# Patient Record
Sex: MALE | Race: WHITE | NOT HISPANIC OR LATINO | Employment: FULL TIME | URBAN - METROPOLITAN AREA
[De-identification: names, ages, dates, MRNs, and addresses within clinical notes are randomized per-mention and may not be internally consistent; named-entity substitution may affect disease eponyms.]

---

## 2021-03-10 ENCOUNTER — OFFICE VISIT (OUTPATIENT)
Dept: FAMILY MEDICINE | Facility: CLINIC | Age: 61
End: 2021-03-10
Payer: COMMERCIAL

## 2021-03-10 VITALS
OXYGEN SATURATION: 95 % | HEIGHT: 70 IN | WEIGHT: 178.56 LBS | HEART RATE: 74 BPM | SYSTOLIC BLOOD PRESSURE: 164 MMHG | BODY MASS INDEX: 25.56 KG/M2 | TEMPERATURE: 98 F | DIASTOLIC BLOOD PRESSURE: 90 MMHG

## 2021-03-10 DIAGNOSIS — I10 ESSENTIAL HYPERTENSION: ICD-10-CM

## 2021-03-10 DIAGNOSIS — F41.9 ANXIETY: ICD-10-CM

## 2021-03-10 DIAGNOSIS — Z12.5 PROSTATE CANCER SCREENING: ICD-10-CM

## 2021-03-10 DIAGNOSIS — Z00.00 ANNUAL PHYSICAL EXAM: Primary | ICD-10-CM

## 2021-03-10 DIAGNOSIS — R47.9 SPEECH DISTURBANCE, UNSPECIFIED TYPE: ICD-10-CM

## 2021-03-10 PROCEDURE — 3077F SYST BP >= 140 MM HG: CPT | Mod: CPTII,S$GLB,, | Performed by: INTERNAL MEDICINE

## 2021-03-10 PROCEDURE — 1126F AMNT PAIN NOTED NONE PRSNT: CPT | Mod: S$GLB,,, | Performed by: INTERNAL MEDICINE

## 2021-03-10 PROCEDURE — 3077F PR MOST RECENT SYSTOLIC BLOOD PRESSURE >= 140 MM HG: ICD-10-PCS | Mod: CPTII,S$GLB,, | Performed by: INTERNAL MEDICINE

## 2021-03-10 PROCEDURE — 99999 PR PBB SHADOW E&M-NEW PATIENT-LVL III: ICD-10-PCS | Mod: PBBFAC,,, | Performed by: INTERNAL MEDICINE

## 2021-03-10 PROCEDURE — 1126F PR PAIN SEVERITY QUANTIFIED, NO PAIN PRESENT: ICD-10-PCS | Mod: S$GLB,,, | Performed by: INTERNAL MEDICINE

## 2021-03-10 PROCEDURE — 3080F PR MOST RECENT DIASTOLIC BLOOD PRESSURE >= 90 MM HG: ICD-10-PCS | Mod: CPTII,S$GLB,, | Performed by: INTERNAL MEDICINE

## 2021-03-10 PROCEDURE — 3080F DIAST BP >= 90 MM HG: CPT | Mod: CPTII,S$GLB,, | Performed by: INTERNAL MEDICINE

## 2021-03-10 PROCEDURE — 3008F PR BODY MASS INDEX (BMI) DOCUMENTED: ICD-10-PCS | Mod: CPTII,S$GLB,, | Performed by: INTERNAL MEDICINE

## 2021-03-10 PROCEDURE — 3008F BODY MASS INDEX DOCD: CPT | Mod: CPTII,S$GLB,, | Performed by: INTERNAL MEDICINE

## 2021-03-10 PROCEDURE — 99204 OFFICE O/P NEW MOD 45 MIN: CPT | Mod: S$GLB,,, | Performed by: INTERNAL MEDICINE

## 2021-03-10 PROCEDURE — 99204 PR OFFICE/OUTPT VISIT, NEW, LEVL IV, 45-59 MIN: ICD-10-PCS | Mod: S$GLB,,, | Performed by: INTERNAL MEDICINE

## 2021-03-10 PROCEDURE — 99999 PR PBB SHADOW E&M-NEW PATIENT-LVL III: CPT | Mod: PBBFAC,,, | Performed by: INTERNAL MEDICINE

## 2021-03-10 RX ORDER — ESCITALOPRAM OXALATE 5 MG/1
5 TABLET ORAL DAILY
Qty: 30 TABLET | Refills: 11 | Status: SHIPPED | OUTPATIENT
Start: 2021-03-10 | End: 2021-03-24

## 2021-03-10 RX ORDER — LOSARTAN POTASSIUM 100 MG/1
100 TABLET ORAL DAILY
Qty: 90 TABLET | Refills: 3 | Status: SHIPPED | OUTPATIENT
Start: 2021-03-10 | End: 2021-03-24

## 2021-03-10 RX ORDER — LOSARTAN POTASSIUM 100 MG/1
100 TABLET ORAL DAILY
COMMUNITY
Start: 2021-01-31 | End: 2021-03-10 | Stop reason: SDUPTHER

## 2021-03-12 ENCOUNTER — TELEPHONE (OUTPATIENT)
Dept: FAMILY MEDICINE | Facility: CLINIC | Age: 61
End: 2021-03-12

## 2021-03-16 ENCOUNTER — TELEPHONE (OUTPATIENT)
Dept: FAMILY MEDICINE | Facility: CLINIC | Age: 61
End: 2021-03-16

## 2021-03-16 LAB
25(OH)D3 SERPL-MCNC: 15 NG/ML (ref 30–100)
ALBUMIN SERPL-MCNC: 4.5 G/DL (ref 3.6–5.1)
ALBUMIN/GLOB SERPL: 2.4 (CALC) (ref 1–2.5)
ALP SERPL-CCNC: 56 U/L (ref 35–144)
ALT SERPL-CCNC: 15 U/L (ref 9–46)
AST SERPL-CCNC: 14 U/L (ref 10–35)
BASOPHILS # BLD AUTO: 31 CELLS/UL (ref 0–200)
BASOPHILS NFR BLD AUTO: 0.6 %
BILIRUB SERPL-MCNC: 0.9 MG/DL (ref 0.2–1.2)
BUN SERPL-MCNC: 14 MG/DL (ref 7–25)
BUN/CREAT SERPL: ABNORMAL (CALC) (ref 6–22)
CALCIUM SERPL-MCNC: 9.1 MG/DL (ref 8.6–10.3)
CHLORIDE SERPL-SCNC: 105 MMOL/L (ref 98–110)
CHOLEST SERPL-MCNC: 152 MG/DL
CHOLEST/HDLC SERPL: 2.6 (CALC)
CO2 SERPL-SCNC: 27 MMOL/L (ref 20–32)
CREAT SERPL-MCNC: 0.93 MG/DL (ref 0.7–1.25)
EOSINOPHIL # BLD AUTO: 0 CELLS/UL (ref 15–500)
EOSINOPHIL NFR BLD AUTO: 0 %
ERYTHROCYTE [DISTWIDTH] IN BLOOD BY AUTOMATED COUNT: 12.2 % (ref 11–15)
GFRSERPLBLD MDRD-ARVRAT: 87 ML/MIN/1.73M2
GLOBULIN SER CALC-MCNC: 1.9 G/DL (CALC) (ref 1.9–3.7)
GLUCOSE SERPL-MCNC: 115 MG/DL (ref 65–99)
HCT VFR BLD AUTO: 45 % (ref 38.5–50)
HDLC SERPL-MCNC: 58 MG/DL
HGB BLD-MCNC: 15 G/DL (ref 13.2–17.1)
LDLC SERPL CALC-MCNC: 78 MG/DL (CALC)
LYMPHOCYTES # BLD AUTO: 1430 CELLS/UL (ref 850–3900)
LYMPHOCYTES NFR BLD AUTO: 27.5 %
MCH RBC QN AUTO: 30.9 PG (ref 27–33)
MCHC RBC AUTO-ENTMCNC: 33.3 G/DL (ref 32–36)
MCV RBC AUTO: 92.6 FL (ref 80–100)
MONOCYTES # BLD AUTO: 504 CELLS/UL (ref 200–950)
MONOCYTES NFR BLD AUTO: 9.7 %
NEUTROPHILS # BLD AUTO: 3234 CELLS/UL (ref 1500–7800)
NEUTROPHILS NFR BLD AUTO: 62.2 %
NONHDLC SERPL-MCNC: 94 MG/DL (CALC)
PLATELET # BLD AUTO: 185 THOUSAND/UL (ref 140–400)
PMV BLD REES-ECKER: 10.7 FL (ref 7.5–12.5)
POTASSIUM SERPL-SCNC: 3.9 MMOL/L (ref 3.5–5.3)
PROT SERPL-MCNC: 6.4 G/DL (ref 6.1–8.1)
PSA SERPL-MCNC: 0.5 NG/ML
RBC # BLD AUTO: 4.86 MILLION/UL (ref 4.2–5.8)
SODIUM SERPL-SCNC: 140 MMOL/L (ref 135–146)
TRIGL SERPL-MCNC: 76 MG/DL
TSH SERPL-ACNC: 1.9 MIU/L (ref 0.4–4.5)
WBC # BLD AUTO: 5.2 THOUSAND/UL (ref 3.8–10.8)

## 2021-03-24 ENCOUNTER — LAB VISIT (OUTPATIENT)
Dept: LAB | Facility: HOSPITAL | Age: 61
End: 2021-03-24
Attending: INTERNAL MEDICINE
Payer: COMMERCIAL

## 2021-03-24 ENCOUNTER — OFFICE VISIT (OUTPATIENT)
Dept: FAMILY MEDICINE | Facility: CLINIC | Age: 61
End: 2021-03-24
Payer: COMMERCIAL

## 2021-03-24 VITALS
HEART RATE: 59 BPM | HEIGHT: 70 IN | OXYGEN SATURATION: 98 % | DIASTOLIC BLOOD PRESSURE: 82 MMHG | WEIGHT: 176.56 LBS | BODY MASS INDEX: 25.28 KG/M2 | TEMPERATURE: 98 F | SYSTOLIC BLOOD PRESSURE: 140 MMHG

## 2021-03-24 DIAGNOSIS — F41.9 ANXIETY: Primary | ICD-10-CM

## 2021-03-24 DIAGNOSIS — I10 ESSENTIAL HYPERTENSION: ICD-10-CM

## 2021-03-24 DIAGNOSIS — R73.9 HYPERGLYCEMIA: ICD-10-CM

## 2021-03-24 LAB
ESTIMATED AVG GLUCOSE: 105 MG/DL (ref 68–131)
HBA1C MFR BLD: 5.3 % (ref 4–5.6)

## 2021-03-24 PROCEDURE — 3077F PR MOST RECENT SYSTOLIC BLOOD PRESSURE >= 140 MM HG: ICD-10-PCS | Mod: CPTII,S$GLB,, | Performed by: INTERNAL MEDICINE

## 2021-03-24 PROCEDURE — 36415 COLL VENOUS BLD VENIPUNCTURE: CPT | Performed by: INTERNAL MEDICINE

## 2021-03-24 PROCEDURE — 3079F DIAST BP 80-89 MM HG: CPT | Mod: CPTII,S$GLB,, | Performed by: INTERNAL MEDICINE

## 2021-03-24 PROCEDURE — 99999 PR PBB SHADOW E&M-EST. PATIENT-LVL III: CPT | Mod: PBBFAC,,, | Performed by: INTERNAL MEDICINE

## 2021-03-24 PROCEDURE — 99214 OFFICE O/P EST MOD 30 MIN: CPT | Mod: S$GLB,,, | Performed by: INTERNAL MEDICINE

## 2021-03-24 PROCEDURE — 3077F SYST BP >= 140 MM HG: CPT | Mod: CPTII,S$GLB,, | Performed by: INTERNAL MEDICINE

## 2021-03-24 PROCEDURE — 1126F AMNT PAIN NOTED NONE PRSNT: CPT | Mod: S$GLB,,, | Performed by: INTERNAL MEDICINE

## 2021-03-24 PROCEDURE — 99999 PR PBB SHADOW E&M-EST. PATIENT-LVL III: ICD-10-PCS | Mod: PBBFAC,,, | Performed by: INTERNAL MEDICINE

## 2021-03-24 PROCEDURE — 1126F PR PAIN SEVERITY QUANTIFIED, NO PAIN PRESENT: ICD-10-PCS | Mod: S$GLB,,, | Performed by: INTERNAL MEDICINE

## 2021-03-24 PROCEDURE — 83036 HEMOGLOBIN GLYCOSYLATED A1C: CPT | Performed by: INTERNAL MEDICINE

## 2021-03-24 PROCEDURE — 3008F PR BODY MASS INDEX (BMI) DOCUMENTED: ICD-10-PCS | Mod: CPTII,S$GLB,, | Performed by: INTERNAL MEDICINE

## 2021-03-24 PROCEDURE — 3008F BODY MASS INDEX DOCD: CPT | Mod: CPTII,S$GLB,, | Performed by: INTERNAL MEDICINE

## 2021-03-24 PROCEDURE — 99214 PR OFFICE/OUTPT VISIT, EST, LEVL IV, 30-39 MIN: ICD-10-PCS | Mod: S$GLB,,, | Performed by: INTERNAL MEDICINE

## 2021-03-24 PROCEDURE — 3079F PR MOST RECENT DIASTOLIC BLOOD PRESSURE 80-89 MM HG: ICD-10-PCS | Mod: CPTII,S$GLB,, | Performed by: INTERNAL MEDICINE

## 2021-03-24 RX ORDER — VIT C/E/ZN/COPPR/LUTEIN/ZEAXAN 250MG-90MG
1000 CAPSULE ORAL DAILY
Qty: 30 CAPSULE | Refills: 11 | Status: SHIPPED | OUTPATIENT
Start: 2021-03-24 | End: 2022-11-30

## 2021-03-24 RX ORDER — LOSARTAN POTASSIUM 100 MG/1
100 TABLET ORAL DAILY
Qty: 90 TABLET | Refills: 3 | Status: SHIPPED | OUTPATIENT
Start: 2021-03-24 | End: 2022-04-07

## 2021-03-24 RX ORDER — ESCITALOPRAM OXALATE 10 MG/1
10 TABLET ORAL DAILY
Qty: 30 TABLET | Refills: 11 | Status: SHIPPED | OUTPATIENT
Start: 2021-03-24 | End: 2021-05-05

## 2021-03-26 ENCOUNTER — TELEPHONE (OUTPATIENT)
Dept: FAMILY MEDICINE | Facility: CLINIC | Age: 61
End: 2021-03-26

## 2021-04-28 DIAGNOSIS — Z12.11 COLON CANCER SCREENING: ICD-10-CM

## 2021-05-05 ENCOUNTER — OFFICE VISIT (OUTPATIENT)
Dept: FAMILY MEDICINE | Facility: CLINIC | Age: 61
End: 2021-05-05
Payer: COMMERCIAL

## 2021-05-05 VITALS
SYSTOLIC BLOOD PRESSURE: 140 MMHG | OXYGEN SATURATION: 97 % | BODY MASS INDEX: 25.25 KG/M2 | HEIGHT: 70 IN | HEART RATE: 68 BPM | DIASTOLIC BLOOD PRESSURE: 88 MMHG | WEIGHT: 176.38 LBS | TEMPERATURE: 98 F

## 2021-05-05 DIAGNOSIS — I10 ESSENTIAL HYPERTENSION: ICD-10-CM

## 2021-05-05 DIAGNOSIS — F41.9 ANXIETY: ICD-10-CM

## 2021-05-05 DIAGNOSIS — R73.9 HYPERGLYCEMIA: ICD-10-CM

## 2021-05-05 DIAGNOSIS — R47.9 SPEECH DISTURBANCE, UNSPECIFIED TYPE: ICD-10-CM

## 2021-05-05 DIAGNOSIS — R13.12 OROPHARYNGEAL DYSPHAGIA: Primary | ICD-10-CM

## 2021-05-05 PROCEDURE — 3008F BODY MASS INDEX DOCD: CPT | Mod: CPTII,S$GLB,, | Performed by: INTERNAL MEDICINE

## 2021-05-05 PROCEDURE — 99214 OFFICE O/P EST MOD 30 MIN: CPT | Mod: S$GLB,,, | Performed by: INTERNAL MEDICINE

## 2021-05-05 PROCEDURE — 1126F AMNT PAIN NOTED NONE PRSNT: CPT | Mod: S$GLB,,, | Performed by: INTERNAL MEDICINE

## 2021-05-05 PROCEDURE — 99999 PR PBB SHADOW E&M-EST. PATIENT-LVL IV: CPT | Mod: PBBFAC,,, | Performed by: INTERNAL MEDICINE

## 2021-05-05 PROCEDURE — 99999 PR PBB SHADOW E&M-EST. PATIENT-LVL IV: ICD-10-PCS | Mod: PBBFAC,,, | Performed by: INTERNAL MEDICINE

## 2021-05-05 PROCEDURE — 1126F PR PAIN SEVERITY QUANTIFIED, NO PAIN PRESENT: ICD-10-PCS | Mod: S$GLB,,, | Performed by: INTERNAL MEDICINE

## 2021-05-05 PROCEDURE — 99214 PR OFFICE/OUTPT VISIT, EST, LEVL IV, 30-39 MIN: ICD-10-PCS | Mod: S$GLB,,, | Performed by: INTERNAL MEDICINE

## 2021-05-05 PROCEDURE — 3008F PR BODY MASS INDEX (BMI) DOCUMENTED: ICD-10-PCS | Mod: CPTII,S$GLB,, | Performed by: INTERNAL MEDICINE

## 2021-05-05 RX ORDER — ESCITALOPRAM OXALATE 10 MG/1
5 TABLET ORAL DAILY
Qty: 15 TABLET | Refills: 11 | Status: SHIPPED | OUTPATIENT
Start: 2021-05-05 | End: 2021-05-05

## 2021-05-05 RX ORDER — ESCITALOPRAM OXALATE 10 MG/1
5 TABLET ORAL DAILY
Qty: 15 TABLET | Refills: 11 | Status: SHIPPED | OUTPATIENT
Start: 2021-05-05 | End: 2021-06-24

## 2021-05-12 ENCOUNTER — HOSPITAL ENCOUNTER (OUTPATIENT)
Dept: RADIOLOGY | Facility: HOSPITAL | Age: 61
Discharge: HOME | End: 2021-05-12
Attending: INTERNAL MEDICINE
Payer: COMMERCIAL

## 2021-05-12 ENCOUNTER — LAB VISIT (OUTPATIENT)
Dept: LAB | Facility: HOSPITAL | Age: 61
End: 2021-05-12
Attending: INTERNAL MEDICINE
Payer: COMMERCIAL

## 2021-05-12 DIAGNOSIS — Z12.11 COLON CANCER SCREENING: ICD-10-CM

## 2021-05-12 DIAGNOSIS — R13.12 OROPHARYNGEAL DYSPHAGIA: ICD-10-CM

## 2021-05-12 PROCEDURE — A9698 NON-RAD CONTRAST MATERIALNOC: HCPCS | Performed by: INTERNAL MEDICINE

## 2021-05-12 PROCEDURE — 74220 X-RAY XM ESOPHAGUS 1CNTRST: CPT | Mod: TC

## 2021-05-12 PROCEDURE — 82274 ASSAY TEST FOR BLOOD FECAL: CPT | Performed by: INTERNAL MEDICINE

## 2021-05-12 PROCEDURE — 74220 FL ESOPHAGRAM COMPLETE: ICD-10-PCS | Mod: 26,,, | Performed by: RADIOLOGY

## 2021-05-12 PROCEDURE — 74220 X-RAY XM ESOPHAGUS 1CNTRST: CPT | Mod: 26,,, | Performed by: RADIOLOGY

## 2021-05-12 PROCEDURE — 25500020 PHARM REV CODE 255: Performed by: INTERNAL MEDICINE

## 2021-05-12 RX ADMIN — BARIUM SULFATE 355 ML: 0.6 SUSPENSION ORAL at 10:05

## 2021-05-26 LAB — HEMOCCULT STL QL IA: NEGATIVE

## 2021-05-29 ENCOUNTER — TELEPHONE (OUTPATIENT)
Dept: FAMILY MEDICINE | Facility: CLINIC | Age: 61
End: 2021-05-29

## 2021-05-29 DIAGNOSIS — R13.12 OROPHARYNGEAL DYSPHAGIA: Primary | ICD-10-CM

## 2021-06-23 ENCOUNTER — PATIENT OUTREACH (OUTPATIENT)
Dept: ADMINISTRATIVE | Facility: OTHER | Age: 61
End: 2021-06-23

## 2021-06-24 ENCOUNTER — OFFICE VISIT (OUTPATIENT)
Dept: NEUROLOGY | Facility: CLINIC | Age: 61
End: 2021-06-24
Payer: COMMERCIAL

## 2021-06-24 VITALS
DIASTOLIC BLOOD PRESSURE: 92 MMHG | HEART RATE: 67 BPM | HEIGHT: 70 IN | SYSTOLIC BLOOD PRESSURE: 152 MMHG | WEIGHT: 176.38 LBS | BODY MASS INDEX: 25.25 KG/M2

## 2021-06-24 DIAGNOSIS — R47.9 SPEECH DISTURBANCE, UNSPECIFIED TYPE: ICD-10-CM

## 2021-06-24 DIAGNOSIS — R27.8 WORSENED HANDWRITING: Primary | ICD-10-CM

## 2021-06-24 DIAGNOSIS — F41.9 ANXIETY: ICD-10-CM

## 2021-06-24 PROCEDURE — 99999 PR PBB SHADOW E&M-EST. PATIENT-LVL IV: ICD-10-PCS | Mod: PBBFAC,,, | Performed by: PSYCHIATRY & NEUROLOGY

## 2021-06-24 PROCEDURE — 1126F AMNT PAIN NOTED NONE PRSNT: CPT | Mod: S$GLB,,, | Performed by: PSYCHIATRY & NEUROLOGY

## 2021-06-24 PROCEDURE — 3008F BODY MASS INDEX DOCD: CPT | Mod: CPTII,S$GLB,, | Performed by: PSYCHIATRY & NEUROLOGY

## 2021-06-24 PROCEDURE — 99204 OFFICE O/P NEW MOD 45 MIN: CPT | Mod: S$GLB,,, | Performed by: PSYCHIATRY & NEUROLOGY

## 2021-06-24 PROCEDURE — 1126F PR PAIN SEVERITY QUANTIFIED, NO PAIN PRESENT: ICD-10-PCS | Mod: S$GLB,,, | Performed by: PSYCHIATRY & NEUROLOGY

## 2021-06-24 PROCEDURE — 3008F PR BODY MASS INDEX (BMI) DOCUMENTED: ICD-10-PCS | Mod: CPTII,S$GLB,, | Performed by: PSYCHIATRY & NEUROLOGY

## 2021-06-24 PROCEDURE — 99204 PR OFFICE/OUTPT VISIT, NEW, LEVL IV, 45-59 MIN: ICD-10-PCS | Mod: S$GLB,,, | Performed by: PSYCHIATRY & NEUROLOGY

## 2021-06-24 PROCEDURE — 99999 PR PBB SHADOW E&M-EST. PATIENT-LVL IV: CPT | Mod: PBBFAC,,, | Performed by: PSYCHIATRY & NEUROLOGY

## 2021-07-06 ENCOUNTER — TELEPHONE (OUTPATIENT)
Dept: FAMILY MEDICINE | Facility: CLINIC | Age: 61
End: 2021-07-06

## 2021-08-11 ENCOUNTER — OFFICE VISIT (OUTPATIENT)
Dept: PSYCHIATRY | Facility: CLINIC | Age: 61
End: 2021-08-11
Payer: COMMERCIAL

## 2021-08-11 ENCOUNTER — LAB VISIT (OUTPATIENT)
Dept: LAB | Facility: HOSPITAL | Age: 61
End: 2021-08-11
Payer: COMMERCIAL

## 2021-08-11 VITALS
HEART RATE: 79 BPM | SYSTOLIC BLOOD PRESSURE: 163 MMHG | BODY MASS INDEX: 25.18 KG/M2 | WEIGHT: 175.5 LBS | DIASTOLIC BLOOD PRESSURE: 86 MMHG

## 2021-08-11 DIAGNOSIS — G12.29 PSEUDOBULBAR PALSY: ICD-10-CM

## 2021-08-11 DIAGNOSIS — R41.82 ALTERED MENTAL STATUS, UNSPECIFIED ALTERED MENTAL STATUS TYPE: ICD-10-CM

## 2021-08-11 DIAGNOSIS — F41.9 ANXIETY: ICD-10-CM

## 2021-08-11 DIAGNOSIS — G12.29 PSEUDOBULBAR PALSY: Primary | ICD-10-CM

## 2021-08-11 DIAGNOSIS — R42 DIZZINESS AND GIDDINESS: ICD-10-CM

## 2021-08-11 LAB
FOLATE SERPL-MCNC: 7.7 NG/ML (ref 4–24)
T3 SERPL-MCNC: 136 NG/DL (ref 60–180)
T3FREE SERPL-MCNC: 3 PG/ML (ref 2.3–4.2)
T4 FREE SERPL-MCNC: 1.14 NG/DL (ref 0.71–1.51)
T4 SERPL-MCNC: 10.2 UG/DL (ref 4.5–11.5)
TSH SERPL DL<=0.005 MIU/L-ACNC: 0.91 UIU/ML (ref 0.4–4)
VIT B12 SERPL-MCNC: 503 PG/ML (ref 210–950)

## 2021-08-11 PROCEDURE — 99999 PR PBB SHADOW E&M-EST. PATIENT-LVL II: ICD-10-PCS | Mod: PBBFAC,,, | Performed by: STUDENT IN AN ORGANIZED HEALTH CARE EDUCATION/TRAINING PROGRAM

## 2021-08-11 PROCEDURE — 84481 FREE ASSAY (FT-3): CPT | Performed by: STUDENT IN AN ORGANIZED HEALTH CARE EDUCATION/TRAINING PROGRAM

## 2021-08-11 PROCEDURE — 36415 COLL VENOUS BLD VENIPUNCTURE: CPT | Performed by: STUDENT IN AN ORGANIZED HEALTH CARE EDUCATION/TRAINING PROGRAM

## 2021-08-11 PROCEDURE — 84480 ASSAY TRIIODOTHYRONINE (T3): CPT | Performed by: STUDENT IN AN ORGANIZED HEALTH CARE EDUCATION/TRAINING PROGRAM

## 2021-08-11 PROCEDURE — 99999 PR PBB SHADOW E&M-EST. PATIENT-LVL II: CPT | Mod: PBBFAC,,, | Performed by: STUDENT IN AN ORGANIZED HEALTH CARE EDUCATION/TRAINING PROGRAM

## 2021-08-11 PROCEDURE — 84436 ASSAY OF TOTAL THYROXINE: CPT | Performed by: STUDENT IN AN ORGANIZED HEALTH CARE EDUCATION/TRAINING PROGRAM

## 2021-08-11 PROCEDURE — 99204 OFFICE O/P NEW MOD 45 MIN: CPT | Mod: S$GLB,,, | Performed by: STUDENT IN AN ORGANIZED HEALTH CARE EDUCATION/TRAINING PROGRAM

## 2021-08-11 PROCEDURE — 84443 ASSAY THYROID STIM HORMONE: CPT | Performed by: STUDENT IN AN ORGANIZED HEALTH CARE EDUCATION/TRAINING PROGRAM

## 2021-08-11 PROCEDURE — 82607 VITAMIN B-12: CPT | Performed by: STUDENT IN AN ORGANIZED HEALTH CARE EDUCATION/TRAINING PROGRAM

## 2021-08-11 PROCEDURE — 84439 ASSAY OF FREE THYROXINE: CPT | Performed by: STUDENT IN AN ORGANIZED HEALTH CARE EDUCATION/TRAINING PROGRAM

## 2021-08-11 PROCEDURE — 99204 PR OFFICE/OUTPT VISIT, NEW, LEVL IV, 45-59 MIN: ICD-10-PCS | Mod: S$GLB,,, | Performed by: STUDENT IN AN ORGANIZED HEALTH CARE EDUCATION/TRAINING PROGRAM

## 2021-08-11 PROCEDURE — 82746 ASSAY OF FOLIC ACID SERUM: CPT | Performed by: STUDENT IN AN ORGANIZED HEALTH CARE EDUCATION/TRAINING PROGRAM

## 2021-08-16 PROBLEM — G12.29 PSEUDOBULBAR PALSY: Status: ACTIVE | Noted: 2021-08-16

## 2021-08-25 ENCOUNTER — HOSPITAL ENCOUNTER (OUTPATIENT)
Dept: RADIOLOGY | Facility: HOSPITAL | Age: 61
Discharge: HOME | End: 2021-08-25
Attending: STUDENT IN AN ORGANIZED HEALTH CARE EDUCATION/TRAINING PROGRAM
Payer: COMMERCIAL

## 2021-08-25 DIAGNOSIS — R42 DIZZINESS AND GIDDINESS: ICD-10-CM

## 2021-08-25 DIAGNOSIS — R41.82 ALTERED MENTAL STATUS, UNSPECIFIED ALTERED MENTAL STATUS TYPE: ICD-10-CM

## 2021-08-25 PROCEDURE — 70450 CT HEAD WITHOUT CONTRAST: ICD-10-PCS | Mod: 26,,, | Performed by: RADIOLOGY

## 2021-08-25 PROCEDURE — 70450 CT HEAD/BRAIN W/O DYE: CPT | Mod: TC

## 2021-08-25 PROCEDURE — 70450 CT HEAD/BRAIN W/O DYE: CPT | Mod: 26,,, | Performed by: RADIOLOGY

## 2021-09-28 ENCOUNTER — TELEPHONE (OUTPATIENT)
Dept: PSYCHIATRY | Facility: CLINIC | Age: 61
End: 2021-09-28

## 2021-12-08 ENCOUNTER — OFFICE VISIT (OUTPATIENT)
Dept: FAMILY MEDICINE | Facility: CLINIC | Age: 61
End: 2021-12-08
Payer: COMMERCIAL

## 2021-12-08 VITALS
BODY MASS INDEX: 24.4 KG/M2 | DIASTOLIC BLOOD PRESSURE: 84 MMHG | OXYGEN SATURATION: 97 % | HEIGHT: 70 IN | HEART RATE: 63 BPM | WEIGHT: 170.44 LBS | SYSTOLIC BLOOD PRESSURE: 108 MMHG

## 2021-12-08 DIAGNOSIS — R68.89 DIFFICULTY WRITING: ICD-10-CM

## 2021-12-08 DIAGNOSIS — Z78.9 HEALTH MAINTENANCE ALTERATION: ICD-10-CM

## 2021-12-08 DIAGNOSIS — F41.9 ANXIETY: ICD-10-CM

## 2021-12-08 DIAGNOSIS — R47.81 SLURRED SPEECH: ICD-10-CM

## 2021-12-08 DIAGNOSIS — R26.81 GAIT INSTABILITY: ICD-10-CM

## 2021-12-08 DIAGNOSIS — I10 ESSENTIAL HYPERTENSION: Primary | ICD-10-CM

## 2021-12-08 DIAGNOSIS — R13.12 OROPHARYNGEAL DYSPHAGIA: ICD-10-CM

## 2021-12-08 PROCEDURE — 1159F MED LIST DOCD IN RCRD: CPT | Mod: CPTII,S$GLB,, | Performed by: INTERNAL MEDICINE

## 2021-12-08 PROCEDURE — 1160F PR REVIEW ALL MEDS BY PRESCRIBER/CLIN PHARMACIST DOCUMENTED: ICD-10-PCS | Mod: CPTII,S$GLB,, | Performed by: INTERNAL MEDICINE

## 2021-12-08 PROCEDURE — 99999 PR PBB SHADOW E&M-EST. PATIENT-LVL V: ICD-10-PCS | Mod: PBBFAC,,, | Performed by: INTERNAL MEDICINE

## 2021-12-08 PROCEDURE — 3079F PR MOST RECENT DIASTOLIC BLOOD PRESSURE 80-89 MM HG: ICD-10-PCS | Mod: CPTII,S$GLB,, | Performed by: INTERNAL MEDICINE

## 2021-12-08 PROCEDURE — 3008F BODY MASS INDEX DOCD: CPT | Mod: CPTII,S$GLB,, | Performed by: INTERNAL MEDICINE

## 2021-12-08 PROCEDURE — 3074F PR MOST RECENT SYSTOLIC BLOOD PRESSURE < 130 MM HG: ICD-10-PCS | Mod: CPTII,S$GLB,, | Performed by: INTERNAL MEDICINE

## 2021-12-08 PROCEDURE — 99214 PR OFFICE/OUTPT VISIT, EST, LEVL IV, 30-39 MIN: ICD-10-PCS | Mod: S$GLB,,, | Performed by: INTERNAL MEDICINE

## 2021-12-08 PROCEDURE — 99214 OFFICE O/P EST MOD 30 MIN: CPT | Mod: S$GLB,,, | Performed by: INTERNAL MEDICINE

## 2021-12-08 PROCEDURE — 1159F PR MEDICATION LIST DOCUMENTED IN MEDICAL RECORD: ICD-10-PCS | Mod: CPTII,S$GLB,, | Performed by: INTERNAL MEDICINE

## 2021-12-08 PROCEDURE — 3079F DIAST BP 80-89 MM HG: CPT | Mod: CPTII,S$GLB,, | Performed by: INTERNAL MEDICINE

## 2021-12-08 PROCEDURE — 4010F ACE/ARB THERAPY RXD/TAKEN: CPT | Mod: CPTII,S$GLB,, | Performed by: INTERNAL MEDICINE

## 2021-12-08 PROCEDURE — 3074F SYST BP LT 130 MM HG: CPT | Mod: CPTII,S$GLB,, | Performed by: INTERNAL MEDICINE

## 2021-12-08 PROCEDURE — 99999 PR PBB SHADOW E&M-EST. PATIENT-LVL V: CPT | Mod: PBBFAC,,, | Performed by: INTERNAL MEDICINE

## 2021-12-08 PROCEDURE — 3008F PR BODY MASS INDEX (BMI) DOCUMENTED: ICD-10-PCS | Mod: CPTII,S$GLB,, | Performed by: INTERNAL MEDICINE

## 2021-12-08 PROCEDURE — 1160F RVW MEDS BY RX/DR IN RCRD: CPT | Mod: CPTII,S$GLB,, | Performed by: INTERNAL MEDICINE

## 2021-12-08 PROCEDURE — 4010F PR ACE/ARB THEARPY RXD/TAKEN: ICD-10-PCS | Mod: CPTII,S$GLB,, | Performed by: INTERNAL MEDICINE
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
## 2021-12-13 ENCOUNTER — TELEPHONE (OUTPATIENT)
Dept: FAMILY MEDICINE | Facility: CLINIC | Age: 61
End: 2021-12-13
Payer: COMMERCIAL

## 2021-12-13 ENCOUNTER — HOSPITAL ENCOUNTER (OUTPATIENT)
Dept: RADIOLOGY | Facility: HOSPITAL | Age: 61
Discharge: HOME | End: 2021-12-13
Attending: INTERNAL MEDICINE
Payer: COMMERCIAL

## 2021-12-13 DIAGNOSIS — R47.81 SLURRED SPEECH: ICD-10-CM

## 2021-12-13 DIAGNOSIS — R13.12 OROPHARYNGEAL DYSPHAGIA: ICD-10-CM

## 2021-12-13 DIAGNOSIS — R68.89 DIFFICULTY WRITING: ICD-10-CM

## 2021-12-13 DIAGNOSIS — R26.81 GAIT INSTABILITY: ICD-10-CM

## 2021-12-13 PROCEDURE — 70551 MRI BRAIN STEM W/O DYE: CPT | Mod: TC,PO

## 2021-12-20 PROBLEM — Z78.9 IMPAIRED INSTRUMENTAL ACTIVITIES OF DAILY LIVING: Status: ACTIVE | Noted: 2021-12-20

## 2021-12-29 ENCOUNTER — OFFICE VISIT (OUTPATIENT)
Dept: PSYCHIATRY | Facility: CLINIC | Age: 61
End: 2021-12-29
Payer: COMMERCIAL

## 2021-12-29 VITALS
BODY MASS INDEX: 24.66 KG/M2 | WEIGHT: 171.88 LBS | HEART RATE: 63 BPM | DIASTOLIC BLOOD PRESSURE: 85 MMHG | SYSTOLIC BLOOD PRESSURE: 167 MMHG

## 2021-12-29 DIAGNOSIS — Z78.9 IMPAIRED INSTRUMENTAL ACTIVITIES OF DAILY LIVING: ICD-10-CM

## 2021-12-29 DIAGNOSIS — R41.82 ALTERED MENTAL STATUS, UNSPECIFIED ALTERED MENTAL STATUS TYPE: ICD-10-CM

## 2021-12-29 DIAGNOSIS — F41.9 ANXIETY DISORDER, UNSPECIFIED TYPE: Primary | ICD-10-CM

## 2021-12-29 PROCEDURE — 99999 PR PBB SHADOW E&M-EST. PATIENT-LVL II: ICD-10-PCS | Mod: PBBFAC,,, | Performed by: STUDENT IN AN ORGANIZED HEALTH CARE EDUCATION/TRAINING PROGRAM

## 2021-12-29 PROCEDURE — 99213 PR OFFICE/OUTPT VISIT, EST, LEVL III, 20-29 MIN: ICD-10-PCS | Mod: S$GLB,,, | Performed by: STUDENT IN AN ORGANIZED HEALTH CARE EDUCATION/TRAINING PROGRAM

## 2021-12-29 PROCEDURE — 99999 PR PBB SHADOW E&M-EST. PATIENT-LVL II: CPT | Mod: PBBFAC,,, | Performed by: STUDENT IN AN ORGANIZED HEALTH CARE EDUCATION/TRAINING PROGRAM

## 2021-12-29 PROCEDURE — 99213 OFFICE O/P EST LOW 20 MIN: CPT | Mod: S$GLB,,, | Performed by: STUDENT IN AN ORGANIZED HEALTH CARE EDUCATION/TRAINING PROGRAM

## 2021-12-29 RX ORDER — SERTRALINE HYDROCHLORIDE 100 MG/1
100 TABLET, FILM COATED ORAL DAILY
Qty: 30 TABLET | Refills: 2 | Status: SHIPPED | OUTPATIENT
Start: 2021-12-29 | End: 2022-04-06 | Stop reason: SDUPTHER

## 2022-01-05 ENCOUNTER — TELEPHONE (OUTPATIENT)
Dept: FAMILY MEDICINE | Facility: CLINIC | Age: 62
End: 2022-01-05
Payer: COMMERCIAL

## 2022-01-05 DIAGNOSIS — R26.81 UNSTEADY GAIT: ICD-10-CM

## 2022-01-05 DIAGNOSIS — R47.81 SLURRED SPEECH: Primary | ICD-10-CM

## 2022-01-05 DIAGNOSIS — R13.12 OROPHARYNGEAL DYSPHAGIA: ICD-10-CM

## 2022-01-26 ENCOUNTER — CLINICAL SUPPORT (OUTPATIENT)
Dept: SPEECH THERAPY | Facility: HOSPITAL | Age: 62
End: 2022-01-26
Attending: INTERNAL MEDICINE
Payer: COMMERCIAL

## 2022-01-26 ENCOUNTER — HOSPITAL ENCOUNTER (OUTPATIENT)
Dept: RADIOLOGY | Facility: HOSPITAL | Age: 62
Discharge: HOME | End: 2022-01-26
Attending: INTERNAL MEDICINE
Payer: COMMERCIAL

## 2022-01-26 DIAGNOSIS — R47.81 SLURRED SPEECH: ICD-10-CM

## 2022-01-26 DIAGNOSIS — R13.12 OROPHARYNGEAL DYSPHAGIA: ICD-10-CM

## 2022-01-26 PROBLEM — R26.81 UNSTEADY GAIT: Status: ACTIVE | Noted: 2022-01-26

## 2022-01-26 PROCEDURE — A9698 NON-RAD CONTRAST MATERIALNOC: HCPCS | Performed by: INTERNAL MEDICINE

## 2022-01-26 PROCEDURE — 92611 MOTION FLUOROSCOPY/SWALLOW: CPT

## 2022-01-26 PROCEDURE — 74230 X-RAY XM SWLNG FUNCJ C+: CPT | Mod: 26,,, | Performed by: RADIOLOGY

## 2022-01-26 PROCEDURE — 25500020 PHARM REV CODE 255: Performed by: INTERNAL MEDICINE

## 2022-01-26 PROCEDURE — 74230 X-RAY XM SWLNG FUNCJ C+: CPT | Mod: TC

## 2022-01-26 PROCEDURE — 74230 FL MODIFIED BARIUM SWALLOW SPEECH STUDY: ICD-10-PCS | Mod: 26,,, | Performed by: RADIOLOGY

## 2022-01-26 RX ADMIN — BARIUM SULFATE 70 ML: 0.81 POWDER, FOR SUSPENSION ORAL at 10:01
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
## 2022-01-31 PROBLEM — Z78.9 IMPAIRED INSTRUMENTAL ACTIVITIES OF DAILY LIVING: Status: RESOLVED | Noted: 2021-12-20 | Resolved: 2022-01-31

## 2022-02-02 ENCOUNTER — PATIENT MESSAGE (OUTPATIENT)
Dept: FAMILY MEDICINE | Facility: CLINIC | Age: 62
End: 2022-02-02
Payer: COMMERCIAL

## 2022-02-18 ENCOUNTER — PATIENT MESSAGE (OUTPATIENT)
Dept: FAMILY MEDICINE | Facility: CLINIC | Age: 62
End: 2022-02-18
Payer: COMMERCIAL

## 2022-03-22 ENCOUNTER — PATIENT OUTREACH (OUTPATIENT)
Dept: ADMINISTRATIVE | Facility: OTHER | Age: 62
End: 2022-03-22
Payer: COMMERCIAL

## 2022-03-23 NOTE — PROGRESS NOTES
Health Maintenance Due   Topic Date Due    Hepatitis C Screening  Never done    HIV Screening  Never done    TETANUS VACCINE  Never done    Shingles Vaccine (1 of 2) Never done    Influenza Vaccine (1) Never done    COVID-19 Vaccine (3 - Booster for Moderna series) 12/28/2021     Updates were requested from care everywhere.  Chart was reviewed for overdue Proactive [facility] Encounters ([facility]) topics (CRS, Breast Cancer Screening, Eye exam)  Health Maintenance has been updated.  LINKS immunization registry triggered.  Immunizations were reconciled.

## 2022-03-24 ENCOUNTER — OFFICE VISIT (OUTPATIENT)
Dept: NEUROLOGY | Facility: CLINIC | Age: 62
End: 2022-03-24
Payer: COMMERCIAL

## 2022-03-24 VITALS
HEART RATE: 74 BPM | BODY MASS INDEX: 24.48 KG/M2 | HEIGHT: 70 IN | DIASTOLIC BLOOD PRESSURE: 87 MMHG | WEIGHT: 171 LBS | SYSTOLIC BLOOD PRESSURE: 150 MMHG

## 2022-03-24 DIAGNOSIS — R29.898 RIGIDITY: ICD-10-CM

## 2022-03-24 DIAGNOSIS — R26.81 GAIT INSTABILITY: ICD-10-CM

## 2022-03-24 DIAGNOSIS — R47.89 RAPID RATE OF SPEECH: Primary | ICD-10-CM

## 2022-03-24 DIAGNOSIS — R13.12 OROPHARYNGEAL DYSPHAGIA: ICD-10-CM

## 2022-03-24 PROCEDURE — 99999 PR PBB SHADOW E&M-EST. PATIENT-LVL IV: CPT | Mod: PBBFAC,,, | Performed by: PSYCHIATRY & NEUROLOGY

## 2022-03-24 PROCEDURE — 3079F DIAST BP 80-89 MM HG: CPT | Mod: CPTII,S$GLB,, | Performed by: PSYCHIATRY & NEUROLOGY

## 2022-03-24 PROCEDURE — 4010F PR ACE/ARB THEARPY RXD/TAKEN: ICD-10-PCS | Mod: CPTII,S$GLB,, | Performed by: PSYCHIATRY & NEUROLOGY

## 2022-03-24 PROCEDURE — 99214 PR OFFICE/OUTPT VISIT, EST, LEVL IV, 30-39 MIN: ICD-10-PCS | Mod: S$GLB,,, | Performed by: PSYCHIATRY & NEUROLOGY

## 2022-03-24 PROCEDURE — 3077F PR MOST RECENT SYSTOLIC BLOOD PRESSURE >= 140 MM HG: ICD-10-PCS | Mod: CPTII,S$GLB,, | Performed by: PSYCHIATRY & NEUROLOGY

## 2022-03-24 PROCEDURE — 3008F PR BODY MASS INDEX (BMI) DOCUMENTED: ICD-10-PCS | Mod: CPTII,S$GLB,, | Performed by: PSYCHIATRY & NEUROLOGY

## 2022-03-24 PROCEDURE — 1160F RVW MEDS BY RX/DR IN RCRD: CPT | Mod: CPTII,S$GLB,, | Performed by: PSYCHIATRY & NEUROLOGY

## 2022-03-24 PROCEDURE — 3079F PR MOST RECENT DIASTOLIC BLOOD PRESSURE 80-89 MM HG: ICD-10-PCS | Mod: CPTII,S$GLB,, | Performed by: PSYCHIATRY & NEUROLOGY

## 2022-03-24 PROCEDURE — 1159F MED LIST DOCD IN RCRD: CPT | Mod: CPTII,S$GLB,, | Performed by: PSYCHIATRY & NEUROLOGY

## 2022-03-24 PROCEDURE — 3077F SYST BP >= 140 MM HG: CPT | Mod: CPTII,S$GLB,, | Performed by: PSYCHIATRY & NEUROLOGY

## 2022-03-24 PROCEDURE — 1159F PR MEDICATION LIST DOCUMENTED IN MEDICAL RECORD: ICD-10-PCS | Mod: CPTII,S$GLB,, | Performed by: PSYCHIATRY & NEUROLOGY

## 2022-03-24 PROCEDURE — 4010F ACE/ARB THERAPY RXD/TAKEN: CPT | Mod: CPTII,S$GLB,, | Performed by: PSYCHIATRY & NEUROLOGY

## 2022-03-24 PROCEDURE — 3008F BODY MASS INDEX DOCD: CPT | Mod: CPTII,S$GLB,, | Performed by: PSYCHIATRY & NEUROLOGY

## 2022-03-24 PROCEDURE — 1160F PR REVIEW ALL MEDS BY PRESCRIBER/CLIN PHARMACIST DOCUMENTED: ICD-10-PCS | Mod: CPTII,S$GLB,, | Performed by: PSYCHIATRY & NEUROLOGY

## 2022-03-24 PROCEDURE — 99214 OFFICE O/P EST MOD 30 MIN: CPT | Mod: S$GLB,,, | Performed by: PSYCHIATRY & NEUROLOGY

## 2022-03-24 PROCEDURE — 99999 PR PBB SHADOW E&M-EST. PATIENT-LVL IV: ICD-10-PCS | Mod: PBBFAC,,, | Performed by: PSYCHIATRY & NEUROLOGY
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
## 2022-03-25 ENCOUNTER — TELEPHONE (OUTPATIENT)
Dept: NEUROLOGY | Facility: CLINIC | Age: 62
End: 2022-03-25
Payer: COMMERCIAL

## 2022-03-25 NOTE — TELEPHONE ENCOUNTER
----- Message from Amirah Smith PA-C sent at 3/25/2022  2:56 PM CDT -----  I can see him.   ----- Message -----  From: Morelia Price MA  Sent: 3/25/2022   2:48 PM CDT  To: Amirah Smith PA-C      ----- Message -----  From: Polo Slater MA  Sent: 3/25/2022   8:16 AM CDT  To: JADYN Springer Dr has the information In the referral     Thanks  ----- Message -----  From: Morelia Price MA  Sent: 3/24/2022   4:44 PM CDT  To: Polo Slater MA    What is the patient being seen for?  ----- Message -----  From: Polo Slater MA  Sent: 3/24/2022  12:26 PM CDT  To: Luis Adnino , can you schedule patient appointment ? Wednesday afternoon  is the best time for pt because he's off from work.    Thanks in advance

## 2022-03-30 ENCOUNTER — OFFICE VISIT (OUTPATIENT)
Dept: FAMILY MEDICINE | Facility: CLINIC | Age: 62
End: 2022-03-30
Payer: COMMERCIAL

## 2022-03-30 VITALS
SYSTOLIC BLOOD PRESSURE: 146 MMHG | OXYGEN SATURATION: 96 % | DIASTOLIC BLOOD PRESSURE: 82 MMHG | BODY MASS INDEX: 23.83 KG/M2 | HEIGHT: 70 IN | HEART RATE: 71 BPM | WEIGHT: 166.44 LBS

## 2022-03-30 DIAGNOSIS — F41.9 ANXIETY: ICD-10-CM

## 2022-03-30 DIAGNOSIS — Z12.5 PROSTATE CANCER SCREENING: ICD-10-CM

## 2022-03-30 DIAGNOSIS — I10 ESSENTIAL HYPERTENSION: Primary | ICD-10-CM

## 2022-03-30 DIAGNOSIS — R47.9 SPEECH DISTURBANCE, UNSPECIFIED TYPE: ICD-10-CM

## 2022-03-30 DIAGNOSIS — R26.81 GAIT INSTABILITY: ICD-10-CM

## 2022-03-30 PROCEDURE — 3077F PR MOST RECENT SYSTOLIC BLOOD PRESSURE >= 140 MM HG: ICD-10-PCS | Mod: CPTII,S$GLB,, | Performed by: INTERNAL MEDICINE

## 2022-03-30 PROCEDURE — 3077F SYST BP >= 140 MM HG: CPT | Mod: CPTII,S$GLB,, | Performed by: INTERNAL MEDICINE

## 2022-03-30 PROCEDURE — 99999 PR PBB SHADOW E&M-EST. PATIENT-LVL IV: CPT | Mod: PBBFAC,,, | Performed by: INTERNAL MEDICINE

## 2022-03-30 PROCEDURE — 1160F RVW MEDS BY RX/DR IN RCRD: CPT | Mod: CPTII,S$GLB,, | Performed by: INTERNAL MEDICINE

## 2022-03-30 PROCEDURE — 3008F PR BODY MASS INDEX (BMI) DOCUMENTED: ICD-10-PCS | Mod: CPTII,S$GLB,, | Performed by: INTERNAL MEDICINE

## 2022-03-30 PROCEDURE — 3079F DIAST BP 80-89 MM HG: CPT | Mod: CPTII,S$GLB,, | Performed by: INTERNAL MEDICINE

## 2022-03-30 PROCEDURE — 99214 PR OFFICE/OUTPT VISIT, EST, LEVL IV, 30-39 MIN: ICD-10-PCS | Mod: S$GLB,,, | Performed by: INTERNAL MEDICINE

## 2022-03-30 PROCEDURE — 1160F PR REVIEW ALL MEDS BY PRESCRIBER/CLIN PHARMACIST DOCUMENTED: ICD-10-PCS | Mod: CPTII,S$GLB,, | Performed by: INTERNAL MEDICINE

## 2022-03-30 PROCEDURE — 1159F MED LIST DOCD IN RCRD: CPT | Mod: CPTII,S$GLB,, | Performed by: INTERNAL MEDICINE

## 2022-03-30 PROCEDURE — 3079F PR MOST RECENT DIASTOLIC BLOOD PRESSURE 80-89 MM HG: ICD-10-PCS | Mod: CPTII,S$GLB,, | Performed by: INTERNAL MEDICINE

## 2022-03-30 PROCEDURE — 4010F PR ACE/ARB THEARPY RXD/TAKEN: ICD-10-PCS | Mod: CPTII,S$GLB,, | Performed by: INTERNAL MEDICINE

## 2022-03-30 PROCEDURE — 3008F BODY MASS INDEX DOCD: CPT | Mod: CPTII,S$GLB,, | Performed by: INTERNAL MEDICINE

## 2022-03-30 PROCEDURE — 99214 OFFICE O/P EST MOD 30 MIN: CPT | Mod: S$GLB,,, | Performed by: INTERNAL MEDICINE

## 2022-03-30 PROCEDURE — 99999 PR PBB SHADOW E&M-EST. PATIENT-LVL IV: ICD-10-PCS | Mod: PBBFAC,,, | Performed by: INTERNAL MEDICINE

## 2022-03-30 PROCEDURE — 4010F ACE/ARB THERAPY RXD/TAKEN: CPT | Mod: CPTII,S$GLB,, | Performed by: INTERNAL MEDICINE

## 2022-03-30 PROCEDURE — 1159F PR MEDICATION LIST DOCUMENTED IN MEDICAL RECORD: ICD-10-PCS | Mod: CPTII,S$GLB,, | Performed by: INTERNAL MEDICINE

## 2022-03-30 NOTE — PROGRESS NOTES
Subjective:       Patient ID: Claude E. Escude is a 62 y.o. male.    Chief Complaint: Hypertension (3 month )      HPI  Claude E. Escude is a 62 y.o. male with chronic conditions of HTN who presents today for follow-up.    Plan has been presenting changes in handwriting, pressures speech, mild trouble swallowing, has been noticing chuffling and occasionally tripping with his left foot falling a few days ago hurting his elbow and back, now better.  Has been receiving physical therapy reporting improvement in gait.  Upper extremity usually has no significant issues other than his fast writing and change in hand writing. Has needed to as other people at work to right for him. Occupational therapy did not show significant benefits and his writing. Not as concerned with the swallowing.     His saw psychiatrist and started Zoloft but he does not think has helped much.  His to neurologist a few days ago and was referred to specialist in movement Disorder has his clinical picture is unclear.    Speech therapy was never arranged.    Health Maintenance:  Health Maintenance   Topic Date Due    Hepatitis C Screening  Never done    TETANUS VACCINE  Never done    Lipid Panel  03/15/2026       Review of Systems   Constitutional: Negative.    HENT: Negative for trouble swallowing.    Eyes: Negative for visual disturbance.   Respiratory: Negative.    Cardiovascular: Negative.    Gastrointestinal: Negative.    Genitourinary: Negative.    Musculoskeletal: Positive for arthralgias (Elbow pain) and back pain.   Neurological: Negative for headaches.        See above   Psychiatric/Behavioral: Negative for sleep disturbance. The patient is nervous/anxious.       Past Medical History:   Diagnosis Date    HTN (hypertension)        No past surgical history on file.    Family History   Problem Relation Age of Onset    Heart disease Mother     Hypertension Mother     Diabetes Mellitus Mother     Arthritis Mother     Prostate cancer  Father 48    No Known Problems Sister     Bone cancer Brother 43    Heart attack Brother 46        smoked    Heart disease Maternal Grandmother     Heart disease Maternal Grandfather     Heart disease Paternal Grandmother     Heart disease Paternal Grandfather        Social History     Socioeconomic History    Marital status:    Tobacco Use    Smoking status: Never Smoker    Smokeless tobacco: Never Used       Current Outpatient Medications   Medication Sig Dispense Refill    losartan (COZAAR) 100 MG tablet Take 1 tablet (100 mg total) by mouth once daily. 90 tablet 3    sertraline (ZOLOFT) 100 MG tablet Take 1 tablet (100 mg total) by mouth once daily. 30 tablet 2    cholecalciferol, vitamin D3, (VITAMIN D3) 25 mcg (1,000 unit) capsule Take 1 capsule (1,000 Units total) by mouth once daily. (Patient not taking: No sig reported) 30 capsule 11     No current facility-administered medications for this visit.       Review of patient's allergies indicates:  No Known Allergies      Objective:       Last 3 sets of Vitals    Vitals - 1 value per visit 3/24/2022 3/30/2022 3/30/2022   SYSTOLIC 150 - 146   DIASTOLIC 87 - 82   Pulse 74 - 71   Temp - - -   SPO2 - - 96   Weight (lb) 171 - 166.45   Weight (kg) 77.565 - 75.5   Height 70 - 70   BMI (Calculated) 24.5 - 23.9   VISIT REPORT - - -   Pain Score  - 0 -   Physical Exam  Constitutional:       General: He is not in acute distress.     Appearance: Normal appearance.   HENT:      Head: Normocephalic.   Eyes:      General: No scleral icterus.     Extraocular Movements: Extraocular movements intact.      Conjunctiva/sclera: Conjunctivae normal.   Neck:      Vascular: No carotid bruit.      Comments: No goiter.  Cardiovascular:      Rate and Rhythm: Normal rate and regular rhythm.      Pulses: Normal pulses.      Heart sounds: Normal heart sounds.   Pulmonary:      Effort: Pulmonary effort is normal.      Breath sounds: Normal breath sounds.   Abdominal:       General: Bowel sounds are normal. There is no distension.      Palpations: Abdomen is soft.   Musculoskeletal:         General: No swelling. Normal range of motion.   Lymphadenopathy:      Cervical: No cervical adenopathy.   Skin:     General: Skin is warm and dry.   Neurological:      General: No focal deficit present.      Mental Status: He is alert and oriented to person, place, and time.      Motor: No weakness.      Gait: Gait normal.   Psychiatric:         Mood and Affect: Mood normal.         Behavior: Behavior normal.               CBC:  Recent Labs   Lab 03/15/21  0713   WBC 5.2   RBC 4.86   Hemoglobin 15.0   Hematocrit 45.0   Platelets 185   MCV 92.6   MCH 30.9   MCHC 33.3     CMP:  Recent Labs   Lab 03/15/21  0713   Glucose 115 H   Calcium 9.1   Albumin 4.5   Total Protein 6.4   Sodium 140   Potassium 3.9   CO2 27   Chloride 105   BUN 14   Creatinine 0.93   ALT 15   AST 14   Total Bilirubin 0.9     URINALYSIS:       LIPIDS:  Recent Labs   Lab 03/15/21  0713 08/11/21  1428   TSH 1.90 0.912   HDL 58  --    Cholesterol 152  --    Triglycerides 76  --    LDL Cholesterol 78  --    HDL/Cholesterol Ratio 2.6  --    Non HDL Chol. (LDL+VLDL) 94  --      TSH:  Recent Labs   Lab 03/15/21  0713 08/11/21  1428   TSH 1.90 0.912       A1C:  Recent Labs   Lab 03/24/21  1523   Hemoglobin A1C 5.3       Imaging:  Fl Modified Barium Swallow Speech  Narrative: EXAMINATION:  FL MODIFIED BARIUM SWALLOW SPEECH STUDY    CLINICAL HISTORY:  Dysphagia, oropharyngeal phase    TECHNIQUE:  Conjunction with speech pathology, the patient ingested barium of varying consistencies to include thin, pudding, barium coated peaches, and cracker.    Fluoroscopy time: 2.8 minutes.    COMPARISON:  None.    FINDINGS:  Mild weakened pharyngeal constriction across tested consistencies with mild pharyngeal residue.  Two instances of purposeful oropharyngeal regurgitation and re-swallow with barium coated cracker.  No convincing laryngeal penetration  or tracheal aspiration.  Some sticking of the barium tablet within the oral cavity on initial swallow attempt.  The pill was swallowed without difficulty on subsequent swallow attempt.  Impression: As above.    Please see separate speech pathology report further detail.    Electronically signed by: Julien Ackerman  Date:    01/26/2022  Time:    11:40      Assessment:       1. Essential hypertension    2. Speech disturbance, unspecified type    3. Gait instability    4. Anxiety    5. Prostate cancer screening          Chronic conditions status updated as per HPI. Other than changes above, cont current medications and maintain follow up with specialist. Return to clinic in 3 months.  Plan:       Claude was seen today for hypertension.    Diagnoses and all orders for this visit:    Essential hypertension  -     Hypertension Digital Medicine ([facility]) Enrollment Order  -     Hypertension Digital Medicine ([facility]): Assign Onboarding Questionnaires  -     CBC Auto Differential; Future  -     Comprehensive Metabolic Panel; Future  -     Hemoglobin A1C; Future  -     Lipid Panel; Future  -     TSH; Future  -     Urinalysis; Future    Speech disturbance, unspecified type  -     Ambulatory referral/consult to Speech Therapy; Future  - follow neurology evaluation  - swallow evaluation with minor changes and patient reports not really having trouble at this time.    Gait instability   - continue physical therapy and follow neurology evaluation.   - other complains like hand writing appears stable.    Anxiety   - does not feel Zoloft made a difference.  Stress related factors possibly associated to complains.   - follow-up with Psychiatry.    Prostate cancer screening  -     PSA, Screening; Future      Health Maintenance Due   Topic Date Due    Hepatitis C Screening  Never done    HIV Screening  Never done    TETANUS VACCINE  Never done    Shingles Vaccine (1 of 2) Never done    Influenza Vaccine (1) Never done    COVID-19  Vaccine (3 - Booster for Moderna series) 12/28/2021        Evangelina Stone MD  [facility]  Disclaimer:  This note has been generated using voice-recognition software. There may be grammatical or spelling errors that have been missed during proof-reading

## 2022-04-01 NOTE — TELEPHONE ENCOUNTER
Care Due:                  Date            Visit Type   Department     Provider  --------------------------------------------------------------------------------                                EP -                              [facility]  Last Visit: 03-      CARE ([facility])   MEDICINE       Evangelina Stone                               -                              [facility]  Next Visit: 05-      CARE ([facility])   MEDICINE       Evangelina Stone                                                            Last  Test          Frequency    Reason                     Performed    Due Date  --------------------------------------------------------------------------------    CMP.........  12 months..  losartan.................  03-   03-    Powered by [employer]. Reference number: [id number].   4/01/2022 2:39:03 PM CDT

## 2022-04-06 ENCOUNTER — TELEPHONE (OUTPATIENT)
Dept: NEUROLOGY | Facility: CLINIC | Age: 62
End: 2022-04-06
Payer: COMMERCIAL

## 2022-04-06 NOTE — TELEPHONE ENCOUNTER
----- Message from Susy Vincent sent at 4/6/2022  2:29 PM CDT -----  Contact: [phone]  Type:  RX Refill Request    Who Called: pt called  Refill or New Rx:refill  RX Name and Strength:sertraline (ZOLOFT) 100 MG tablet,losartan (COZAAR) 100 MG tablet  How is the patient currently taking it? (ex. 1XDay):  Is this a 30 day or 90 day RX:30 days  Preferred Pharmacy with phone number:[facility]/pharmacy #[id number] - [place], [place] - [address]  Local or Mail Order:local  Ordering Provider:Dr. gandara  Would the patient rather a call back or a response via [facility]? Call back  Best Call Back Number:[phone]  Additional Information:

## 2022-04-06 NOTE — TELEPHONE ENCOUNTER
No new care gaps identified.  Powered by [employer]. Reference number: [id number].   4/06/2022 2:41:32 PM CDT

## 2022-04-06 NOTE — TELEPHONE ENCOUNTER
Refill Routing Note   Medication(s) are not appropriate for processing by [facility] for the following reason(s):      - Required laboratory values are outdated  - Required vitals are abnormal    [facility] action(s):  Defer Medication-related problems identified: Requires labs      Labs scheduled  Medication reconciliation completed: No     Appointments  past 12m or future 3m with PCP    Date Provider   Last Visit   3/30/2022 Evangelina Stone MD   Next Visit   5/25/2022 Evangelina Stone MD   ED visits in past 90 days: 0        Note composed:10:50 PM 04/05/2022

## 2022-04-07 RX ORDER — SERTRALINE HYDROCHLORIDE 100 MG/1
100 TABLET, FILM COATED ORAL DAILY
Qty: 30 TABLET | Refills: 2 | Status: SHIPPED | OUTPATIENT
Start: 2022-04-07 | End: 2022-07-04

## 2022-04-07 RX ORDER — LOSARTAN POTASSIUM 100 MG/1
100 TABLET ORAL DAILY
Qty: 90 TABLET | Refills: 3 | Status: SHIPPED | OUTPATIENT
Start: 2022-04-07 | End: 2023-06-22

## 2022-04-07 RX ORDER — LOSARTAN POTASSIUM 100 MG/1
TABLET ORAL
Qty: 90 TABLET | Refills: 1 | Status: SHIPPED | OUTPATIENT
Start: 2022-04-07 | End: 2022-04-07 | Stop reason: SDUPTHER

## 2022-05-02 ENCOUNTER — PATIENT MESSAGE (OUTPATIENT)
Dept: ADMINISTRATIVE | Facility: HOSPITAL | Age: 62
End: 2022-05-02
Payer: COMMERCIAL
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
## 2022-05-11 ENCOUNTER — OFFICE VISIT (OUTPATIENT)
Dept: NEUROLOGY | Facility: CLINIC | Age: 62
End: 2022-05-11
Payer: COMMERCIAL

## 2022-05-11 VITALS
WEIGHT: 167.69 LBS | SYSTOLIC BLOOD PRESSURE: 141 MMHG | HEART RATE: 80 BPM | DIASTOLIC BLOOD PRESSURE: 83 MMHG | BODY MASS INDEX: 24.06 KG/M2

## 2022-05-11 DIAGNOSIS — R47.89 RAPID RATE OF SPEECH: ICD-10-CM

## 2022-05-11 DIAGNOSIS — R13.12 OROPHARYNGEAL DYSPHAGIA: ICD-10-CM

## 2022-05-11 DIAGNOSIS — R29.898 RIGIDITY: ICD-10-CM

## 2022-05-11 DIAGNOSIS — G20.C PARKINSONISM, UNSPECIFIED PARKINSONISM TYPE: Primary | ICD-10-CM

## 2022-05-11 PROCEDURE — 3079F DIAST BP 80-89 MM HG: CPT | Mod: CPTII,S$GLB,, | Performed by: PHYSICIAN ASSISTANT

## 2022-05-11 PROCEDURE — 1160F RVW MEDS BY RX/DR IN RCRD: CPT | Mod: CPTII,S$GLB,, | Performed by: PHYSICIAN ASSISTANT

## 2022-05-11 PROCEDURE — 1159F PR MEDICATION LIST DOCUMENTED IN MEDICAL RECORD: ICD-10-PCS | Mod: CPTII,S$GLB,, | Performed by: PHYSICIAN ASSISTANT

## 2022-05-11 PROCEDURE — 3008F BODY MASS INDEX DOCD: CPT | Mod: CPTII,S$GLB,, | Performed by: PHYSICIAN ASSISTANT

## 2022-05-11 PROCEDURE — 3008F PR BODY MASS INDEX (BMI) DOCUMENTED: ICD-10-PCS | Mod: CPTII,S$GLB,, | Performed by: PHYSICIAN ASSISTANT

## 2022-05-11 PROCEDURE — 4010F PR ACE/ARB THEARPY RXD/TAKEN: ICD-10-PCS | Mod: CPTII,S$GLB,, | Performed by: PHYSICIAN ASSISTANT

## 2022-05-11 PROCEDURE — 3079F PR MOST RECENT DIASTOLIC BLOOD PRESSURE 80-89 MM HG: ICD-10-PCS | Mod: CPTII,S$GLB,, | Performed by: PHYSICIAN ASSISTANT

## 2022-05-11 PROCEDURE — 4010F ACE/ARB THERAPY RXD/TAKEN: CPT | Mod: CPTII,S$GLB,, | Performed by: PHYSICIAN ASSISTANT

## 2022-05-11 PROCEDURE — 99215 OFFICE O/P EST HI 40 MIN: CPT | Mod: S$GLB,,, | Performed by: PHYSICIAN ASSISTANT

## 2022-05-11 PROCEDURE — 99999 PR PBB SHADOW E&M-EST. PATIENT-LVL III: ICD-10-PCS | Mod: PBBFAC,,, | Performed by: PHYSICIAN ASSISTANT

## 2022-05-11 PROCEDURE — 99215 PR OFFICE/OUTPT VISIT, EST, LEVL V, 40-54 MIN: ICD-10-PCS | Mod: S$GLB,,, | Performed by: PHYSICIAN ASSISTANT

## 2022-05-11 PROCEDURE — 1159F MED LIST DOCD IN RCRD: CPT | Mod: CPTII,S$GLB,, | Performed by: PHYSICIAN ASSISTANT

## 2022-05-11 PROCEDURE — 1160F PR REVIEW ALL MEDS BY PRESCRIBER/CLIN PHARMACIST DOCUMENTED: ICD-10-PCS | Mod: CPTII,S$GLB,, | Performed by: PHYSICIAN ASSISTANT

## 2022-05-11 PROCEDURE — 99999 PR PBB SHADOW E&M-EST. PATIENT-LVL III: CPT | Mod: PBBFAC,,, | Performed by: PHYSICIAN ASSISTANT

## 2022-05-11 PROCEDURE — 3077F SYST BP >= 140 MM HG: CPT | Mod: CPTII,S$GLB,, | Performed by: PHYSICIAN ASSISTANT

## 2022-05-11 PROCEDURE — 3077F PR MOST RECENT SYSTOLIC BLOOD PRESSURE >= 140 MM HG: ICD-10-PCS | Mod: CPTII,S$GLB,, | Performed by: PHYSICIAN ASSISTANT

## 2022-05-11 RX ORDER — CARBIDOPA AND LEVODOPA 25; 100 MG/1; MG/1
1 TABLET ORAL 3 TIMES DAILY
Qty: 90 TABLET | Refills: 11 | Status: SHIPPED | OUTPATIENT
Start: 2022-05-11 | End: 2023-04-05 | Stop reason: SDUPTHER

## 2022-05-11 NOTE — PATIENT INSTRUCTIONS
Carbidopa/levodopa 25/100 titration     Week 1:   Take 1/2 tab in AM and 1/2 tab at noon and 1/2 tab in PM     Week 2+: Take 1 tab in AM and 1 tab at noon and 1 tab in PM     If adequately improved, can stop and maintain at any level of the titration. Call me with an update anytime.

## 2022-07-04 RX ORDER — SERTRALINE HYDROCHLORIDE 100 MG/1
100 TABLET, FILM COATED ORAL DAILY
Qty: 90 TABLET | Refills: 2 | Status: SHIPPED | OUTPATIENT
Start: 2022-07-04 | End: 2022-11-30

## 2022-07-04 NOTE — TELEPHONE ENCOUNTER
Care Due:                  Date            Visit Type   Department     Provider  --------------------------------------------------------------------------------                                EP -                              [facility]  Last Visit: 03-      CARE ([facility])   MEDICINE       Evangelina Stone                               -                              [facility]  Next Visit: 07-      CARE ([facility])   MEDICINE       Evangelina Stone                                                            Last  Test          Frequency    Reason                     Performed    Due Date  --------------------------------------------------------------------------------    CMP.........  12 months..  losartan.................  03-   03-    [facility] Embedded Care Gaps. Reference number: [id number]. 7/04/2022   12:08:51 AM CDT

## 2022-07-04 NOTE — TELEPHONE ENCOUNTER
Refill Decision Note   Claude Escude  is requesting a refill authorization.  Brief Assessment and Rationale for Refill:  Approve    -Medication-Related Problems Identified: Requires labs  Medication Therapy Plan:  Labs (CMP)    Medication Reconciliation Completed: No   Comments:     Provider Staff:     Action is required for this patient.   Please see care gap opportunities below in Care Due Message.     Thanks!  [facility]     Appointments      Date Provider   Last Visit   3/30/2022 Evangelina Stone MD   Next Visit   7/20/2022 Evangelina Stone MD     Note composed:4:48 PM 07/04/2022           Note composed:4:48 PM 07/04/2022

## 2022-07-06 ENCOUNTER — PATIENT OUTREACH (OUTPATIENT)
Dept: ADMINISTRATIVE | Facility: HOSPITAL | Age: 62
End: 2022-07-06
Payer: COMMERCIAL

## 2022-07-06 ENCOUNTER — PATIENT MESSAGE (OUTPATIENT)
Dept: ADMINISTRATIVE | Facility: HOSPITAL | Age: 62
End: 2022-07-06
Payer: COMMERCIAL

## 2022-07-06 NOTE — LETTER
July 13, 2022    Claude E. Escude  [address] [place] [address]             [facility]  [address]  [place] [address]  Phone: [phone]    Dear Claude [facility] is committed to your overall health.  To help you get the most out of each of your visits, we will review your information to make sure you are up to date on all of your recommended tests and/or procedures.       Evangelina Stone MD  has found that your chart shows you may be due for :         Health Maintenance Due   Topic    Hepatitis C Screening     HIV Screening     TETANUS VACCINE     Shingles Vaccine (1 of 2)    COVID-19 Vaccine (3 - Booster for Moderna series)    Colorectal Cancer Screening          If you have had any of the above done at another facility, please bring the records or information with you so that your record at [facility] will be complete.  If you would like to schedule any of these test, please call [phone] or send a message via [email or web address].        If you are currently taking medication, please bring it with you to your appointment for review.           Thank you for letting us care for you,        Evangelina Stone MD and your [facility] Team

## 2022-07-06 NOTE — PROGRESS NOTES
Care Everywhere updates requested and reviewed.  Immunizations reconciled. Media reports reviewed.  Duplicate HM overrides and  orders removed.  Overdue HM topic chart audit and/or requested.  Overdue lab testing linked to upcoming lab appointments if applies.        Health Maintenance Due   Topic Date Due    Hepatitis C Screening  Never done    HIV Screening  Never done    TETANUS VACCINE  Never done    Shingles Vaccine (1 of 2) Never done    COVID-19 Vaccine (3 - Booster for Moderna series) 2021    Colorectal Cancer Screening  2022

## 2022-07-13 ENCOUNTER — PATIENT OUTREACH (OUTPATIENT)
Dept: ADMINISTRATIVE | Facility: HOSPITAL | Age: 62
End: 2022-07-13
Payer: COMMERCIAL

## 2022-07-27 DIAGNOSIS — Z12.11 COLON CANCER SCREENING: ICD-10-CM

## 2022-08-01 ENCOUNTER — PATIENT MESSAGE (OUTPATIENT)
Dept: ADMINISTRATIVE | Facility: HOSPITAL | Age: 62
End: 2022-08-01
Payer: COMMERCIAL
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
## 2022-08-10 ENCOUNTER — OFFICE VISIT (OUTPATIENT)
Dept: NEUROLOGY | Facility: CLINIC | Age: 62
End: 2022-08-10
Payer: COMMERCIAL

## 2022-08-10 VITALS
SYSTOLIC BLOOD PRESSURE: 130 MMHG | DIASTOLIC BLOOD PRESSURE: 80 MMHG | HEART RATE: 76 BPM | WEIGHT: 169.44 LBS | HEIGHT: 70 IN | BODY MASS INDEX: 24.26 KG/M2

## 2022-08-10 DIAGNOSIS — G20.C PARKINSONISM, UNSPECIFIED PARKINSONISM TYPE: Primary | ICD-10-CM

## 2022-08-10 DIAGNOSIS — R26.81 GAIT INSTABILITY: ICD-10-CM

## 2022-08-10 DIAGNOSIS — R47.89 RAPID RATE OF SPEECH: ICD-10-CM

## 2022-08-10 DIAGNOSIS — Z71.89 COUNSELING REGARDING GOALS OF CARE: ICD-10-CM

## 2022-08-10 PROCEDURE — 4010F PR ACE/ARB THEARPY RXD/TAKEN: ICD-10-PCS | Mod: CPTII,S$GLB,, | Performed by: PHYSICIAN ASSISTANT

## 2022-08-10 PROCEDURE — 99214 PR OFFICE/OUTPT VISIT, EST, LEVL IV, 30-39 MIN: ICD-10-PCS | Mod: S$GLB,,, | Performed by: PHYSICIAN ASSISTANT

## 2022-08-10 PROCEDURE — 3079F PR MOST RECENT DIASTOLIC BLOOD PRESSURE 80-89 MM HG: ICD-10-PCS | Mod: CPTII,S$GLB,, | Performed by: PHYSICIAN ASSISTANT

## 2022-08-10 PROCEDURE — 3075F PR MOST RECENT SYSTOLIC BLOOD PRESS GE 130-139MM HG: ICD-10-PCS | Mod: CPTII,S$GLB,, | Performed by: PHYSICIAN ASSISTANT

## 2022-08-10 PROCEDURE — 3075F SYST BP GE 130 - 139MM HG: CPT | Mod: CPTII,S$GLB,, | Performed by: PHYSICIAN ASSISTANT

## 2022-08-10 PROCEDURE — 1160F PR REVIEW ALL MEDS BY PRESCRIBER/CLIN PHARMACIST DOCUMENTED: ICD-10-PCS | Mod: CPTII,S$GLB,, | Performed by: PHYSICIAN ASSISTANT

## 2022-08-10 PROCEDURE — 3008F BODY MASS INDEX DOCD: CPT | Mod: CPTII,S$GLB,, | Performed by: PHYSICIAN ASSISTANT

## 2022-08-10 PROCEDURE — 4010F ACE/ARB THERAPY RXD/TAKEN: CPT | Mod: CPTII,S$GLB,, | Performed by: PHYSICIAN ASSISTANT

## 2022-08-10 PROCEDURE — 1159F MED LIST DOCD IN RCRD: CPT | Mod: CPTII,S$GLB,, | Performed by: PHYSICIAN ASSISTANT

## 2022-08-10 PROCEDURE — 1159F PR MEDICATION LIST DOCUMENTED IN MEDICAL RECORD: ICD-10-PCS | Mod: CPTII,S$GLB,, | Performed by: PHYSICIAN ASSISTANT

## 2022-08-10 PROCEDURE — 3079F DIAST BP 80-89 MM HG: CPT | Mod: CPTII,S$GLB,, | Performed by: PHYSICIAN ASSISTANT

## 2022-08-10 PROCEDURE — 99214 OFFICE O/P EST MOD 30 MIN: CPT | Mod: S$GLB,,, | Performed by: PHYSICIAN ASSISTANT

## 2022-08-10 PROCEDURE — 99999 PR PBB SHADOW E&M-EST. PATIENT-LVL IV: CPT | Mod: PBBFAC,,, | Performed by: PHYSICIAN ASSISTANT

## 2022-08-10 PROCEDURE — 1160F RVW MEDS BY RX/DR IN RCRD: CPT | Mod: CPTII,S$GLB,, | Performed by: PHYSICIAN ASSISTANT

## 2022-08-10 PROCEDURE — 3008F PR BODY MASS INDEX (BMI) DOCUMENTED: ICD-10-PCS | Mod: CPTII,S$GLB,, | Performed by: PHYSICIAN ASSISTANT

## 2022-08-10 PROCEDURE — 99999 PR PBB SHADOW E&M-EST. PATIENT-LVL IV: ICD-10-PCS | Mod: PBBFAC,,, | Performed by: PHYSICIAN ASSISTANT

## 2022-08-10 RX ORDER — NAPROXEN SODIUM 220 MG/1
81 TABLET, FILM COATED ORAL DAILY
COMMUNITY
Start: 2022-08-03 | End: 2022-11-30

## 2022-08-10 NOTE — PATIENT INSTRUCTIONS
For one week, take 1/2 tablet of zoloft daily THEN take 1/2 tablet every other day for a week and then stop.   After you have been off of this medication for a week, we will consider the addition of rasagiline (azilect) to help boost your carbidopa/levodopa.

## 2022-08-17 ENCOUNTER — OFFICE VISIT (OUTPATIENT)
Dept: FAMILY MEDICINE | Facility: CLINIC | Age: 62
End: 2022-08-17
Payer: COMMERCIAL

## 2022-08-17 VITALS
OXYGEN SATURATION: 98 % | WEIGHT: 170.19 LBS | BODY MASS INDEX: 24.37 KG/M2 | SYSTOLIC BLOOD PRESSURE: 132 MMHG | HEART RATE: 71 BPM | HEIGHT: 70 IN | DIASTOLIC BLOOD PRESSURE: 84 MMHG

## 2022-08-17 DIAGNOSIS — R29.6 FREQUENT FALLS: ICD-10-CM

## 2022-08-17 DIAGNOSIS — Z12.11 COLON CANCER SCREENING: ICD-10-CM

## 2022-08-17 DIAGNOSIS — Z12.5 PROSTATE CANCER SCREENING: ICD-10-CM

## 2022-08-17 DIAGNOSIS — I10 ESSENTIAL HYPERTENSION: Primary | ICD-10-CM

## 2022-08-17 DIAGNOSIS — G20.A1 PARKINSON'S DISEASE: ICD-10-CM

## 2022-08-17 DIAGNOSIS — Z87.828 HISTORY OF MENISCAL TEAR: ICD-10-CM

## 2022-08-17 DIAGNOSIS — Z00.00 ANNUAL PHYSICAL EXAM: ICD-10-CM

## 2022-08-17 PROCEDURE — 3008F BODY MASS INDEX DOCD: CPT | Mod: CPTII,S$GLB,, | Performed by: INTERNAL MEDICINE

## 2022-08-17 PROCEDURE — 1160F PR REVIEW ALL MEDS BY PRESCRIBER/CLIN PHARMACIST DOCUMENTED: ICD-10-PCS | Mod: CPTII,S$GLB,, | Performed by: INTERNAL MEDICINE

## 2022-08-17 PROCEDURE — 1160F RVW MEDS BY RX/DR IN RCRD: CPT | Mod: CPTII,S$GLB,, | Performed by: INTERNAL MEDICINE

## 2022-08-17 PROCEDURE — 3075F SYST BP GE 130 - 139MM HG: CPT | Mod: CPTII,S$GLB,, | Performed by: INTERNAL MEDICINE

## 2022-08-17 PROCEDURE — 1159F PR MEDICATION LIST DOCUMENTED IN MEDICAL RECORD: ICD-10-PCS | Mod: CPTII,S$GLB,, | Performed by: INTERNAL MEDICINE

## 2022-08-17 PROCEDURE — 90471 IMMUNIZATION ADMIN: CPT | Mod: S$GLB,,, | Performed by: INTERNAL MEDICINE

## 2022-08-17 PROCEDURE — 90715 TDAP VACCINE 7 YRS/> IM: CPT | Mod: S$GLB,,, | Performed by: INTERNAL MEDICINE

## 2022-08-17 PROCEDURE — 99999 PR PBB SHADOW E&M-EST. PATIENT-LVL III: CPT | Mod: PBBFAC,,, | Performed by: INTERNAL MEDICINE

## 2022-08-17 PROCEDURE — 3079F PR MOST RECENT DIASTOLIC BLOOD PRESSURE 80-89 MM HG: ICD-10-PCS | Mod: CPTII,S$GLB,, | Performed by: INTERNAL MEDICINE

## 2022-08-17 PROCEDURE — 99214 OFFICE O/P EST MOD 30 MIN: CPT | Mod: 25,S$GLB,, | Performed by: INTERNAL MEDICINE

## 2022-08-17 PROCEDURE — 99999 PR PBB SHADOW E&M-EST. PATIENT-LVL III: ICD-10-PCS | Mod: PBBFAC,,, | Performed by: INTERNAL MEDICINE

## 2022-08-17 PROCEDURE — 4010F ACE/ARB THERAPY RXD/TAKEN: CPT | Mod: CPTII,S$GLB,, | Performed by: INTERNAL MEDICINE

## 2022-08-17 PROCEDURE — 4010F PR ACE/ARB THEARPY RXD/TAKEN: ICD-10-PCS | Mod: CPTII,S$GLB,, | Performed by: INTERNAL MEDICINE

## 2022-08-17 PROCEDURE — 90471 TDAP VACCINE GREATER THAN OR EQUAL TO 7YO IM: ICD-10-PCS | Mod: S$GLB,,, | Performed by: INTERNAL MEDICINE

## 2022-08-17 PROCEDURE — 3008F PR BODY MASS INDEX (BMI) DOCUMENTED: ICD-10-PCS | Mod: CPTII,S$GLB,, | Performed by: INTERNAL MEDICINE

## 2022-08-17 PROCEDURE — 90715 TDAP VACCINE GREATER THAN OR EQUAL TO 7YO IM: ICD-10-PCS | Mod: S$GLB,,, | Performed by: INTERNAL MEDICINE

## 2022-08-17 PROCEDURE — 3079F DIAST BP 80-89 MM HG: CPT | Mod: CPTII,S$GLB,, | Performed by: INTERNAL MEDICINE

## 2022-08-17 PROCEDURE — 1159F MED LIST DOCD IN RCRD: CPT | Mod: CPTII,S$GLB,, | Performed by: INTERNAL MEDICINE

## 2022-08-17 PROCEDURE — 99214 PR OFFICE/OUTPT VISIT, EST, LEVL IV, 30-39 MIN: ICD-10-PCS | Mod: 25,S$GLB,, | Performed by: INTERNAL MEDICINE

## 2022-08-17 PROCEDURE — 3075F PR MOST RECENT SYSTOLIC BLOOD PRESS GE 130-139MM HG: ICD-10-PCS | Mod: CPTII,S$GLB,, | Performed by: INTERNAL MEDICINE

## 2022-08-17 NOTE — PROGRESS NOTES
Subjective:       Patient ID: Claude E. Escude is a 62 y.o. male.    Chief Complaint: Hypertension (6 week)      HPI  Claude E. Escude is a 62 y.o. male with chronic conditions of hypertension and recently diagnosed with Parkinson's disease who presents today for follow-up.    He was evaluated by neurologist apartment specializing movement disorder and diagnosed with Parkinson's disease.  He was started on Sinemet and started when in Zoloft to later optimize his treatment.  Tolerates treatment but so far has not seen much change.  Denies dizziness or GI symptoms.  Continues to show full, pressures speech but no trouble swallowing. Speech therapy has been ordered and will have a follow-up with Neurology.      He has falling to a 3 times but not related to weakness.  He was playing with his grand kid when landed on his back and hit his shoulder with no head or need injury.    He had left knee arthroscopic surgery and has some pain but has been more active and he is supposed to playing with his grandkids, running bicycle, plain golf.  Pain is better than before the surgery.    Blood pressure has been stable.    Health Maintenance:  Health Maintenance   Topic Date Due    Hepatitis C Screening  Never done    Lipid Panel  03/15/2026    TETANUS VACCINE  08/17/2032       Review of Systems   Constitutional: Negative.  Negative for fever and unexpected weight change.   HENT: Negative.    Eyes: Negative for visual disturbance.   Respiratory: Negative.    Cardiovascular: Negative.    Gastrointestinal: Negative.    Genitourinary: Negative for difficulty urinating.   Musculoskeletal: Positive for arthralgias (Knee and shoulder pain.).   Integumentary:  Negative.   Neurological: Positive for tremors and speech difficulty. Negative for dizziness, weakness and memory loss.   Psychiatric/Behavioral: Negative.       Past Medical History:   Diagnosis Date    HTN (hypertension)        No past surgical history on file.    Family  History   Problem Relation Age of Onset    Heart disease Mother     Hypertension Mother     Diabetes Mellitus Mother     Arthritis Mother     Prostate cancer Father 48    No Known Problems Sister     Bone cancer Brother 43    Heart attack Brother 46        smoked    Heart disease Maternal Grandmother     Heart disease Maternal Grandfather     Heart disease Paternal Grandmother     Heart disease Paternal Grandfather        Social History     Socioeconomic History    Marital status:    Tobacco Use    Smoking status: Never Smoker    Smokeless tobacco: Never Used       Current Outpatient Medications   Medication Sig Dispense Refill    carbidopa-levodopa  mg (SINEMET)  mg per tablet Take 1 tablet by mouth 3 (three) times daily. 90 tablet 11    losartan (COZAAR) 100 MG tablet Take 1 tablet (100 mg total) by mouth once daily. 90 tablet 3    sertraline (ZOLOFT) 100 MG tablet Take 1 tablet (100 mg total) by mouth once daily. 90 tablet 2    aspirin 81 MG Chew Take 81 mg by mouth once daily.      cholecalciferol, vitamin D3, (VITAMIN D3) 25 mcg (1,000 unit) capsule Take 1 capsule (1,000 Units total) by mouth once daily. (Patient not taking: Reported on 8/17/2022) 30 capsule 11     No current facility-administered medications for this visit.       Review of patient's allergies indicates:  No Known Allergies      Objective:       Last 3 sets of Vitals    Vitals - 1 value per visit 8/10/2022 8/17/2022 8/17/2022   SYSTOLIC 130 - 132   DIASTOLIC 80 - 84   Pulse 76 - 71   Temp - - -   SPO2 - - 98   Weight (lb) 169.42 - 170.2   Weight (kg) 76.85 - 77.2   Height 70 - 70   BMI (Calculated) 24.3 - 24.4   VISIT REPORT - - -   Pain Score  - 0 -   Physical Exam  Constitutional:       General: He is not in acute distress.     Appearance: Normal appearance.   HENT:      Head: Normocephalic.   Eyes:      General: No scleral icterus.     Extraocular Movements: Extraocular movements intact.       Conjunctiva/sclera: Conjunctivae normal.   Neck:      Vascular: No carotid bruit.      Comments: No goiter.  Cardiovascular:      Rate and Rhythm: Normal rate and regular rhythm.      Pulses: Normal pulses.      Heart sounds: Normal heart sounds.   Pulmonary:      Effort: Pulmonary effort is normal.      Breath sounds: Normal breath sounds.   Abdominal:      General: Bowel sounds are normal. There is no distension.      Palpations: Abdomen is soft.   Musculoskeletal:         General: Swelling (Mild swelling of the left knee with clean surgical site.  No heat or redness) present. Normal range of motion.   Lymphadenopathy:      Cervical: No cervical adenopathy.   Skin:     General: Skin is warm and dry.   Neurological:      General: No focal deficit present.      Mental Status: He is alert and oriented to person, place, and time.      Motor: No weakness.      Gait: Gait normal.      Comments: No rigidity or of use tremor.  Speech is fast.   Psychiatric:         Mood and Affect: Mood normal.         Behavior: Behavior normal.           CBC:  Recent Labs   Lab 03/15/21  0713   WBC 5.2   RBC 4.86   Hemoglobin 15.0   Hematocrit 45.0   Platelets 185   MCV 92.6   MCH 30.9   MCHC 33.3     CMP:  Recent Labs   Lab 03/15/21  0713   Glucose 115 H   Calcium 9.1   Albumin 4.5   Total Protein 6.4   Sodium 140   Potassium 3.9   CO2 27   Chloride 105   BUN 14   Creatinine 0.93   ALT 15   AST 14   Total Bilirubin 0.9     URINALYSIS:       LIPIDS:  Recent Labs   Lab 03/15/21  0713 08/11/21  1428   TSH 1.90 0.912   HDL 58  --    Cholesterol 152  --    Triglycerides 76  --    LDL Cholesterol 78  --    HDL/Cholesterol Ratio 2.6  --    Non HDL Chol. (LDL+VLDL) 94  --      TSH:  Recent Labs   Lab 03/15/21  0713 08/11/21  1428   TSH 1.90 0.912       A1C:  Recent Labs   Lab 03/24/21  1523   Hemoglobin A1C 5.3       Imaging:  Fl Modified Barium Swallow Speech  Narrative: EXAMINATION:  FL MODIFIED BARIUM SWALLOW SPEECH STUDY    CLINICAL  HISTORY:  Dysphagia, oropharyngeal phase    TECHNIQUE:  Conjunction with speech pathology, the patient ingested barium of varying consistencies to include thin, pudding, barium coated peaches, and cracker.    Fluoroscopy time: 2.8 minutes.    COMPARISON:  None.    FINDINGS:  Mild weakened pharyngeal constriction across tested consistencies with mild pharyngeal residue.  Two instances of purposeful oropharyngeal regurgitation and re-swallow with barium coated cracker.  No convincing laryngeal penetration or tracheal aspiration.  Some sticking of the barium tablet within the oral cavity on initial swallow attempt.  The pill was swallowed without difficulty on subsequent swallow attempt.  Impression: As above.    Please see separate speech pathology report further detail.    Electronically signed by: Julien Ackerman  Date:    01/26/2022  Time:    11:40      Assessment:       1. Essential hypertension    2. Parkinson's disease    3. Frequent falls    4. History of meniscal tear    5. Annual physical exam    6. Prostate cancer screening    7. Colon cancer screening            Plan:       Claude was seen today for hypertension.    Diagnoses and all orders for this visit:    Essential hypertension  -     Lipid Panel  -     Comprehensive Metabolic Panel    Parkinson's disease   - tolerating medications.  Being weaned.    Frequent falls   - has follow-up with Neurology    History of meniscal tear   - no clear injury.  Follow with Ortho.    Annual physical exam  - stable vital signs, healthy weight stable exam.  -     (In Office Administered) Tdap Vaccine  -     Hemoglobin A1C; Future  -     TSH; Future  -     Lipid Panel; Future  -     Comprehensive Metabolic Panel; Future  -     CBC Auto Differential; Future    Colon cancer screening  -     Fecal Immunochemical Test (iFOBT); Future      Health Maintenance Due   Topic Date Due    Hepatitis C Screening  Never done    Shingles Vaccine (1 of 2) Never done    COVID-19  Vaccine (3 - Booster for Moderna series) 12/28/2021    Colorectal Cancer Screening  05/25/2022        Return to clinic in 6 months.    Evangelina Stone MD  [facility]  Disclaimer:  This note has been generated using voice-recognition software. There may be grammatical or spelling errors that have been missed during proof-reading

## 2022-08-22 ENCOUNTER — LAB VISIT (OUTPATIENT)
Dept: LAB | Facility: HOSPITAL | Age: 62
End: 2022-08-22
Attending: INTERNAL MEDICINE
Payer: COMMERCIAL

## 2022-08-22 DIAGNOSIS — Z12.11 COLON CANCER SCREENING: ICD-10-CM

## 2022-08-22 PROCEDURE — 82274 ASSAY TEST FOR BLOOD FECAL: CPT | Performed by: INTERNAL MEDICINE

## 2022-08-25 LAB
ALBUMIN SERPL-MCNC: 4.5 G/DL (ref 3.6–5.1)
ALBUMIN/GLOB SERPL: 2.4 (CALC) (ref 1–2.5)
ALP SERPL-CCNC: 57 U/L (ref 35–144)
ALT SERPL-CCNC: 13 U/L (ref 9–46)
AST SERPL-CCNC: 13 U/L (ref 10–35)
BASOPHILS # BLD AUTO: 31 CELLS/UL (ref 0–200)
BASOPHILS NFR BLD AUTO: 0.7 %
BILIRUB SERPL-MCNC: 0.8 MG/DL (ref 0.2–1.2)
BUN SERPL-MCNC: 16 MG/DL (ref 7–25)
BUN/CREAT SERPL: ABNORMAL (CALC) (ref 6–22)
CALCIUM SERPL-MCNC: 9.3 MG/DL (ref 8.6–10.3)
CHLORIDE SERPL-SCNC: 107 MMOL/L (ref 98–110)
CHOLEST SERPL-MCNC: 159 MG/DL
CHOLEST/HDLC SERPL: 3.1 (CALC)
CO2 SERPL-SCNC: 29 MMOL/L (ref 20–32)
CREAT SERPL-MCNC: 0.85 MG/DL (ref 0.7–1.35)
EGFR: 97 ML/MIN/1.73M2
EOSINOPHIL # BLD AUTO: 0 CELLS/UL (ref 15–500)
EOSINOPHIL NFR BLD AUTO: 0 %
ERYTHROCYTE [DISTWIDTH] IN BLOOD BY AUTOMATED COUNT: 12.3 % (ref 11–15)
GLOBULIN SER CALC-MCNC: 1.9 G/DL (CALC) (ref 1.9–3.7)
GLUCOSE SERPL-MCNC: 104 MG/DL (ref 65–99)
HBA1C MFR BLD: 5.4 % OF TOTAL HGB
HCT VFR BLD AUTO: 42.3 % (ref 38.5–50)
HDLC SERPL-MCNC: 51 MG/DL
HGB BLD-MCNC: 14.1 G/DL (ref 13.2–17.1)
LDLC SERPL CALC-MCNC: 92 MG/DL (CALC)
LYMPHOCYTES # BLD AUTO: 1109 CELLS/UL (ref 850–3900)
LYMPHOCYTES NFR BLD AUTO: 25.2 %
MCH RBC QN AUTO: 31.2 PG (ref 27–33)
MCHC RBC AUTO-ENTMCNC: 33.3 G/DL (ref 32–36)
MCV RBC AUTO: 93.6 FL (ref 80–100)
MONOCYTES # BLD AUTO: 321 CELLS/UL (ref 200–950)
MONOCYTES NFR BLD AUTO: 7.3 %
NEUTROPHILS # BLD AUTO: 2939 CELLS/UL (ref 1500–7800)
NEUTROPHILS NFR BLD AUTO: 66.8 %
NONHDLC SERPL-MCNC: 108 MG/DL (CALC)
PLATELET # BLD AUTO: 193 THOUSAND/UL (ref 140–400)
PMV BLD REES-ECKER: 10.2 FL (ref 7.5–12.5)
POTASSIUM SERPL-SCNC: 4.1 MMOL/L (ref 3.5–5.3)
PROT SERPL-MCNC: 6.4 G/DL (ref 6.1–8.1)
RBC # BLD AUTO: 4.52 MILLION/UL (ref 4.2–5.8)
SODIUM SERPL-SCNC: 143 MMOL/L (ref 135–146)
TRIGL SERPL-MCNC: 69 MG/DL
TSH SERPL-ACNC: 2.4 MIU/L (ref 0.4–4.5)
WBC # BLD AUTO: 4.4 THOUSAND/UL (ref 3.8–10.8)

## 2022-08-26 ENCOUNTER — PATIENT MESSAGE (OUTPATIENT)
Dept: FAMILY MEDICINE | Facility: CLINIC | Age: 62
End: 2022-08-26
Payer: COMMERCIAL

## 2022-08-26 DIAGNOSIS — R89.9 ABNORMAL LABORATORY TEST: Primary | ICD-10-CM

## 2022-08-30 LAB — HEMOCCULT STL QL IA: NEGATIVE

## 2022-09-26 ENCOUNTER — TELEPHONE (OUTPATIENT)
Dept: OTOLARYNGOLOGY | Facility: CLINIC | Age: 62
End: 2022-09-26
Payer: COMMERCIAL

## 2022-10-07 ENCOUNTER — OFFICE VISIT (OUTPATIENT)
Dept: OTOLARYNGOLOGY | Facility: CLINIC | Age: 62
End: 2022-10-07
Payer: COMMERCIAL

## 2022-10-07 VITALS
DIASTOLIC BLOOD PRESSURE: 91 MMHG | HEIGHT: 70 IN | HEART RATE: 64 BPM | WEIGHT: 174.63 LBS | SYSTOLIC BLOOD PRESSURE: 169 MMHG | BODY MASS INDEX: 25 KG/M2

## 2022-10-07 DIAGNOSIS — J06.9 UPPER RESPIRATORY TRACT INFECTION, UNSPECIFIED TYPE: ICD-10-CM

## 2022-10-07 DIAGNOSIS — R49.0 DYSPHONIA: Primary | ICD-10-CM

## 2022-10-07 DIAGNOSIS — G12.29 PSEUDOBULBAR PALSY: ICD-10-CM

## 2022-10-07 DIAGNOSIS — G20.A1 PARKINSON'S DISEASE: ICD-10-CM

## 2022-10-07 PROCEDURE — 3080F DIAST BP >= 90 MM HG: CPT | Mod: CPTII,S$GLB,, | Performed by: OTOLARYNGOLOGY

## 2022-10-07 PROCEDURE — 99999 PR PBB SHADOW E&M-EST. PATIENT-LVL III: ICD-10-PCS | Mod: PBBFAC,,, | Performed by: OTOLARYNGOLOGY

## 2022-10-07 PROCEDURE — 3077F SYST BP >= 140 MM HG: CPT | Mod: CPTII,S$GLB,, | Performed by: OTOLARYNGOLOGY

## 2022-10-07 PROCEDURE — 1159F PR MEDICATION LIST DOCUMENTED IN MEDICAL RECORD: ICD-10-PCS | Mod: CPTII,S$GLB,, | Performed by: OTOLARYNGOLOGY

## 2022-10-07 PROCEDURE — 4010F PR ACE/ARB THEARPY RXD/TAKEN: ICD-10-PCS | Mod: CPTII,S$GLB,, | Performed by: OTOLARYNGOLOGY

## 2022-10-07 PROCEDURE — 3008F BODY MASS INDEX DOCD: CPT | Mod: CPTII,S$GLB,, | Performed by: OTOLARYNGOLOGY

## 2022-10-07 PROCEDURE — 31575 DIAGNOSTIC LARYNGOSCOPY: CPT | Mod: S$GLB,,, | Performed by: OTOLARYNGOLOGY

## 2022-10-07 PROCEDURE — 99203 PR OFFICE/OUTPT VISIT, NEW, LEVL III, 30-44 MIN: ICD-10-PCS | Mod: 25,S$GLB,, | Performed by: OTOLARYNGOLOGY

## 2022-10-07 PROCEDURE — 1160F PR REVIEW ALL MEDS BY PRESCRIBER/CLIN PHARMACIST DOCUMENTED: ICD-10-PCS | Mod: CPTII,S$GLB,, | Performed by: OTOLARYNGOLOGY

## 2022-10-07 PROCEDURE — 99999 PR PBB SHADOW E&M-EST. PATIENT-LVL III: CPT | Mod: PBBFAC,,, | Performed by: OTOLARYNGOLOGY

## 2022-10-07 PROCEDURE — 3044F HG A1C LEVEL LT 7.0%: CPT | Mod: CPTII,S$GLB,, | Performed by: OTOLARYNGOLOGY

## 2022-10-07 PROCEDURE — 31575 PR LARYNGOSCOPY, FLEXIBLE; DIAGNOSTIC: ICD-10-PCS | Mod: S$GLB,,, | Performed by: OTOLARYNGOLOGY

## 2022-10-07 PROCEDURE — 1160F RVW MEDS BY RX/DR IN RCRD: CPT | Mod: CPTII,S$GLB,, | Performed by: OTOLARYNGOLOGY

## 2022-10-07 PROCEDURE — 99203 OFFICE O/P NEW LOW 30 MIN: CPT | Mod: 25,S$GLB,, | Performed by: OTOLARYNGOLOGY

## 2022-10-07 PROCEDURE — 3077F PR MOST RECENT SYSTOLIC BLOOD PRESSURE >= 140 MM HG: ICD-10-PCS | Mod: CPTII,S$GLB,, | Performed by: OTOLARYNGOLOGY

## 2022-10-07 PROCEDURE — 1159F MED LIST DOCD IN RCRD: CPT | Mod: CPTII,S$GLB,, | Performed by: OTOLARYNGOLOGY

## 2022-10-07 PROCEDURE — 3008F PR BODY MASS INDEX (BMI) DOCUMENTED: ICD-10-PCS | Mod: CPTII,S$GLB,, | Performed by: OTOLARYNGOLOGY

## 2022-10-07 PROCEDURE — 3044F PR MOST RECENT HEMOGLOBIN A1C LEVEL <7.0%: ICD-10-PCS | Mod: CPTII,S$GLB,, | Performed by: OTOLARYNGOLOGY

## 2022-10-07 PROCEDURE — 4010F ACE/ARB THERAPY RXD/TAKEN: CPT | Mod: CPTII,S$GLB,, | Performed by: OTOLARYNGOLOGY

## 2022-10-07 PROCEDURE — 3080F PR MOST RECENT DIASTOLIC BLOOD PRESSURE >= 90 MM HG: ICD-10-PCS | Mod: CPTII,S$GLB,, | Performed by: OTOLARYNGOLOGY

## 2022-10-07 RX ORDER — FLUTICASONE PROPIONATE 50 MCG
2 SPRAY, SUSPENSION (ML) NASAL DAILY
Qty: 9.9 ML | Refills: 11 | Status: SHIPPED | OUTPATIENT
Start: 2022-10-07 | End: 2022-11-30

## 2022-10-07 NOTE — PROGRESS NOTES
Chief Complaint   Patient presents with    Other     Would like voice checked to be able to speak more loudly and experiencing hoarseness x 3 days   .     HPI:Claude E. Escude is a 62 y.o. male who has been referred by his speech therapist for evaluation and clearance for LSVT loud therapy for Parkinson's related dsyphonia.  He notes that he has started general ST. Has had a little hoarseness over the last 3 days. He has had some nasal congestion and sore throat as well. 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  He states that he has noticed that he is having more difficulty with vocal projection. He has also noticed that he is speaking at a more rapid rate. There are not pitch breaks or cracks. There is not vocal fatigue. He admits to dysphagia intermittently. He denies odynophagia, throat pain, and otalgia.  He denies history of heartburn or reflux.         Past Medical History:   Diagnosis Date    HTN (hypertension)      Social History     Socioeconomic History    Marital status:    Tobacco Use    Smoking status: Never    Smokeless tobacco: Never     No past surgical history on file.  Family History   Problem Relation Age of Onset    Heart disease Mother     Hypertension Mother     Diabetes Mellitus Mother     Arthritis Mother     Prostate cancer Father 48    No Known Problems Sister     Bone cancer Brother 43    Heart attack Brother 46        smoked    Heart disease Maternal Grandmother     Heart disease Maternal Grandfather     Heart disease Paternal Grandmother     Heart disease Paternal Grandfather            Review of Systems  General: negative for chills, fever or weight loss  Psychological: negative for mood changes or depression  Ophthalmic: negative for blurry vision, photophobia or eye pain  ENT: see HPI  Respiratory: no cough, shortness of breath, or wheezing  Cardiovascular: no chest pain or dyspnea on exertion  Gastrointestinal: no abdominal pain, change in bowel habits,  or black/ bloody stools  Musculoskeletal: negative for gait disturbance or muscular weakness  Neurological: no syncope or seizures; no ataxia  Dermatological: negative for puritis,  rash and jaundice  Hematologic/lymphatic: no easy bruising, no new lumps or bumps      Physical Exam:    Vitals:    10/07/22 1305   BP: (!) 169/91   Pulse: 64       Constitutional: Well appearing / communicating without difficutly.  NAD.  Eyes: EOM I Bilaterally  Head/Face: Normocephalic.  Negative paranasal sinus pressure/tenderness.  Salivary glands WNL.  House Brackmann I Bilaterally.    Right Ear: Auricle normal appearance. External Auditory Canal within normal limits no lesions or masses,TM w/o masses/lesions/perforations. TM mobility noted.   Left Ear: Auricle normal appearance. External Auditory Canal within normal limits no lesions or masses,TM w/o masses/lesions/perforations. TM mobility noted.  Nose: No gross nasal septal deviation. Inferior Turbinates 3+ bilaterally. No septal perforation. No masses/lesions. External nasal skin appears normal without masses/lesions.  Oral Cavity: Gingiva/lips within normal limits.  Dentition/gingiva healthy appearing. Mucus membranes moist. Floor of mouth soft, no masses palpated. Oral Tongue mobile. Hard Palate appears normal.    Oropharynx: Base of tongue appears normal. No masses/lesions noted. Tonsillar fossa/pharyngeal wall without lesions. Posterior oropharynx WNL.  Soft palate without masses. Midline uvula.   Neck/Lymphatic: No LAD I-VI bilaterally.  No thyromegaly.  No masses noted on exam.          See separate procedure note for FFL.    Diagnostic studies reviewed:       Assessment:    ICD-10-CM ICD-9-CM    1. Dysphonia  R49.0 784.42 Laryngoscopy      2. Parkinson's disease  G20 332.0       3. Pseudobulbar palsy  G12.29 335.23       4. Upper respiratory tract infection, unspecified type  J06.9 465.9         The primary encounter diagnosis was Dysphonia. Diagnoses of Parkinson's  disease, Pseudobulbar palsy, and Upper respiratory tract infection, unspecified type were also pertinent to this visit.      Plan:  Orders Placed This Encounter   Procedures    Laryngoscopy       Normal FFL exam today. Reassurance provided that patient may start LSVT Loud program with SLP.   Recommend saline rinse BID; Flonase 2 sprays per nostril daily  May use coricidin HBP prn congestion    Courtney Gordon MD

## 2022-10-07 NOTE — PROCEDURES
Laryngoscopy    Date/Time: 10/7/2022 1:00 PM  Performed by: Courtney Gordon MD  Authorized by: Courtney Gordon MD     Consent Done?:  Yes (Verbal)  Anesthesia:     Local anesthetic:  Lidocaine 2% and Souleymane-Synephrine 1/2%  Laryngoscopy:     Areas examined:  Nasal cavities, nasopharynx, oropharynx, hypopharynx, larynx and vocal cords  Nose External:      No external nasal deformity  Nose Intranasal:      Mucosa no polyps     Mucosa ulcers not present     No mucosa lesions present     No septum gross deformity     Turbinates not enlarged  Nasopharynx:      No mucosa lesions     Adenoids not present     Posterior choanae patent     Eustachian tube patent  Larynx/hypopharynx:      No epiglottis lesions     No epiglottis edema     No AE folds lesions     No vocal cord polyps     Equal and normal bilateral     No hypopharynx lesions     No piriform sinus pooling     No piriform sinus lesions     No post cricoid edema     No post cricoid erythema

## 2022-10-31 DIAGNOSIS — G20.C PARKINSONISM, UNSPECIFIED PARKINSONISM TYPE: Primary | ICD-10-CM

## 2022-11-23 PROBLEM — R26.89 BALANCE PROBLEM: Status: ACTIVE | Noted: 2022-11-23

## 2022-11-23 PROBLEM — Z74.09 IMPAIRED MOBILITY AND ADLS: Status: ACTIVE | Noted: 2022-11-23

## 2022-11-23 PROBLEM — Z78.9 IMPAIRED MOBILITY AND ADLS: Status: ACTIVE | Noted: 2022-11-23

## 2022-11-30 ENCOUNTER — OFFICE VISIT (OUTPATIENT)
Dept: NEUROLOGY | Facility: CLINIC | Age: 62
End: 2022-11-30
Payer: COMMERCIAL

## 2022-11-30 VITALS
BODY MASS INDEX: 25.05 KG/M2 | WEIGHT: 175 LBS | HEART RATE: 81 BPM | DIASTOLIC BLOOD PRESSURE: 87 MMHG | HEIGHT: 70 IN | SYSTOLIC BLOOD PRESSURE: 134 MMHG

## 2022-11-30 DIAGNOSIS — Z71.89 COUNSELING REGARDING GOALS OF CARE: ICD-10-CM

## 2022-11-30 DIAGNOSIS — G20.C PARKINSONISM, UNSPECIFIED PARKINSONISM TYPE: Primary | ICD-10-CM

## 2022-11-30 DIAGNOSIS — R47.89 RAPID RATE OF SPEECH: ICD-10-CM

## 2022-11-30 DIAGNOSIS — R26.81 GAIT INSTABILITY: ICD-10-CM

## 2022-11-30 PROCEDURE — 4010F PR ACE/ARB THEARPY RXD/TAKEN: ICD-10-PCS | Mod: CPTII,S$GLB,, | Performed by: PHYSICIAN ASSISTANT

## 2022-11-30 PROCEDURE — 1159F PR MEDICATION LIST DOCUMENTED IN MEDICAL RECORD: ICD-10-PCS | Mod: CPTII,S$GLB,, | Performed by: PHYSICIAN ASSISTANT

## 2022-11-30 PROCEDURE — 3008F BODY MASS INDEX DOCD: CPT | Mod: CPTII,S$GLB,, | Performed by: PHYSICIAN ASSISTANT

## 2022-11-30 PROCEDURE — 99999 PR PBB SHADOW E&M-EST. PATIENT-LVL III: CPT | Mod: PBBFAC,,, | Performed by: PHYSICIAN ASSISTANT

## 2022-11-30 PROCEDURE — 3044F HG A1C LEVEL LT 7.0%: CPT | Mod: CPTII,S$GLB,, | Performed by: PHYSICIAN ASSISTANT

## 2022-11-30 PROCEDURE — 3075F PR MOST RECENT SYSTOLIC BLOOD PRESS GE 130-139MM HG: ICD-10-PCS | Mod: CPTII,S$GLB,, | Performed by: PHYSICIAN ASSISTANT

## 2022-11-30 PROCEDURE — 3008F PR BODY MASS INDEX (BMI) DOCUMENTED: ICD-10-PCS | Mod: CPTII,S$GLB,, | Performed by: PHYSICIAN ASSISTANT

## 2022-11-30 PROCEDURE — 3044F PR MOST RECENT HEMOGLOBIN A1C LEVEL <7.0%: ICD-10-PCS | Mod: CPTII,S$GLB,, | Performed by: PHYSICIAN ASSISTANT

## 2022-11-30 PROCEDURE — 1160F RVW MEDS BY RX/DR IN RCRD: CPT | Mod: CPTII,S$GLB,, | Performed by: PHYSICIAN ASSISTANT

## 2022-11-30 PROCEDURE — 3079F DIAST BP 80-89 MM HG: CPT | Mod: CPTII,S$GLB,, | Performed by: PHYSICIAN ASSISTANT

## 2022-11-30 PROCEDURE — 3079F PR MOST RECENT DIASTOLIC BLOOD PRESSURE 80-89 MM HG: ICD-10-PCS | Mod: CPTII,S$GLB,, | Performed by: PHYSICIAN ASSISTANT

## 2022-11-30 PROCEDURE — 3075F SYST BP GE 130 - 139MM HG: CPT | Mod: CPTII,S$GLB,, | Performed by: PHYSICIAN ASSISTANT

## 2022-11-30 PROCEDURE — 99999 PR PBB SHADOW E&M-EST. PATIENT-LVL III: ICD-10-PCS | Mod: PBBFAC,,, | Performed by: PHYSICIAN ASSISTANT

## 2022-11-30 PROCEDURE — 99214 OFFICE O/P EST MOD 30 MIN: CPT | Mod: S$GLB,,, | Performed by: PHYSICIAN ASSISTANT

## 2022-11-30 PROCEDURE — 1159F MED LIST DOCD IN RCRD: CPT | Mod: CPTII,S$GLB,, | Performed by: PHYSICIAN ASSISTANT

## 2022-11-30 PROCEDURE — 99214 PR OFFICE/OUTPT VISIT, EST, LEVL IV, 30-39 MIN: ICD-10-PCS | Mod: S$GLB,,, | Performed by: PHYSICIAN ASSISTANT

## 2022-11-30 PROCEDURE — 1160F PR REVIEW ALL MEDS BY PRESCRIBER/CLIN PHARMACIST DOCUMENTED: ICD-10-PCS | Mod: CPTII,S$GLB,, | Performed by: PHYSICIAN ASSISTANT

## 2022-11-30 PROCEDURE — 4010F ACE/ARB THERAPY RXD/TAKEN: CPT | Mod: CPTII,S$GLB,, | Performed by: PHYSICIAN ASSISTANT

## 2022-11-30 RX ORDER — RASAGILINE 1 MG/1
1 TABLET ORAL DAILY
Qty: 30 TABLET | Refills: 11 | Status: SHIPPED | OUTPATIENT
Start: 2022-11-30 | End: 2023-03-08
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
## 2023-01-05 ENCOUNTER — TELEPHONE (OUTPATIENT)
Dept: HEMATOLOGY/ONCOLOGY | Facility: CLINIC | Age: 63
End: 2023-01-05

## 2023-01-05 NOTE — TELEPHONE ENCOUNTER
----- Message from Verenice Eason sent at 1/5/2023  2:34 PM CST -----  Regarding: reschedule appt  Contact: Pt  Pt Would like to reschedule appt    Please call    Phone [phone]

## 2023-01-31 NOTE — PROGRESS NOTES
PATIENT: Claude E. Escude  MRN: [id number]  DATE: 1/31/2023    Diagnosis:   1. Eosinopenia    2. Parkinson's disease    3. Advance care planning      Chief Complaint: eosinopenia    Oncologic History:      Oncologic History     Oncologic Treatment     Pathology       Subjective:    History of Present Illness: Mr. Holloway is a 62 y.o. male who presents for evaluation and management of decreased eosinophil count. He is referred by Dr. Stone.    - labs in 2021 and 2022 reveal an eosinophil count of 0%.  - he has the following comorbid conditions: Parkinson's disease.  - today, he is doing well. He denies shortness of breath, chest pain, nausea, vomiting, diarrhea, constipation. He denies fevers, frequent infections.        Past medical, surgical, family, and social histories have been reviewed and updated below.    Past Medical History:   Past Medical History:   Diagnosis Date    HTN (hypertension)        Past Surgical History: No past surgical history on file.    Family History:   Family History   Problem Relation Age of Onset    Heart disease Mother     Hypertension Mother     Diabetes Mellitus Mother     Arthritis Mother     Prostate cancer Father 48    No Known Problems Sister     Bone cancer Brother 43    Heart attack Brother 46        smoked    Heart disease Maternal Grandmother     Heart disease Maternal Grandfather     Heart disease Paternal Grandmother     Heart disease Paternal Grandfather        Social History:  reports that he has never smoked. He has never used smokeless tobacco.    Allergies:  Review of patient's allergies indicates:  No Known Allergies    Medications:  Current Outpatient Medications   Medication Sig Dispense Refill    carbidopa-levodopa  mg (SINEMET)  mg per tablet Take 1 tablet by mouth 3 (three) times daily. 90 tablet 11    losartan (COZAAR) 100 MG tablet Take 1 tablet (100 mg total) by mouth once daily. 90 tablet 3    rasagiline (AZILECT) 1 mg Tab Take 1 tablet (1 mg total) by  "mouth once daily. 30 tablet 11     No current facility-administered medications for this visit.       Review of Systems   Constitutional:  Negative for fatigue.   HENT:  Negative for sore throat.    Eyes:  Negative for visual disturbance.   Respiratory:  Negative for shortness of breath.    Cardiovascular:  Negative for chest pain.   Gastrointestinal:  Negative for abdominal pain.   Genitourinary:  Negative for dysuria.   Musculoskeletal:  Negative for back pain.   Skin:  Negative for rash.   Neurological:  Negative for headaches.   Hematological:  Negative for adenopathy.   Psychiatric/Behavioral:  The patient is not nervous/anxious.      ECOG Performance Status:   ECOG SCORE    0 - Fully active-able to carry on all pre-disease performance without restriction         Objective:      Vitals:   Vitals:    02/01/23 1017   BP: (!) 156/82   BP Location: Right arm   Patient Position: Sitting   BP Method: Large (Automatic)   Pulse: 85   Resp: 18   SpO2: 98%   Weight: 81 kg (178 lb 9.2 oz)   Height: 5' 10" (1.778 m)     BMI: Body mass index is 25.62 kg/m².    Physical Exam  Vitals and nursing note reviewed.   Constitutional:       Appearance: He is well-developed.   HENT:      Head: Normocephalic and atraumatic.   Eyes:      Pupils: Pupils are equal, round, and reactive to light.   Cardiovascular:      Rate and Rhythm: Normal rate and regular rhythm.   Pulmonary:      Effort: Pulmonary effort is normal.      Breath sounds: Normal breath sounds.   Abdominal:      General: Bowel sounds are normal.      Palpations: Abdomen is soft.   Musculoskeletal:         General: Normal range of motion.      Cervical back: Normal range of motion and neck supple.   Skin:     General: Skin is warm and dry.   Neurological:      Mental Status: He is alert and oriented to person, place, and time.   Psychiatric:         Behavior: Behavior normal.         Thought Content: Thought content normal.         Judgment: Judgment normal. "       Laboratory Data:  Labs have been reviewed.    Lab Results   Component Value Date    WBC 4.4 08/24/2022    HGB 14.1 08/24/2022    HCT 42.3 08/24/2022    MCV 93.6 08/24/2022     08/24/2022       Imaging:      Assessment:       1. Eosinopenia    2. Parkinson's disease    3. Advance care planning           Plan:     Decreased eosinophils  - I have reviewed his chart  - labs in 2021 and 2022 reveal an eosinophil count of 0%.  - clinically, he does not appear to have consequences of a decreased eosinophil count.  - he takes carbidopa-levodopa, which can cause a leukopenia, but I haven't read about it specifically causing eosinopenia  - I will perform an abbreviated evaluation.  - I will contact him with results of testing. If needed, I will schedule a follow-up appointment.    2. Parkinsonism  - on carbidopa-levodopa, rasagiline  -     3. Advance Care Planning     Date: 02/01/2023    Power of   I initiated the process of advance care planning today and explained the importance of this process to the patient.  I introduced the concept of advance directives to the patient, as well. Then the patient received detailed information about the importance of designating a Health Care Power of  (HCPOA). He was also instructed to communicate with this person about their wishes for future healthcare, should he become sick and lose decision-making capacity. The patient has not previously appointed a HCPOA. After our discussion, the patient has decided to complete a HCPOA and has appointed his  wife Pippa Holloway ([phone]) . I spent a total time of 16 minutes discussing this issue with the patient.          - I will contact him with results of testing. If needed, I will schedule a follow-up appointment.      Hudson Pool M.D.  Hematology/Oncology  [facility] - [address], [address]  [place] [address]  Phone: [phone]  Fax: [phone]

## 2023-02-01 ENCOUNTER — LAB VISIT (OUTPATIENT)
Dept: LAB | Facility: HOSPITAL | Age: 63
End: 2023-02-01
Attending: INTERNAL MEDICINE
Payer: COMMERCIAL

## 2023-02-01 ENCOUNTER — PATIENT MESSAGE (OUTPATIENT)
Dept: NEUROLOGY | Facility: CLINIC | Age: 63
End: 2023-02-01
Payer: MEDICARE

## 2023-02-01 ENCOUNTER — OFFICE VISIT (OUTPATIENT)
Dept: HEMATOLOGY/ONCOLOGY | Facility: CLINIC | Age: 63
End: 2023-02-01
Payer: COMMERCIAL

## 2023-02-01 VITALS
DIASTOLIC BLOOD PRESSURE: 82 MMHG | BODY MASS INDEX: 25.56 KG/M2 | WEIGHT: 178.56 LBS | SYSTOLIC BLOOD PRESSURE: 156 MMHG | OXYGEN SATURATION: 98 % | HEIGHT: 70 IN | HEART RATE: 85 BPM | RESPIRATION RATE: 18 BRPM

## 2023-02-01 DIAGNOSIS — D70.9 EOSINOPENIA: ICD-10-CM

## 2023-02-01 DIAGNOSIS — R89.9 ABNORMAL LABORATORY TEST: ICD-10-CM

## 2023-02-01 DIAGNOSIS — D70.9 EOSINOPENIA: Primary | ICD-10-CM

## 2023-02-01 DIAGNOSIS — G20.A1 PARKINSON'S DISEASE: ICD-10-CM

## 2023-02-01 DIAGNOSIS — Z71.89 ADVANCE CARE PLANNING: ICD-10-CM

## 2023-02-01 LAB
BASOPHILS # BLD AUTO: 0.02 K/UL (ref 0–0.2)
BASOPHILS NFR BLD: 0.4 % (ref 0–1.9)
CRP SERPL-MCNC: 0.7 MG/L (ref 0–8.2)
DIFFERENTIAL METHOD: ABNORMAL
EOSINOPHIL # BLD AUTO: 0 K/UL (ref 0–0.5)
EOSINOPHIL NFR BLD: 0 % (ref 0–8)
ERYTHROCYTE [DISTWIDTH] IN BLOOD BY AUTOMATED COUNT: 11.8 % (ref 11.5–14.5)
ERYTHROCYTE [SEDIMENTATION RATE] IN BLOOD BY PHOTOMETRIC METHOD: 3 MM/HR (ref 0–23)
HCT VFR BLD AUTO: 43.3 % (ref 40–54)
HGB BLD-MCNC: 14.7 G/DL (ref 14–18)
IMM GRANULOCYTES # BLD AUTO: 0.02 K/UL (ref 0–0.04)
IMM GRANULOCYTES NFR BLD AUTO: 0.4 % (ref 0–0.5)
LYMPHOCYTES # BLD AUTO: 1.1 K/UL (ref 1–4.8)
LYMPHOCYTES NFR BLD: 21.9 % (ref 18–48)
MCH RBC QN AUTO: 31.3 PG (ref 27–31)
MCHC RBC AUTO-ENTMCNC: 33.9 G/DL (ref 32–36)
MCV RBC AUTO: 92 FL (ref 82–98)
MONOCYTES # BLD AUTO: 0.4 K/UL (ref 0.3–1)
MONOCYTES NFR BLD: 8.5 % (ref 4–15)
NEUTROPHILS # BLD AUTO: 3.3 K/UL (ref 1.8–7.7)
NEUTROPHILS NFR BLD: 68.8 % (ref 38–73)
NRBC BLD-RTO: 0 /100 WBC
PATH REV BLD -IMP: NORMAL
PATH REV BLD -IMP: NORMAL
PLATELET # BLD AUTO: 178 K/UL (ref 150–450)
PMV BLD AUTO: 10.3 FL (ref 9.2–12.9)
RBC # BLD AUTO: 4.7 M/UL (ref 4.6–6.2)
WBC # BLD AUTO: 4.84 K/UL (ref 3.9–12.7)

## 2023-02-01 PROCEDURE — 99497 PR ADVNCD CARE PLAN 30 MIN: ICD-10-PCS | Mod: S$GLB,,, | Performed by: INTERNAL MEDICINE

## 2023-02-01 PROCEDURE — 36415 COLL VENOUS BLD VENIPUNCTURE: CPT | Performed by: INTERNAL MEDICINE

## 2023-02-01 PROCEDURE — 99204 OFFICE O/P NEW MOD 45 MIN: CPT | Mod: S$GLB,,, | Performed by: INTERNAL MEDICINE

## 2023-02-01 PROCEDURE — 85060 BLOOD SMEAR INTERPRETATION: CPT | Mod: ,,, | Performed by: PATHOLOGY

## 2023-02-01 PROCEDURE — 3077F SYST BP >= 140 MM HG: CPT | Mod: CPTII,S$GLB,, | Performed by: INTERNAL MEDICINE

## 2023-02-01 PROCEDURE — 1159F MED LIST DOCD IN RCRD: CPT | Mod: CPTII,S$GLB,, | Performed by: INTERNAL MEDICINE

## 2023-02-01 PROCEDURE — 1160F RVW MEDS BY RX/DR IN RCRD: CPT | Mod: CPTII,S$GLB,, | Performed by: INTERNAL MEDICINE

## 2023-02-01 PROCEDURE — 99999 PR PBB SHADOW E&M-EST. PATIENT-LVL IV: CPT | Mod: PBBFAC,,, | Performed by: INTERNAL MEDICINE

## 2023-02-01 PROCEDURE — 85060 PATHOLOGIST REVIEW: ICD-10-PCS | Mod: ,,, | Performed by: PATHOLOGY

## 2023-02-01 PROCEDURE — 85652 RBC SED RATE AUTOMATED: CPT | Performed by: INTERNAL MEDICINE

## 2023-02-01 PROCEDURE — 1160F PR REVIEW ALL MEDS BY PRESCRIBER/CLIN PHARMACIST DOCUMENTED: ICD-10-PCS | Mod: CPTII,S$GLB,, | Performed by: INTERNAL MEDICINE

## 2023-02-01 PROCEDURE — 1159F PR MEDICATION LIST DOCUMENTED IN MEDICAL RECORD: ICD-10-PCS | Mod: CPTII,S$GLB,, | Performed by: INTERNAL MEDICINE

## 2023-02-01 PROCEDURE — 99999 PR PBB SHADOW E&M-EST. PATIENT-LVL IV: ICD-10-PCS | Mod: PBBFAC,,, | Performed by: INTERNAL MEDICINE

## 2023-02-01 PROCEDURE — 86038 ANTINUCLEAR ANTIBODIES: CPT | Performed by: INTERNAL MEDICINE

## 2023-02-01 PROCEDURE — 3077F PR MOST RECENT SYSTOLIC BLOOD PRESSURE >= 140 MM HG: ICD-10-PCS | Mod: CPTII,S$GLB,, | Performed by: INTERNAL MEDICINE

## 2023-02-01 PROCEDURE — 99204 PR OFFICE/OUTPT VISIT, NEW, LEVL IV, 45-59 MIN: ICD-10-PCS | Mod: S$GLB,,, | Performed by: INTERNAL MEDICINE

## 2023-02-01 PROCEDURE — 3079F PR MOST RECENT DIASTOLIC BLOOD PRESSURE 80-89 MM HG: ICD-10-PCS | Mod: CPTII,S$GLB,, | Performed by: INTERNAL MEDICINE

## 2023-02-01 PROCEDURE — 99497 ADVNCD CARE PLAN 30 MIN: CPT | Mod: S$GLB,,, | Performed by: INTERNAL MEDICINE

## 2023-02-01 PROCEDURE — 86140 C-REACTIVE PROTEIN: CPT | Performed by: INTERNAL MEDICINE

## 2023-02-01 PROCEDURE — 3008F PR BODY MASS INDEX (BMI) DOCUMENTED: ICD-10-PCS | Mod: CPTII,S$GLB,, | Performed by: INTERNAL MEDICINE

## 2023-02-01 PROCEDURE — 3008F BODY MASS INDEX DOCD: CPT | Mod: CPTII,S$GLB,, | Performed by: INTERNAL MEDICINE

## 2023-02-01 PROCEDURE — 85025 COMPLETE CBC W/AUTO DIFF WBC: CPT | Performed by: INTERNAL MEDICINE

## 2023-02-01 PROCEDURE — 3079F DIAST BP 80-89 MM HG: CPT | Mod: CPTII,S$GLB,, | Performed by: INTERNAL MEDICINE

## 2023-02-02 LAB — ANA SER QL IF: NORMAL

## 2023-02-03 ENCOUNTER — TELEPHONE (OUTPATIENT)
Dept: HEMATOLOGY/ONCOLOGY | Facility: CLINIC | Age: 63
End: 2023-02-03
Payer: MEDICARE

## 2023-02-03 NOTE — TELEPHONE ENCOUNTER
I called and reviewed his labs. No further workup at this time.    Hudson Pool M.D.  Hematology/Oncology  [facility] - [address], [address]  [place] [address]  Phone: [phone]  Fax: [phone]

## 2023-02-08 ENCOUNTER — TELEPHONE (OUTPATIENT)
Dept: NEUROLOGY | Facility: CLINIC | Age: 63
End: 2023-02-08
Payer: MEDICARE

## 2023-02-15 ENCOUNTER — OFFICE VISIT (OUTPATIENT)
Dept: FAMILY MEDICINE | Facility: CLINIC | Age: 63
End: 2023-02-15
Payer: COMMERCIAL

## 2023-02-15 VITALS
WEIGHT: 178.81 LBS | SYSTOLIC BLOOD PRESSURE: 138 MMHG | TEMPERATURE: 98 F | OXYGEN SATURATION: 95 % | BODY MASS INDEX: 25.6 KG/M2 | DIASTOLIC BLOOD PRESSURE: 88 MMHG | HEART RATE: 88 BPM | HEIGHT: 70 IN

## 2023-02-15 DIAGNOSIS — R73.9 HYPERGLYCEMIA: ICD-10-CM

## 2023-02-15 DIAGNOSIS — I10 ESSENTIAL HYPERTENSION: Primary | ICD-10-CM

## 2023-02-15 DIAGNOSIS — G20.A1 PARKINSON'S DISEASE: ICD-10-CM

## 2023-02-15 PROCEDURE — 1159F PR MEDICATION LIST DOCUMENTED IN MEDICAL RECORD: ICD-10-PCS | Mod: CPTII,S$GLB,, | Performed by: INTERNAL MEDICINE

## 2023-02-15 PROCEDURE — 1100F PTFALLS ASSESS-DOCD GE2>/YR: CPT | Mod: CPTII,S$GLB,, | Performed by: INTERNAL MEDICINE

## 2023-02-15 PROCEDURE — 1157F PR ADVANCE CARE PLAN OR EQUIV PRESENT IN MEDICAL RECORD: ICD-10-PCS | Mod: CPTII,S$GLB,, | Performed by: INTERNAL MEDICINE

## 2023-02-15 PROCEDURE — 3079F PR MOST RECENT DIASTOLIC BLOOD PRESSURE 80-89 MM HG: ICD-10-PCS | Mod: CPTII,S$GLB,, | Performed by: INTERNAL MEDICINE

## 2023-02-15 PROCEDURE — 1157F ADVNC CARE PLAN IN RCRD: CPT | Mod: CPTII,S$GLB,, | Performed by: INTERNAL MEDICINE

## 2023-02-15 PROCEDURE — 99214 PR OFFICE/OUTPT VISIT, EST, LEVL IV, 30-39 MIN: ICD-10-PCS | Mod: S$GLB,,, | Performed by: INTERNAL MEDICINE

## 2023-02-15 PROCEDURE — 3079F DIAST BP 80-89 MM HG: CPT | Mod: CPTII,S$GLB,, | Performed by: INTERNAL MEDICINE

## 2023-02-15 PROCEDURE — 99214 OFFICE O/P EST MOD 30 MIN: CPT | Mod: S$GLB,,, | Performed by: INTERNAL MEDICINE

## 2023-02-15 PROCEDURE — 99999 PR PBB SHADOW E&M-EST. PATIENT-LVL III: CPT | Mod: PBBFAC,,, | Performed by: INTERNAL MEDICINE

## 2023-02-15 PROCEDURE — 3075F SYST BP GE 130 - 139MM HG: CPT | Mod: CPTII,S$GLB,, | Performed by: INTERNAL MEDICINE

## 2023-02-15 PROCEDURE — 1100F PR PT FALLS ASSESS DOC 2+ FALLS/FALL W/INJURY/YR: ICD-10-PCS | Mod: CPTII,S$GLB,, | Performed by: INTERNAL MEDICINE

## 2023-02-15 PROCEDURE — 1160F RVW MEDS BY RX/DR IN RCRD: CPT | Mod: CPTII,S$GLB,, | Performed by: INTERNAL MEDICINE

## 2023-02-15 PROCEDURE — 3288F PR FALLS RISK ASSESSMENT DOCUMENTED: ICD-10-PCS | Mod: CPTII,S$GLB,, | Performed by: INTERNAL MEDICINE

## 2023-02-15 PROCEDURE — 1159F MED LIST DOCD IN RCRD: CPT | Mod: CPTII,S$GLB,, | Performed by: INTERNAL MEDICINE

## 2023-02-15 PROCEDURE — 3008F PR BODY MASS INDEX (BMI) DOCUMENTED: ICD-10-PCS | Mod: CPTII,S$GLB,, | Performed by: INTERNAL MEDICINE

## 2023-02-15 PROCEDURE — 3288F FALL RISK ASSESSMENT DOCD: CPT | Mod: CPTII,S$GLB,, | Performed by: INTERNAL MEDICINE

## 2023-02-15 PROCEDURE — 99999 PR PBB SHADOW E&M-EST. PATIENT-LVL III: ICD-10-PCS | Mod: PBBFAC,,, | Performed by: INTERNAL MEDICINE

## 2023-02-15 PROCEDURE — 3008F BODY MASS INDEX DOCD: CPT | Mod: CPTII,S$GLB,, | Performed by: INTERNAL MEDICINE

## 2023-02-15 PROCEDURE — 1160F PR REVIEW ALL MEDS BY PRESCRIBER/CLIN PHARMACIST DOCUMENTED: ICD-10-PCS | Mod: CPTII,S$GLB,, | Performed by: INTERNAL MEDICINE

## 2023-02-15 PROCEDURE — 3075F PR MOST RECENT SYSTOLIC BLOOD PRESS GE 130-139MM HG: ICD-10-PCS | Mod: CPTII,S$GLB,, | Performed by: INTERNAL MEDICINE

## 2023-02-15 NOTE — PROGRESS NOTES
Subjective:       Patient ID: Claude E. Escude is a 63 y.o. male.    Chief Complaint: Follow-up (6 month)      HPI  Claude E. Escude is a 63 y.o. male with chronic conditions of hypertension and recently diagnosed with Parkinson's disease who presents today for routine health maintenance.    Reports treated with Sinemet and with Rasagilin which he is tolerating but does not feel too much difference.  Receive physical and speech therapy.  His speech is still pressured but volume is good.  Has occasional trouble swallowing, mostly French fries and some meats.  Cost and clears his throat.  Advised to drink fluids when he swallows but not a habit.  Has not been falling as he used to.    Blood pressure has been stable.  Reports compliant with treatment.  No chest pains, palpitations, or lightheadedness    Will be retiring in 3 days and looking forward to change.    Has no toxic habits.  Declines vaccination.    Health Maintenance:  Health Maintenance   Topic Date Due    Hepatitis C Screening  Never done    Lipid Panel  08/24/2027    TETANUS VACCINE  08/17/2032       Review of Systems   Constitutional: Negative.  Negative for fever and unexpected weight change.   HENT:  Positive for trouble swallowing.    Respiratory: Negative.     Cardiovascular: Negative.    Gastrointestinal: Negative.    Genitourinary:  Negative for difficulty urinating.   Musculoskeletal: Negative.    Integumentary:  Positive for rash.   Neurological:  Positive for tremors and speech difficulty.   Psychiatric/Behavioral: Negative.      Past Medical History:   Diagnosis Date    HTN (hypertension)        History reviewed. No pertinent surgical history.    Family History   Problem Relation Age of Onset    Heart disease Mother     Hypertension Mother     Diabetes Mellitus Mother     Arthritis Mother     Prostate cancer Father 48    No Known Problems Sister     Bone cancer Brother 43    Heart attack Brother 46        smoked    Heart disease Maternal  Grandmother     Heart disease Maternal Grandfather     Heart disease Paternal Grandmother     Heart disease Paternal Grandfather        Social History     Socioeconomic History    Marital status:    Tobacco Use    Smoking status: Never    Smokeless tobacco: Never       Current Outpatient Medications   Medication Sig Dispense Refill    carbidopa-levodopa  mg (SINEMET)  mg per tablet Take 1 tablet by mouth 3 (three) times daily. 90 tablet 11    losartan (COZAAR) 100 MG tablet Take 1 tablet (100 mg total) by mouth once daily. 90 tablet 3    rasagiline (AZILECT) 1 mg Tab Take 1 tablet (1 mg total) by mouth once daily. 30 tablet 11     No current facility-administered medications for this visit.       Review of patient's allergies indicates:  No Known Allergies      Objective:       Last 3 sets of Vitals    Vitals - 1 value per visit 2/1/2023 2/15/2023 2/15/2023   SYSTOLIC 156 - 138   DIASTOLIC 82 - 88   Pulse 85 - 88   Temp - - 97.9   Resp 18 - -   SPO2 98 - 95   Weight (lb) 178.57 - 178.79   Weight (kg) 81 - 81.1   Height 70 - 70   BMI (Calculated) 25.6 - 25.7   VISIT REPORT - - -   Pain Score  - 0 -   Physical Exam  Constitutional:       General: He is not in acute distress.  HENT:      Head: Normocephalic.      Right Ear: Tympanic membrane, ear canal and external ear normal.      Left Ear: Tympanic membrane, ear canal and external ear normal.      Nose: Nose normal.      Mouth/Throat:      Mouth: Mucous membranes are moist.   Eyes:      General: No scleral icterus.     Extraocular Movements: Extraocular movements intact.      Conjunctiva/sclera: Conjunctivae normal.   Neck:      Vascular: No carotid bruit.      Comments: No goiter.  Cardiovascular:      Rate and Rhythm: Normal rate and regular rhythm.      Pulses: Normal pulses.      Heart sounds: Normal heart sounds.   Pulmonary:      Effort: Pulmonary effort is normal.      Breath sounds: Normal breath sounds.   Abdominal:      General: Bowel  sounds are normal. There is no distension.      Palpations: Abdomen is soft. There is no mass.      Tenderness: There is no abdominal tenderness.   Musculoskeletal:         General: No swelling.   Lymphadenopathy:      Cervical: No cervical adenopathy.   Skin:     General: Skin is warm and dry.   Neurological:      General: No focal deficit present.      Mental Status: He is alert and oriented to person, place, and time.      Comments: No tremor noted or rigidity.  Speech frequently fast.   Psychiatric:         Mood and Affect: Mood normal.         Behavior: Behavior normal.         CBC:  Recent Labs   Lab 03/15/21  0713 08/24/22  0834 02/01/23  1110   WBC 5.2 4.4 4.84   RBC 4.86 4.52 4.70   Hemoglobin 15.0 14.1 14.7   Hematocrit 45.0 42.3 43.3   Platelets 185 193 178   MCV 92.6 93.6 92   MCH 30.9 31.2 31.3 H   MCHC 33.3 33.3 33.9     CMP:  Recent Labs   Lab 03/15/21  0713 08/24/22  0834   Glucose 115 H 104 H   Calcium 9.1 9.3   Albumin 4.5 4.5   Total Protein 6.4 6.4   Sodium 140 143   Potassium 3.9 4.1   CO2 27 29   Chloride 105 107   BUN 14 16   Creatinine 0.93 0.85   ALT 15 13   AST 14 13   Total Bilirubin 0.9 0.8     URINALYSIS:       LIPIDS:  Recent Labs   Lab 03/15/21  0713 08/11/21  1428 08/24/22  0834   TSH 1.90 0.912 2.40   HDL 58  --  51   Cholesterol 152  --  159   Triglycerides 76  --  69   LDL Cholesterol 78  --  92   HDL/Cholesterol Ratio 2.6  --  3.1   Non HDL Chol. (LDL+VLDL) 94  --  108     TSH:  Recent Labs   Lab 03/15/21  0713 08/11/21  1428 08/24/22  0834   TSH 1.90 0.912 2.40       A1C:  Recent Labs   Lab 03/24/21  1523 08/24/22  0834   Hemoglobin A1C 5.3 5.4       Imaging:  Fl Modified Barium Swallow Speech  Narrative: EXAMINATION:  FL MODIFIED BARIUM SWALLOW SPEECH STUDY    CLINICAL HISTORY:  Dysphagia, oropharyngeal phase    TECHNIQUE:  Conjunction with speech pathology, the patient ingested barium of varying consistencies to include thin, pudding, barium coated peaches, and  cracker.    Fluoroscopy time: 2.8 minutes.    COMPARISON:  None.    FINDINGS:  Mild weakened pharyngeal constriction across tested consistencies with mild pharyngeal residue.  Two instances of purposeful oropharyngeal regurgitation and re-swallow with barium coated cracker.  No convincing laryngeal penetration or tracheal aspiration.  Some sticking of the barium tablet within the oral cavity on initial swallow attempt.  The pill was swallowed without difficulty on subsequent swallow attempt.  Impression: As above.    Please see separate speech pathology report further detail.    Electronically signed by: Julien Ackerman  Date:    01/26/2022  Time:    11:40      Assessment:       1. Essential hypertension    2. Parkinson's disease    3. Hyperglycemia              Plan:       1. Essential hypertension  -     Comprehensive Metabolic Panel; Future; Expected date: 02/15/2023  -     Hemoglobin A1C; Future; Expected date: 02/15/2023  -     Lipid Panel; Future; Expected date: 02/15/2023  -     TSH; Future; Expected date: 02/15/2023  - controlled.  Monitor diastolic blood pressure as his mildly elevated.  Healthy diet recommended.    2. Parkinson's disease   - Follow-up with Neurology.    3. Hyperglycemia  -     Hemoglobin A1C; Future; Expected date: 02/15/2023  - A1c has been normal with mild elevation of the fasting glucose.       Health Maintenance Due   Topic Date Due    Hepatitis C Screening  Never done            Return to clinic in 6 months.    Evangelina Stone MD  [facility]  Disclaimer:  This note has been generated using voice-recognition software. There may be grammatical or spelling errors that have been missed during proof-reading

## 2023-02-17 ENCOUNTER — LAB VISIT (OUTPATIENT)
Dept: LAB | Facility: HOSPITAL | Age: 63
End: 2023-02-17
Attending: INTERNAL MEDICINE
Payer: COMMERCIAL

## 2023-02-17 DIAGNOSIS — Z12.5 PROSTATE CANCER SCREENING: ICD-10-CM

## 2023-02-17 DIAGNOSIS — I10 ESSENTIAL HYPERTENSION: ICD-10-CM

## 2023-02-17 LAB
ALBUMIN SERPL BCP-MCNC: 4.2 G/DL (ref 3.5–5.2)
ALP SERPL-CCNC: 56 U/L (ref 55–135)
ALT SERPL W/O P-5'-P-CCNC: <5 U/L (ref 10–44)
ANION GAP SERPL CALC-SCNC: 8 MMOL/L (ref 8–16)
AST SERPL-CCNC: 15 U/L (ref 10–40)
BASOPHILS # BLD AUTO: 0.02 K/UL (ref 0–0.2)
BASOPHILS NFR BLD: 0.4 % (ref 0–1.9)
BILIRUB SERPL-MCNC: 1 MG/DL (ref 0.1–1)
BUN SERPL-MCNC: 11 MG/DL (ref 8–23)
CALCIUM SERPL-MCNC: 9.3 MG/DL (ref 8.7–10.5)
CHLORIDE SERPL-SCNC: 106 MMOL/L (ref 95–110)
CHOLEST SERPL-MCNC: 150 MG/DL (ref 120–199)
CHOLEST/HDLC SERPL: 3.1 {RATIO} (ref 2–5)
CO2 SERPL-SCNC: 26 MMOL/L (ref 23–29)
COMPLEXED PSA SERPL-MCNC: 0.86 NG/ML (ref 0–4)
CREAT SERPL-MCNC: 1 MG/DL (ref 0.5–1.4)
DIFFERENTIAL METHOD: ABNORMAL
EOSINOPHIL # BLD AUTO: 0 K/UL (ref 0–0.5)
EOSINOPHIL NFR BLD: 0 % (ref 0–8)
ERYTHROCYTE [DISTWIDTH] IN BLOOD BY AUTOMATED COUNT: 11.9 % (ref 11.5–14.5)
EST. GFR  (NO RACE VARIABLE): >60 ML/MIN/1.73 M^2
ESTIMATED AVG GLUCOSE: 108 MG/DL (ref 68–131)
GLUCOSE SERPL-MCNC: 110 MG/DL (ref 70–110)
HBA1C MFR BLD: 5.4 % (ref 4–5.6)
HCT VFR BLD AUTO: 44 % (ref 40–54)
HDLC SERPL-MCNC: 49 MG/DL (ref 40–75)
HDLC SERPL: 32.7 % (ref 20–50)
HGB BLD-MCNC: 15 G/DL (ref 14–18)
IMM GRANULOCYTES # BLD AUTO: 0.02 K/UL (ref 0–0.04)
IMM GRANULOCYTES NFR BLD AUTO: 0.4 % (ref 0–0.5)
LDLC SERPL CALC-MCNC: 87.2 MG/DL (ref 63–159)
LYMPHOCYTES # BLD AUTO: 1.1 K/UL (ref 1–4.8)
LYMPHOCYTES NFR BLD: 22.7 % (ref 18–48)
MCH RBC QN AUTO: 31.3 PG (ref 27–31)
MCHC RBC AUTO-ENTMCNC: 34.1 G/DL (ref 32–36)
MCV RBC AUTO: 92 FL (ref 82–98)
MONOCYTES # BLD AUTO: 0.4 K/UL (ref 0.3–1)
MONOCYTES NFR BLD: 8.3 % (ref 4–15)
NEUTROPHILS # BLD AUTO: 3.3 K/UL (ref 1.8–7.7)
NEUTROPHILS NFR BLD: 68.2 % (ref 38–73)
NONHDLC SERPL-MCNC: 101 MG/DL
NRBC BLD-RTO: 0 /100 WBC
PLATELET # BLD AUTO: 189 K/UL (ref 150–450)
PMV BLD AUTO: 10.5 FL (ref 9.2–12.9)
POTASSIUM SERPL-SCNC: 4.2 MMOL/L (ref 3.5–5.1)
PROT SERPL-MCNC: 6.7 G/DL (ref 6–8.4)
RBC # BLD AUTO: 4.79 M/UL (ref 4.6–6.2)
SODIUM SERPL-SCNC: 140 MMOL/L (ref 136–145)
TRIGL SERPL-MCNC: 69 MG/DL (ref 30–150)
TSH SERPL DL<=0.005 MIU/L-ACNC: 1.22 UIU/ML (ref 0.4–4)
WBC # BLD AUTO: 4.84 K/UL (ref 3.9–12.7)

## 2023-02-17 PROCEDURE — 80053 COMPREHEN METABOLIC PANEL: CPT | Performed by: INTERNAL MEDICINE

## 2023-02-17 PROCEDURE — 84153 ASSAY OF PSA TOTAL: CPT | Performed by: INTERNAL MEDICINE

## 2023-02-17 PROCEDURE — 84443 ASSAY THYROID STIM HORMONE: CPT | Performed by: INTERNAL MEDICINE

## 2023-02-17 PROCEDURE — 85025 COMPLETE CBC W/AUTO DIFF WBC: CPT | Performed by: INTERNAL MEDICINE

## 2023-02-17 PROCEDURE — 36415 COLL VENOUS BLD VENIPUNCTURE: CPT | Performed by: INTERNAL MEDICINE

## 2023-02-17 PROCEDURE — 80061 LIPID PANEL: CPT | Performed by: INTERNAL MEDICINE

## 2023-02-17 PROCEDURE — 83036 HEMOGLOBIN GLYCOSYLATED A1C: CPT | Performed by: INTERNAL MEDICINE

## 2023-02-19 ENCOUNTER — PATIENT MESSAGE (OUTPATIENT)
Dept: FAMILY MEDICINE | Facility: CLINIC | Age: 63
End: 2023-02-19
Payer: MEDICARE

## 2023-03-08 ENCOUNTER — PATIENT MESSAGE (OUTPATIENT)
Dept: NEUROLOGY | Facility: CLINIC | Age: 63
End: 2023-03-08
Payer: MEDICARE

## 2023-03-08 DIAGNOSIS — G20.C PARKINSONISM, UNSPECIFIED PARKINSONISM TYPE: Primary | ICD-10-CM

## 2023-03-08 RX ORDER — SELEGILINE HYDROCHLORIDE 5 MG/1
5 TABLET ORAL 2 TIMES DAILY WITH MEALS
Qty: 60 TABLET | Refills: 11 | Status: SHIPPED | OUTPATIENT
Start: 2023-03-08 | End: 2024-01-16 | Stop reason: SDUPTHER

## 2023-04-05 ENCOUNTER — OFFICE VISIT (OUTPATIENT)
Dept: NEUROLOGY | Facility: CLINIC | Age: 63
End: 2023-04-05
Payer: MEDICARE

## 2023-04-05 VITALS
DIASTOLIC BLOOD PRESSURE: 85 MMHG | HEIGHT: 70 IN | BODY MASS INDEX: 25.28 KG/M2 | HEART RATE: 77 BPM | WEIGHT: 176.56 LBS | SYSTOLIC BLOOD PRESSURE: 148 MMHG

## 2023-04-05 DIAGNOSIS — R26.81 GAIT INSTABILITY: ICD-10-CM

## 2023-04-05 DIAGNOSIS — R26.81 UNSTEADY GAIT: ICD-10-CM

## 2023-04-05 DIAGNOSIS — R47.89 RAPID RATE OF SPEECH: ICD-10-CM

## 2023-04-05 DIAGNOSIS — G20.C PARKINSONISM, UNSPECIFIED PARKINSONISM TYPE: Primary | ICD-10-CM

## 2023-04-05 DIAGNOSIS — Z71.89 COUNSELING REGARDING GOALS OF CARE: ICD-10-CM

## 2023-04-05 PROBLEM — G12.29 PSEUDOBULBAR PALSY: Status: RESOLVED | Noted: 2021-08-16 | Resolved: 2023-04-05

## 2023-04-05 PROCEDURE — 99214 PR OFFICE/OUTPT VISIT, EST, LEVL IV, 30-39 MIN: ICD-10-PCS | Mod: S$PBB,,, | Performed by: PSYCHIATRY & NEUROLOGY

## 2023-04-05 PROCEDURE — 99499 UNLISTED E&M SERVICE: CPT | Mod: ,,, | Performed by: PSYCHIATRY & NEUROLOGY

## 2023-04-05 PROCEDURE — 99214 OFFICE O/P EST MOD 30 MIN: CPT | Mod: S$PBB,,, | Performed by: PSYCHIATRY & NEUROLOGY

## 2023-04-05 PROCEDURE — 99999 PR PBB SHADOW E&M-EST. PATIENT-LVL III: ICD-10-PCS | Mod: PBBFAC,,, | Performed by: PSYCHIATRY & NEUROLOGY

## 2023-04-05 PROCEDURE — 99999 PR PBB SHADOW E&M-EST. PATIENT-LVL III: CPT | Mod: PBBFAC,,, | Performed by: PSYCHIATRY & NEUROLOGY

## 2023-04-05 PROCEDURE — 99213 OFFICE O/P EST LOW 20 MIN: CPT | Mod: PBBFAC | Performed by: PSYCHIATRY & NEUROLOGY

## 2023-04-05 PROCEDURE — 99499 NO LOS: ICD-10-PCS | Mod: ,,, | Performed by: PSYCHIATRY & NEUROLOGY

## 2023-04-05 RX ORDER — CARBIDOPA AND LEVODOPA 25; 100 MG/1; MG/1
1.5 TABLET ORAL 3 TIMES DAILY
Qty: 405 TABLET | Refills: 3 | Status: SHIPPED | OUTPATIENT
Start: 2023-04-05 | End: 2023-05-15
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
## 2023-05-13 DIAGNOSIS — G20.C PARKINSONISM, UNSPECIFIED PARKINSONISM TYPE: ICD-10-CM

## 2023-05-15 RX ORDER — CARBIDOPA AND LEVODOPA 25; 100 MG/1; MG/1
TABLET ORAL
Qty: 270 TABLET | Refills: 3 | Status: SHIPPED | OUTPATIENT
Start: 2023-05-15 | End: 2024-01-16 | Stop reason: SDUPTHER

## 2023-06-21 NOTE — TELEPHONE ENCOUNTER
No care due was identified.  [facility] Embedded Care Due Messages. Reference number: [id number].   6/21/2023 12:11:56 AM CDT

## 2023-06-21 NOTE — TELEPHONE ENCOUNTER
Refill Routing Note   Medication(s) are not appropriate for processing by [facility] for the following reason(s):      Required vitals abnormal    [facility] action(s):  Defer None identified            Appointments  past 12m or future 3m with PCP    Date Provider   Last Visit   2/15/2023 Evangelina Stone MD   Next Visit   8/15/2023 Evangelina Stone MD   ED visits in past 90 days: 0        Note composed:3:46 AM 06/21/2023

## 2023-06-22 RX ORDER — LOSARTAN POTASSIUM 100 MG/1
TABLET ORAL
Qty: 90 TABLET | Refills: 3 | Status: SHIPPED | OUTPATIENT
Start: 2023-06-22 | End: 2024-03-19

## 2023-08-01 ENCOUNTER — PATIENT MESSAGE (OUTPATIENT)
Dept: ADMINISTRATIVE | Facility: HOSPITAL | Age: 63
End: 2023-08-01
Payer: MEDICARE

## 2023-08-15 ENCOUNTER — OFFICE VISIT (OUTPATIENT)
Dept: FAMILY MEDICINE | Facility: CLINIC | Age: 63
End: 2023-08-15
Payer: MEDICARE

## 2023-08-15 VITALS
WEIGHT: 173.75 LBS | DIASTOLIC BLOOD PRESSURE: 86 MMHG | SYSTOLIC BLOOD PRESSURE: 124 MMHG | HEIGHT: 70 IN | OXYGEN SATURATION: 98 % | HEART RATE: 96 BPM | BODY MASS INDEX: 24.87 KG/M2

## 2023-08-15 DIAGNOSIS — N52.9 ERECTILE DYSFUNCTION, UNSPECIFIED ERECTILE DYSFUNCTION TYPE: ICD-10-CM

## 2023-08-15 DIAGNOSIS — Z12.11 COLON CANCER SCREENING: ICD-10-CM

## 2023-08-15 DIAGNOSIS — G47.00 INSOMNIA, UNSPECIFIED TYPE: ICD-10-CM

## 2023-08-15 DIAGNOSIS — G20.A1 PARKINSON'S DISEASE: ICD-10-CM

## 2023-08-15 DIAGNOSIS — L98.9 SKIN LESION: ICD-10-CM

## 2023-08-15 DIAGNOSIS — I10 ESSENTIAL HYPERTENSION: Primary | ICD-10-CM

## 2023-08-15 PROCEDURE — 99214 PR OFFICE/OUTPT VISIT, EST, LEVL IV, 30-39 MIN: ICD-10-PCS | Mod: S$PBB,,, | Performed by: INTERNAL MEDICINE

## 2023-08-15 PROCEDURE — 99999 PR PBB SHADOW E&M-EST. PATIENT-LVL V: ICD-10-PCS | Mod: PBBFAC,,, | Performed by: INTERNAL MEDICINE

## 2023-08-15 PROCEDURE — 99215 OFFICE O/P EST HI 40 MIN: CPT | Mod: PBBFAC,PO | Performed by: INTERNAL MEDICINE

## 2023-08-15 PROCEDURE — 99999 PR PBB SHADOW E&M-EST. PATIENT-LVL V: CPT | Mod: PBBFAC,,, | Performed by: INTERNAL MEDICINE

## 2023-08-15 PROCEDURE — 99214 OFFICE O/P EST MOD 30 MIN: CPT | Mod: S$PBB,,, | Performed by: INTERNAL MEDICINE

## 2023-08-15 RX ORDER — SILDENAFIL 25 MG/1
25 TABLET, FILM COATED ORAL DAILY PRN
Qty: 10 TABLET | Refills: 1 | Status: SHIPPED | OUTPATIENT
Start: 2023-08-15 | End: 2023-09-11

## 2023-08-15 NOTE — PROGRESS NOTES
Subjective:       Patient ID: Claude E. Escude is a 63 y.o. male.    Chief Complaint: Hypertension      HPI  Claude E. Escude is a 63 y.o. male with  hypertension and recently diagnosed with Parkinson's disease who presents today for Hypertension    Recently saw Neurology and Sinemet was increased and continues on Selegiline.  Does not feel much change.  Tremors are not as prominent but tends to trip, loose balance, trouble writing with his dominant hand, and pressured speech.  Was trying to hold off therapy but now feels may be needed.    Feels frustrated because he is not able to do things he used to enjoy like playing golf or pickle ball.  Had a fall were mowing the lawn, and needs to be careful with turns when walking or standing.  Has been learning to write with his left hand.  No trouble swallowing.    His has left shoulder pain when he sleeps with his arm extended.  The pain is mild and does not take any medications.  When able to move it feels better.  Has trouble sleeping, sometimes his mind is racing.  Has not tried any medications.    Also expresses concerns with intimacy due to ED and control of his movements.  Tried as he Viagras 100mg from his friend, which worked for him without side effects.    Has a skin lesion behind his ear that has been growing and would like to see a dermatologist.    Blood pressure has been stable and compliant with medications.      Health Maintenance:  Health Maintenance   Topic Date Due    Hepatitis C Screening  Never done    Colorectal Cancer Screening  08/30/2023    Shingles Vaccine (1 of 2) 02/15/2024 (Originally 2/4/2008)    Lipid Panel  02/17/2028    TETANUS VACCINE  08/17/2032       Review of Systems   Constitutional: Negative.  Negative for fever and unexpected weight change.   HENT: Negative.     Respiratory: Negative.     Cardiovascular: Negative.    Gastrointestinal: Negative.    Genitourinary:  Positive for erectile dysfunction. Negative for difficulty urinating.    Musculoskeletal:  Positive for arthralgias (shoulder).   Integumentary:  Positive for mole/lesion (behind left ear). Negative.   Neurological:  Positive for tremors, speech difficulty, coordination difficulties and coordination difficulties.   Psychiatric/Behavioral:  Positive for sleep disturbance.       Past Medical History:   Diagnosis Date    HTN (hypertension)        No past surgical history on file.    Family History   Problem Relation Age of Onset    Heart disease Mother     Hypertension Mother     Diabetes Mellitus Mother     Arthritis Mother     Prostate cancer Father 48    No Known Problems Sister     Bone cancer Brother 43    Heart attack Brother 46        smoked    Heart disease Maternal Grandmother     Heart disease Maternal Grandfather     Heart disease Paternal Grandmother     Heart disease Paternal Grandfather        Social History     Socioeconomic History    Marital status:    Tobacco Use    Smoking status: Never    Smokeless tobacco: Never       Current Outpatient Medications   Medication Sig Dispense Refill    carbidopa-levodopa  mg (SINEMET)  mg per tablet TAKE 1 TABLET BY MOUTH THREE TIMES A  tablet 3    losartan (COZAAR) 100 MG tablet TAKE 1 TABLET BY MOUTH EVERY DAY 90 tablet 3    selegiline HCl (ELDEPRYL) 5 mg tablet Take 1 tablet (5 mg total) by mouth 2 (two) times daily with meals. 60 tablet 11     No current facility-administered medications for this visit.       Review of patient's allergies indicates:  No Known Allergies      Objective:       Last 3 sets of Vitals    Vitals - 1 value per visit 4/5/2023 8/15/2023 8/15/2023   SYSTOLIC 148 - 124   DIASTOLIC 85 - 86   Pulse 77 - 96   Temp - - -   Resp - - -   SPO2 - - 98   Weight (lb) 176.59 - 173.72   Weight (kg) 80.1 - 78.8   Height 70 - 70   BMI (Calculated) 25.3 - 24.9   VISIT REPORT [id number]   [id number]; 17NONCRENCREPNotFromCR   [id number]; 17NONCRENCREPNotFromCR   [id number];   Pain  Score  - 0 -   Physical Exam  Constitutional:       General: He is not in acute distress.  HENT:      Head: Normocephalic.      Right Ear: Tympanic membrane, ear canal and external ear normal.      Left Ear: Tympanic membrane, ear canal and external ear normal.      Nose: Nose normal.      Mouth/Throat:      Mouth: Mucous membranes are moist.   Eyes:      General: No scleral icterus.     Extraocular Movements: Extraocular movements intact.      Conjunctiva/sclera: Conjunctivae normal.   Neck:      Vascular: No carotid bruit.      Comments: No goiter.  Cardiovascular:      Rate and Rhythm: Normal rate and regular rhythm.      Pulses: Normal pulses.      Heart sounds: Normal heart sounds.   Pulmonary:      Effort: Pulmonary effort is normal.      Breath sounds: Normal breath sounds.   Abdominal:      General: Bowel sounds are normal. There is no distension.      Palpations: Abdomen is soft. There is no mass.      Tenderness: There is no abdominal tenderness.   Musculoskeletal:         General: No swelling.   Lymphadenopathy:      Cervical: No cervical adenopathy.   Skin:     General: Skin is warm and dry.      Comments: 3 mm round and smooth raced lesion behind left ear.  No erythema unclear vascular a color not even.   Neurological:      General: No focal deficit present.      Mental Status: He is alert and oriented to person, place, and time.   Psychiatric:         Mood and Affect: Mood normal.         Behavior: Behavior normal.           CBC:  Recent Labs   Lab 08/24/22  0834 02/01/23  1110 02/17/23  0825   WBC 4.4 4.84 4.84   RBC 4.52 4.70 4.79   Hemoglobin 14.1 14.7 15.0   Hematocrit 42.3 43.3 44.0   Platelets 193 178 189   MCV 93.6 92 92   MCH 31.2 31.3 H 31.3 H   MCHC 33.3 33.9 34.1     CMP:  Recent Labs   Lab 03/15/21  0713 08/24/22  0834 02/17/23  0826   Glucose 115 H 104 H 110   Calcium 9.1 9.3 9.3   Albumin 4.5 4.5 4.2   Total Protein 6.4 6.4 6.7   Sodium 140 143 140   Potassium 3.9 4.1 4.2   CO2 27 29 26    Chloride 105 107 106   BUN 14 16 11   Creatinine 0.93 0.85 1.0   Alkaline Phosphatase  --   --  56   ALT 15 13 <5 L   AST 14 13 15   Total Bilirubin 0.9 0.8 1.0     URINALYSIS:       LIPIDS:  Recent Labs   Lab 03/15/21  0713 08/11/21  1428 08/24/22  0834 02/17/23  0825 02/17/23  0826   TSH 1.90 0.912 2.40 1.215  --    HDL 58  --  51  --  49   Cholesterol 152  --  159  --  150   Triglycerides 76  --  69  --  69   LDL Cholesterol 78  --  92  --  87.2   HDL/Cholesterol Ratio 2.6  --  3.1  --  32.7   Non-HDL Cholesterol  --   --   --   --  101   Non HDL Chol. (LDL+VLDL) 94  --  108  --   --    Total Cholesterol/HDL Ratio  --   --   --   --  3.1     TSH:  Recent Labs   Lab 08/11/21  1428 08/24/22  0834 02/17/23  0825   TSH 0.912 2.40 1.215       A1C:  Recent Labs   Lab 03/24/21  1523 08/24/22  0834 02/17/23  0825   Hemoglobin A1C 5.3 5.4 5.4       Imaging:  Fl Modified Barium Swallow Speech  Narrative: EXAMINATION:  FL MODIFIED BARIUM SWALLOW SPEECH STUDY    CLINICAL HISTORY:  Dysphagia, oropharyngeal phase    TECHNIQUE:  Conjunction with speech pathology, the patient ingested barium of varying consistencies to include thin, pudding, barium coated peaches, and cracker.    Fluoroscopy time: 2.8 minutes.    COMPARISON:  None.    FINDINGS:  Mild weakened pharyngeal constriction across tested consistencies with mild pharyngeal residue.  Two instances of purposeful oropharyngeal regurgitation and re-swallow with barium coated cracker.  No convincing laryngeal penetration or tracheal aspiration.  Some sticking of the barium tablet within the oral cavity on initial swallow attempt.  The pill was swallowed without difficulty on subsequent swallow attempt.  Impression: As above.    Please see separate speech pathology report further detail.    Electronically signed by: Julien Ackerman  Date:    01/26/2022  Time:    11:40      Assessment:       1. Essential hypertension    2. Parkinson's disease    3. Insomnia, unspecified type     4. Erectile dysfunction, unspecified erectile dysfunction type    5. Skin lesion    6. Colon cancer screening            Plan:       1. Essential hypertension   - Controlled, same treatment.      2. Parkinson's disease  -     Ambulatory referral/consult to Physical/Occupational Therapy; Future; Expected date: 08/22/2023  -     Ambulatory referral/consult to Speech Therapy; Future; Expected date: 08/15/2023  - follow with Neurology.    3. Insomnia, unspecified type   - Try melatonin 5-10 mg nightly as needed.  Could try Benadryl 25 mg as needed for sleep.    4. Erectile dysfunction, unspecified erectile dysfunction type  -     Ambulatory referral/consult to Urology; Future; Expected date: 08/15/2023  -     TESTOSTERONE; Future; Expected date: 08/15/2023  -     sildenafiL (VIAGRA) 25 MG tablet; Take 1 tablet (25 mg total) by mouth daily as needed for Erectile Dysfunction.  Dispense: 10 tablet; Refill: 1    5. Skin lesion  -     Ambulatory referral/consult to Dermatology; Future; Expected date: 08/15/2023    6. Colon cancer screening   - cannot enter colonoscopy order until August 30 a but planning to schedule it.       Consider Tylenol or Aleve for if shoulder pain.  Stretching exercises included in summary.  Health Maintenance Due   Topic Date Due    Hepatitis C Screening  Never done    Colorectal Cancer Screening  08/30/2023            Return to clinic in 6 months or earlier if needed.    Evangelina Stone MD  [facility]  Disclaimer:  This note has been generated using voice-recognition software. There may be grammatical or spelling errors that have been missed during proof-reading

## 2023-08-16 ENCOUNTER — LAB VISIT (OUTPATIENT)
Dept: LAB | Facility: HOSPITAL | Age: 63
End: 2023-08-16
Attending: INTERNAL MEDICINE
Payer: MEDICARE

## 2023-08-16 ENCOUNTER — PATIENT OUTREACH (OUTPATIENT)
Dept: ADMINISTRATIVE | Facility: HOSPITAL | Age: 63
End: 2023-08-16
Payer: MEDICARE

## 2023-08-16 DIAGNOSIS — N52.9 ERECTILE DYSFUNCTION, UNSPECIFIED ERECTILE DYSFUNCTION TYPE: ICD-10-CM

## 2023-08-16 LAB — TESTOST SERPL-MCNC: 556 NG/DL (ref 304–1227)

## 2023-08-16 PROCEDURE — 36415 COLL VENOUS BLD VENIPUNCTURE: CPT | Performed by: INTERNAL MEDICINE

## 2023-08-16 PROCEDURE — 84403 ASSAY OF TOTAL TESTOSTERONE: CPT | Performed by: INTERNAL MEDICINE

## 2023-08-20 ENCOUNTER — PATIENT MESSAGE (OUTPATIENT)
Dept: FAMILY MEDICINE | Facility: CLINIC | Age: 63
End: 2023-08-20
Payer: MEDICARE

## 2023-08-23 PROBLEM — Z74.09 IMPAIRED MOBILITY AND ADLS: Status: RESOLVED | Noted: 2022-11-23 | Resolved: 2023-08-23

## 2023-08-23 PROBLEM — Z78.9 IMPAIRED MOBILITY AND ADLS: Status: RESOLVED | Noted: 2022-11-23 | Resolved: 2023-08-23

## 2023-08-23 PROBLEM — R29.3 POSTURAL INSTABILITY: Status: ACTIVE | Noted: 2023-08-23

## 2023-08-23 PROBLEM — R26.89 BALANCE PROBLEM: Status: RESOLVED | Noted: 2022-11-23 | Resolved: 2023-08-23

## 2023-08-29 ENCOUNTER — TELEPHONE (OUTPATIENT)
Dept: FAMILY MEDICINE | Facility: CLINIC | Age: 63
End: 2023-08-29
Payer: MEDICARE

## 2023-08-29 DIAGNOSIS — G20.A1 PARKINSON'S DISEASE: Primary | ICD-10-CM

## 2023-08-29 DIAGNOSIS — G20.C PARKINSONISM, UNSPECIFIED PARKINSONISM TYPE: Primary | ICD-10-CM

## 2023-08-29 NOTE — TELEPHONE ENCOUNTER
----- Message from TYRELL Rodriguez sent at 8/29/2023  1:55 PM CDT -----  Regarding: Patient Order  Hi. Dr. Stone!    I am an OT at the [facility]. Mr. Claude came in for a PT eval last week. He has been to our clinic several times. After discussion, he now feels ready to attempt [facility]. He no longer has a copay, and he is retired. Could you possibly place an OT order for [facility] BIG so that the PT and I could co-treat when doing the program? We are the only 2 certified here and its helpful when we split so we can tackle different things. Let me know if you have any questions!     Thanks so much,     Yovana OT

## 2023-08-31 PROBLEM — R47.1 DYSARTHRIA: Status: ACTIVE | Noted: 2023-08-31

## 2023-09-11 ENCOUNTER — OFFICE VISIT (OUTPATIENT)
Dept: UROLOGY | Facility: CLINIC | Age: 63
End: 2023-09-11
Payer: MEDICARE

## 2023-09-11 VITALS
HEIGHT: 70 IN | WEIGHT: 174.25 LBS | SYSTOLIC BLOOD PRESSURE: 160 MMHG | BODY MASS INDEX: 24.95 KG/M2 | HEART RATE: 72 BPM | DIASTOLIC BLOOD PRESSURE: 88 MMHG

## 2023-09-11 DIAGNOSIS — N52.9 ERECTILE DYSFUNCTION, UNSPECIFIED ERECTILE DYSFUNCTION TYPE: ICD-10-CM

## 2023-09-11 PROCEDURE — 99999 PR PBB SHADOW E&M-EST. PATIENT-LVL III: ICD-10-PCS | Mod: PBBFAC,,, | Performed by: UROLOGY

## 2023-09-11 PROCEDURE — 99204 OFFICE O/P NEW MOD 45 MIN: CPT | Mod: S$PBB,,, | Performed by: UROLOGY

## 2023-09-11 PROCEDURE — 99204 PR OFFICE/OUTPT VISIT, NEW, LEVL IV, 45-59 MIN: ICD-10-PCS | Mod: S$PBB,,, | Performed by: UROLOGY

## 2023-09-11 PROCEDURE — 99999 PR PBB SHADOW E&M-EST. PATIENT-LVL III: CPT | Mod: PBBFAC,,, | Performed by: UROLOGY

## 2023-09-11 PROCEDURE — 99213 OFFICE O/P EST LOW 20 MIN: CPT | Mod: PBBFAC,PO | Performed by: UROLOGY

## 2023-09-11 RX ORDER — SILDENAFIL 100 MG/1
100 TABLET, FILM COATED ORAL DAILY PRN
Qty: 30 TABLET | Refills: 11 | Status: SHIPPED | OUTPATIENT
Start: 2023-09-11 | End: 2024-09-10

## 2023-09-11 NOTE — PROGRESS NOTES
[facility] - Urology   Clinic Note    SUBJECTIVE:     Chief Complaint   Patient presents with    Erectile Dysfunction       Referral from: Evangelina Stone MD.    History of Present Illness:  Claude E. Escude is a 63 y.o. male who presents to clinic for erectile dysfunction.    Patient here with complaints of erectile dysfunction.  He endorses difficulty getting and maintaining erections.  He is not able to reliably achieve a strong enough erection for intercourse. Has tried 50mg of viagra before with good response.    LIVAN: 16/25    Previous ED treatment: yes, viagra.    Patient endorses no additional complaints at this time.    Past Medical History:   Diagnosis Date    HTN (hypertension)        History reviewed. No pertinent surgical history.    Family History   Problem Relation Age of Onset    Heart disease Mother     Hypertension Mother     Diabetes Mellitus Mother     Arthritis Mother     Prostate cancer Father 48    No Known Problems Sister     Bone cancer Brother 43    Heart attack Brother 46        smoked    Heart disease Maternal Grandmother     Heart disease Maternal Grandfather     Heart disease Paternal Grandmother     Heart disease Paternal Grandfather        Social History     Tobacco Use    Smoking status: Never    Smokeless tobacco: Never       Current Outpatient Medications on File Prior to Visit   Medication Sig Dispense Refill    carbidopa-levodopa  mg (SINEMET)  mg per tablet TAKE 1 TABLET BY MOUTH THREE TIMES A  tablet 3    losartan (COZAAR) 100 MG tablet TAKE 1 TABLET BY MOUTH EVERY DAY 90 tablet 3    selegiline HCl (ELDEPRYL) 5 mg tablet Take 1 tablet (5 mg total) by mouth 2 (two) times daily with meals. 60 tablet 11    [DISCONTINUED] sildenafiL (VIAGRA) 25 MG tablet Take 1 tablet (25 mg total) by mouth daily as needed for Erectile Dysfunction. (Patient not taking: Reported on 9/11/2023) 10 tablet 1     No current facility-administered medications on file prior to visit.  "      Review of patient's allergies indicates:  No Known Allergies    Review of Systems:  A review of 10+ systems was conducted with pertinent positive and negative findings documented in HPI with all other systems reviewed and negative.    OBJECTIVE:     Estimated body mass index is 25.01 kg/m² as calculated from the following:    Height as of this encounter: 5' 10" (1.778 m).    Weight as of this encounter: 79.1 kg (174 lb 4.4 oz).    Vital Signs (Most Recent)  Vitals:    09/11/23 0927   BP: (!) 160/88   Pulse: 72       Physical Exam:  GENERAL: patient sitting comfortably  HEENT: normocephalic  NECK: supple, no JVD  PULM: normal chest rise, no increased WOB  HEART: non-diaphoretic  ABDO: soft, nondistended, nontender  BACK: no CVA tenderness bilaterally  SKIN: warm, dry, well perfused  EXT: no bruising or edema  NEURO: grossly normal with no focal deficits  PSYCH: appropriate mood and affect    Genitourinary Exam:  deferred    Lab Results   Component Value Date    BUN 11 02/17/2023    CREATININE 1.0 02/17/2023    WBC 4.84 02/17/2023    HGB 15.0 02/17/2023    HCT 44.0 02/17/2023     02/17/2023    AST 15 02/17/2023    ALT <5 (L) 02/17/2023    ALKPHOS 56 02/17/2023    ALBUMIN 4.2 02/17/2023    HGBA1C 5.4 02/17/2023        Imaging:   I have personally reviewed all relevant imaging studies.    No results found for this or any previous visit (from the past 2160 hour(s)).  No results found for this or any previous visit (from the past 2160 hour(s)).  Fl Modified Barium Swallow Speech  Narrative: EXAMINATION:  FL MODIFIED BARIUM SWALLOW SPEECH STUDY    CLINICAL HISTORY:  Dysphagia, oropharyngeal phase    TECHNIQUE:  Conjunction with speech pathology, the patient ingested barium of varying consistencies to include thin, pudding, barium coated peaches, and cracker.    Fluoroscopy time: 2.8 minutes.    COMPARISON:  None.    FINDINGS:  Mild weakened pharyngeal constriction across tested consistencies with mild " pharyngeal residue.  Two instances of purposeful oropharyngeal regurgitation and re-swallow with barium coated cracker.  No convincing laryngeal penetration or tracheal aspiration.  Some sticking of the barium tablet within the oral cavity on initial swallow attempt.  The pill was swallowed without difficulty on subsequent swallow attempt.  Impression: As above.    Please see separate speech pathology report further detail.    Electronically signed by: Julien Ackerman  Date:    01/26/2022  Time:    11:40       PSA:  Lab Results   Component Value Date    PSA 0.86 02/17/2023    PSADIAG 0.5 03/15/2021       Testosterone:  Lab Results   Component Value Date    TOTALTESTOST 556 08/16/2023        ASSESSMENT     1. Erectile dysfunction, unspecified erectile dysfunction type        PLAN:     Erectile Dysfunction    - We discussed the etiology and management of ED, including organic and psychogenic causes. First line therapy involves treatment with PDE-5 inhibitors.    - We discussed the risks and benefits of treatment with PDE5i's. Treatment with MONIE was also briefly discussed.     - Next line therapies would include intraurethral suppository (125-1000 ug), then intracavernosal injections (1 ml trimix papaverine 30 mg, phentolamine 1 mg, prostaglandin E1 10 ug). Ultimately, a penile prosthesis would be the final option.     - We will initiate therapy with a PDE5i with Viagra 100mg PRN.    Connor Cohen MD  Urology  [facility]    Disclaimer: This note has been generated using voice-recognition software. There may be typographical errors that have been missed during proof-reading.

## 2023-09-18 PROBLEM — R29.3 POSTURAL INSTABILITY: Status: RESOLVED | Noted: 2023-08-23 | Resolved: 2023-09-18

## 2023-09-18 PROBLEM — R47.1 DYSARTHRIA: Status: RESOLVED | Noted: 2023-08-31 | Resolved: 2023-09-18

## 2023-09-29 PROBLEM — R47.1 DYSARTHRIA: Status: ACTIVE | Noted: 2023-09-29

## 2023-10-17 PROBLEM — Z74.09 IMPAIRED MOBILITY AND ACTIVITIES OF DAILY LIVING: Status: ACTIVE | Noted: 2023-10-17

## 2023-10-17 PROBLEM — Z78.9 IMPAIRED MOBILITY AND ACTIVITIES OF DAILY LIVING: Status: ACTIVE | Noted: 2023-10-17

## 2023-10-27 ENCOUNTER — TELEPHONE (OUTPATIENT)
Dept: NEUROLOGY | Facility: CLINIC | Age: 63
End: 2023-10-27
Payer: MEDICARE

## 2023-10-27 NOTE — TELEPHONE ENCOUNTER
----- Message from Ness William sent at 10/27/2023  3:06 PM CDT -----  Type:  Sooner Appointment Request    Caller is requesting a sooner appointment.  Caller declined first available appointment listed below.  Caller will not accept being placed on the waitlist and is requesting a message be sent to doctor.  Name of Caller:pt  When is the first available appointment?n/a  Symptoms:Follow up visit for Parkinson's  Would the patient rather a call back or a response via [facility]? call  Best Call Back Number:[phone]  Additional Information:

## 2023-11-14 ENCOUNTER — PATIENT MESSAGE (OUTPATIENT)
Dept: NEUROLOGY | Facility: CLINIC | Age: 63
End: 2023-11-14
Payer: MEDICARE

## 2024-01-16 ENCOUNTER — OFFICE VISIT (OUTPATIENT)
Dept: NEUROLOGY | Facility: CLINIC | Age: 64
End: 2024-01-16
Payer: MEDICARE

## 2024-01-16 ENCOUNTER — TELEPHONE (OUTPATIENT)
Dept: NEUROLOGY | Facility: CLINIC | Age: 64
End: 2024-01-16

## 2024-01-16 ENCOUNTER — PATIENT MESSAGE (OUTPATIENT)
Dept: NEUROLOGY | Facility: CLINIC | Age: 64
End: 2024-01-16

## 2024-01-16 DIAGNOSIS — G20.C PARKINSONISM, UNSPECIFIED PARKINSONISM TYPE: Primary | ICD-10-CM

## 2024-01-16 DIAGNOSIS — R47.89 RAPID RATE OF SPEECH: ICD-10-CM

## 2024-01-16 DIAGNOSIS — R26.81 GAIT INSTABILITY: ICD-10-CM

## 2024-01-16 DIAGNOSIS — R26.81 UNSTEADY GAIT: ICD-10-CM

## 2024-01-16 DIAGNOSIS — Z71.89 COUNSELING REGARDING GOALS OF CARE: ICD-10-CM

## 2024-01-16 PROCEDURE — 99214 OFFICE O/P EST MOD 30 MIN: CPT | Mod: 95,,, | Performed by: PHYSICIAN ASSISTANT

## 2024-01-16 RX ORDER — SELEGILINE HYDROCHLORIDE 5 MG/1
5 TABLET ORAL 2 TIMES DAILY WITH MEALS
Qty: 180 TABLET | Refills: 3 | Status: SHIPPED | OUTPATIENT
Start: 2024-01-16 | End: 2025-01-15

## 2024-01-16 RX ORDER — CARBIDOPA AND LEVODOPA 25; 100 MG/1; MG/1
2 TABLET ORAL 3 TIMES DAILY
Qty: 540 TABLET | Refills: 3 | Status: SHIPPED | OUTPATIENT
Start: 2024-01-16

## 2024-01-16 NOTE — Clinical Note
Hi! This patient was denied disability through [employer]. Has questions about the denial, how to appeal etc. Could you contact him? Thanks!

## 2024-01-16 NOTE — PROGRESS NOTES
Neurology Telemedicine Note     Name: Claude E. Escude  MRN: [id number]   CSN: [id number]      Date: 01/16/2024    The patient location is: Home  The chief complaint leading to consultation is: PD   Visit type: Virtual visit with synchronous audio and video    TOTAL TIME SPENT: 32 min     Each patient to whom I provide medical services by telemedicine is:  (1) informed of the relationship between the physician and patient and the respective role of any other health care provider with respect to management of the patient; and (2) notified that they may decline to receive medical services by telemedicine and may withdraw from such care at any time.         Interval History:  - cd/ld 1.5 tabs TID, selegiline 5 mg BID   - not much improvement   - went to speech therapy, still speaking too fast   - went to PT -- only finds it minimally helpful   - falling once a week whenever he turns or backs up   - R foot FOG   - finished BIG therapy in Nov   - still doing these exercises at home   - micrographia is still an issue   - crying more when watching TV -- thinks that this has increased in the last year  - not necessarily bothersome to him  - less patient than previously   - golf game is still suffering    - buttoning buttons is an issue, putting shoes on   - once every blue moon gets vertigo with nausea  - tends to be positional  - denied disability       From April 2023  - one fall, fell in the grass turning   - added selegiline last visit   - not much difference   - cd/ld 1 tab TID   - speech is the same, this is the most bothersome thing to him, balance is second most bothersome    - retired 3 weeks ago, planning on doing more exercise   - not doing exercises from therapy  - mood is good, playing golf weekly  - some trouble eating french fries   - no constipation   - some ED, in the last year, has not discussed with his PCP but not a big deal to him   - some sticking of his R foot     Nonmotor/Premotor ROS: as per HPI, and all  other systems are negative    Past Medical History: The patient  has a past medical history of HTN (hypertension).    Social History: The patient  reports that he has never smoked. He has never used smokeless tobacco.    Family History: Their family history includes Arthritis in his mother; Bone cancer (age of onset: 43) in his brother; Diabetes Mellitus in his mother; Heart attack (age of onset: 46) in his brother; Heart disease in his maternal grandfather, maternal grandmother, mother, paternal grandfather, and paternal grandmother; Hypertension in his mother; No Known Problems in his sister; Prostate cancer (age of onset: 48) in his father.    Allergies: Patient has no known allergies.     Meds:   Current Outpatient Medications on File Prior to Visit   Medication Sig Dispense Refill    carbidopa-levodopa  mg (SINEMET)  mg per tablet TAKE 1 TABLET BY MOUTH THREE TIMES A  tablet 3    losartan (COZAAR) 100 MG tablet TAKE 1 TABLET BY MOUTH EVERY DAY 90 tablet 3    selegiline HCl (ELDEPRYL) 5 mg tablet Take 1 tablet (5 mg total) by mouth 2 (two) times daily with meals. 60 tablet 11    sildenafiL (VIAGRA) 100 MG tablet Take 1 tablet (100 mg total) by mouth daily as needed for Erectile Dysfunction. 30 tablet 11     No current facility-administered medications on file prior to visit.       Exam:  Vital Signs deferred with home visit    Constitutional  Well-developed, well-nourished, appears stated age   Eyes  No scleral icterus   ENT  Moist oral mucosa   Cardiovascular  No lower extremity edema    Respiratory  No labored breathing    Skin  No rashes   Hematologic  No bruising   Psychiatric  Normal mood and affect   Other  GI/ deferred    Neurological     * Mental status  Alert and oriented to person, place, time, and situation; no dysarthria; no aphasia; normal recent and remote memory; follows commands   * Cranial nerves     - CN II  Pupils midposition and symmetric   - CN III, IV, VI  Extraocular  movements full, no nystagmus visualized   - CN V  Unable to test   - CN VII  Face strong and symmetric bilaterally    - CN VIII  Hearing grossly intact with conversation    - CN IX, X  Palate raises midline and symmetric    - CN XI  Strong shoulder shrug B    - CN XII  Tongue appears midline    * Motor  Normal bulk by appearance, no drift    * Sensory  Not tested objectively, no changes described by the patient   * Deep tendon reflexes  Not tested   * Coord/Movemt/Gait No hypophonic speech.  No facial masking.  No B bradykinesia.  No tremor with rest, posture, kinesis, or intention.   No chorea, athetosis, dystonia, myoclonus, or tics.   No motor impersistence.  Gait is deferred for safety.       Medical Record Review:  - Lab Results:  No visits with results within 3 Month(s) from this visit.   Latest known visit with results is:   Lab Visit on 08/16/2023   Component Date Value Ref Range Status    Testosterone, Total 08/16/2023 556  304 - 1227 ng/dL Final         Assessment/Plan:  ASSESSMENT/PLAN:  1. Parkinson's   - likely typical PD, untreated   - micrographia, hypophonia, L > R cogwheel rigidity and L bradykinesia  - resting tremor of L leg only on distraction   - some early dysphagia and falls, may be bradykinetic swallowing mechanism and untreated PD leading to gait instability   - no atypical features on exam, brain MRI unremarkable, no midbrain atrophy, EOMs intact  - continue selegiline 5 mg BID, push cd/ld to 2 tabs TID   - discussed PT/BIG program        2. Festination of speech  - did not find that it improved with speech therapy   - adding back in speech therapy         Denied for disability through . This patient's condition is debilitating, affecting his ability to perform his job. Will have Rosana Jade reach out to patient for assistance.     F/u with me in 3 months, [facility] in 6 months       Orders Placed This Encounter    Ambulatory referral/consult to Speech Therapy    carbidopa-levodopa  mg  (SINEMET)  mg per tablet    selegiline HCl (ELDEPRYL) 5 mg tablet           Collaborating Physician, Dr. Trujillo, was available during today's encounter. Any change to plan along with cosign to appear in the EMR.       I spent 32 minutes with the patient, reviewing past encounters, labs and imaging.          Amirah Smith PA-C   [facility]  Department of Neurology  Movement Disorders

## 2024-01-16 NOTE — TELEPHONE ENCOUNTER
----- Message from Dayami Xavier sent at 1/16/2024 10:50 AM CST -----  Regarding: call back  Contact: [phone]  Pt calling in requesting call back regarding virtual apt please call to discuss Further

## 2024-01-16 NOTE — TELEPHONE ENCOUNTER
----- Message from Amirah Smith PA-C sent at 1/16/2024  9:58 AM CST -----  3 month f/u in person with me

## 2024-01-23 ENCOUNTER — OFFICE VISIT (OUTPATIENT)
Dept: PRIMARY CARE CLINIC | Facility: CLINIC | Age: 64
End: 2024-01-23
Payer: MEDICARE

## 2024-01-23 VITALS
BODY MASS INDEX: 25.36 KG/M2 | SYSTOLIC BLOOD PRESSURE: 144 MMHG | DIASTOLIC BLOOD PRESSURE: 82 MMHG | OXYGEN SATURATION: 100 % | HEART RATE: 98 BPM | WEIGHT: 177.13 LBS | HEIGHT: 70 IN

## 2024-01-23 DIAGNOSIS — D70.9 EOSINOPENIA: ICD-10-CM

## 2024-01-23 DIAGNOSIS — R79.9 ABNORMAL BLOOD CHEMISTRY: ICD-10-CM

## 2024-01-23 DIAGNOSIS — G20.C PARKINSONISM, UNSPECIFIED PARKINSONISM TYPE: ICD-10-CM

## 2024-01-23 DIAGNOSIS — I10 ESSENTIAL HYPERTENSION: Primary | ICD-10-CM

## 2024-01-23 DIAGNOSIS — Z00.00 HEALTHCARE MAINTENANCE: ICD-10-CM

## 2024-01-23 PROCEDURE — 99215 OFFICE O/P EST HI 40 MIN: CPT | Mod: S$PBB,,, | Performed by: INTERNAL MEDICINE

## 2024-01-23 PROCEDURE — 99213 OFFICE O/P EST LOW 20 MIN: CPT | Mod: PBBFAC,PN | Performed by: INTERNAL MEDICINE

## 2024-01-23 PROCEDURE — 99999 PR PBB SHADOW E&M-EST. PATIENT-LVL III: CPT | Mod: PBBFAC,,, | Performed by: INTERNAL MEDICINE

## 2024-01-23 RX ORDER — BLOOD-GLUCOSE METER
1 KIT MISCELLANEOUS DAILY
Qty: 1 EACH | Refills: 5 | Status: SHIPPED | OUTPATIENT
Start: 2024-01-23

## 2024-01-23 NOTE — PROGRESS NOTES
Subjective:       Patient ID: Claude E. Escude is a 63 y.o. male.    Chief Complaint: Hypertension      HPI  Claude E. Escude is a 63 y.o. male with hypertension, Parkinson's disease who presents today for Hypertension    Reports has received physical and speech therapy but continues to have fall and pressured speech.  Tripped while walking and sometimes with turns as the movement of 1 of his legs may be delayed sometimes.  Speech is fast and has trouble communicating as sometimes reports can not understand and has to repeat.  Has tried to slow down when walking and talking as i instructed on Big program.  He recently was seen by Neurology, his medications were increased, and speech therapy but waiting on appointment.    Has been eating okay with no trouble swallowing.  Sleeps okay, not as many hours as he would like, with occasional weird dreams.  Weight has been stable with no GI or  complaints.  Stays active playing golf.  Gets frustrated and sometimes irritable when noted able to do things as quick as he will like.  Denies and feels he is coping with situation.  With the amount of medications he needs to take for his Parkinson does not want to take any extra medications that do not help, including Tylenol and supplements.    Blood pressure has been fluctuating.  Takes his blood pressure at the pharmacy [facility] and has been okay.  Noted his diastolic blood pressure has been consistently in the 80s with quadrant systolic in the 140s and 150s.  Unclear if white coat syndrome.  Weight has been stable.  Eats mostly at home. No headaches, chest pains, or lightheadedness.  Had mild episode of dizziness when turning his head but Neurology did not feel it was related to his Parkinson.    Has appointment with Dermatology to remove skin lesion behind his ear.    He has been seen by Hematology for decrease eosinophils and observing for now.  Possibly related to medications.    Has no toxic habits.  He is .   Retired.  In the process to having disability denial re-evaluated.    Colon cancer screening-Last on 08/30/2022 with fit kit.  PSA-last on 02/17/2023  APC-no HPOA or living well.  Briefly discuss recommendations having someone that will speak for him and advance directives in case of serious illness and unable to communicate.  Will provide [place] post and will review on next visit.    Health Maintenance:  Health Maintenance   Topic Date Due    Hepatitis C Screening  Never done    Colorectal Cancer Screening  08/30/2023    Shingles Vaccine (1 of 2) 02/15/2024 (Originally 2/4/2008)    PROSTATE-SPECIFIC ANTIGEN  02/17/2024    Lipid Panel  02/17/2028    TETANUS VACCINE  08/17/2032       Review of Systems   Constitutional: Negative.  Negative for fever and unexpected weight change.   HENT: Negative.     Respiratory: Negative.     Cardiovascular: Negative.    Gastrointestinal: Negative.    Genitourinary:  Negative for difficulty urinating.   Musculoskeletal:  Positive for arthralgias (Does not take medication).   Integumentary:  Positive for mole/lesion (behind left ear, has appointment with Dermatology). Negative.   Neurological:  Positive for tremors, speech difficulty and coordination difficulties.   Psychiatric/Behavioral:  Positive for sleep disturbance.       Past Medical History:   Diagnosis Date    HTN (hypertension)        History reviewed. No pertinent surgical history.    Family History   Problem Relation Age of Onset    Heart disease Mother     Hypertension Mother     Diabetes Mellitus Mother     Arthritis Mother     Prostate cancer Father 48    No Known Problems Sister     Bone cancer Brother 43    Heart attack Brother 46        smoked    Heart disease Maternal Grandmother     Heart disease Maternal Grandfather     Heart disease Paternal Grandmother     Heart disease Paternal Grandfather        Social History     Socioeconomic History    Marital status:    Tobacco Use    Smoking status: Never    Smokeless  tobacco: Never     Social Determinants of Health     Financial Resource Strain: Low Risk  (1/16/2024)    Overall Financial Resource Strain (CARDIA)     Difficulty of Paying Living Expenses: Not very hard   Food Insecurity: No Food Insecurity (1/16/2024)    Hunger Vital Sign     Worried About Running Out of Food in the Last Year: Never true     Ran Out of Food in the Last Year: Never true   Transportation Needs: No Transportation Needs (1/16/2024)    PRAPARE - Transportation     Lack of Transportation (Medical): No     Lack of Transportation (Non-Medical): No   Physical Activity: Sufficiently Active (1/16/2024)    Exercise Vital Sign     Days of Exercise per Week: 5 days     Minutes of Exercise per Session: 60 min   Stress: No Stress Concern Present (1/16/2024)    [employer] [place] [employer] - Occupational Stress Questionnaire     Feeling of Stress : Only a little   Social Connections: Unknown (1/16/2024)    Social Connection and Isolation Panel [NHANES]     Frequency of Communication with Friends and Family: Three times a week     Frequency of Social Gatherings with Friends and Family: Twice a week     Active Member of Clubs or Organizations: No     Attends Club or Organization Meetings: Never     Marital Status:    Housing Stability: Low Risk  (1/16/2024)    Housing Stability Vital Sign     Unable to Pay for Housing in the Last Year: No     Number of Places Lived in the Last Year: 1     Unstable Housing in the Last Year: No       Current Outpatient Medications   Medication Sig Dispense Refill    carbidopa-levodopa  mg (SINEMET)  mg per tablet Take 2 tablets by mouth 3 (three) times daily. 540 tablet 3    losartan (COZAAR) 100 MG tablet TAKE 1 TABLET BY MOUTH EVERY DAY 90 tablet 3    selegiline HCl (ELDEPRYL) 5 mg tablet Take 1 tablet (5 mg total) by mouth 2 (two) times daily with meals. 180 tablet 3    sildenafiL (VIAGRA) 100 MG tablet Take 1 tablet (100 mg total) by mouth daily as  "needed for Erectile Dysfunction. 30 tablet 11    blood pressure kit med and lrg Kit 1 each by Misc.(Non-Drug; Combo Route) route Daily. 1 each 5     No current facility-administered medications for this visit.       Review of patient's allergies indicates:  No Known Allergies      Objective:       Last 3 sets of Vitals        8/15/2023     1:27 PM 9/11/2023     9:27 AM 1/23/2024     1:04 PM   Vitals - 1 value per visit   SYSTOLIC 124 160 144   DIASTOLIC 86 88 82   Pulse 96 72 98   SPO2 98 %  100 %   Weight (lb) 173.72 174.27 177.14   Weight (kg) 78.8 79.05 80.35   Height 5' 10" (1.778 m) 5' 10" (1.778 m) 5' 10" (1.778 m)   BMI (Calculated) 24.9 25 25.4   Pain Score Zero Zero    Physical Exam  Constitutional:       General: He is not in acute distress.  HENT:      Head: Normocephalic.      Right Ear: Tympanic membrane, ear canal and external ear normal.      Left Ear: Tympanic membrane, ear canal and external ear normal.      Nose: Nose normal.      Mouth/Throat:      Mouth: Mucous membranes are moist.   Eyes:      General: No scleral icterus.     Extraocular Movements: Extraocular movements intact.      Conjunctiva/sclera: Conjunctivae normal.   Neck:      Vascular: No carotid bruit.      Comments: No goiter.  Cardiovascular:      Rate and Rhythm: Normal rate and regular rhythm.      Pulses: Normal pulses.      Heart sounds: Normal heart sounds.   Pulmonary:      Effort: Pulmonary effort is normal.      Breath sounds: Normal breath sounds.   Abdominal:      General: Bowel sounds are normal. There is no distension.      Palpations: Abdomen is soft. There is no mass.      Tenderness: There is no abdominal tenderness.   Musculoskeletal:         General: No swelling.   Lymphadenopathy:      Cervical: No cervical adenopathy.   Skin:     General: Skin is warm and dry.      Comments: 3 mm round and smooth raced lesion behind left ear.  No erythema unclear vascular a color not even.   Neurological:      General: No focal " deficit present.      Mental Status: He is alert and oriented to person, place, and time.      Comments: Fast speech with low volume.  No rigidity or tremor.   Psychiatric:         Mood and Affect: Mood normal.         Behavior: Behavior normal.           CBC:  Recent Labs   Lab 08/24/22  0834 02/01/23  1110 02/17/23  0825   WBC 4.4 4.84 4.84   RBC 4.52 4.70 4.79   Hemoglobin 14.1 14.7 15.0   Hematocrit 42.3 43.3 44.0   Platelets 193 178 189   MCV 93.6 92 92   MCH 31.2 31.3 H 31.3 H   MCHC 33.3 33.9 34.1     CMP:  Recent Labs   Lab 03/15/21  0713 08/24/22  0834 02/17/23  0826   Glucose 115 H 104 H 110   Calcium 9.1 9.3 9.3   Albumin 4.5 4.5 4.2   Total Protein 6.4 6.4 6.7   Sodium 140 143 140   Potassium 3.9 4.1 4.2   CO2 27 29 26   Chloride 105 107 106   BUN 14 16 11   Creatinine 0.93 0.85 1.0   Alkaline Phosphatase  --   --  56   ALT 15 13 <5 L   AST 14 13 15   Total Bilirubin 0.9 0.8 1.0     URINALYSIS:       LIPIDS:  Recent Labs   Lab 03/15/21  0713 08/11/21  1428 08/24/22  0834 02/17/23  0825 02/17/23  0826   TSH 1.90 0.912 2.40 1.215  --    HDL 58  --  51  --  49   Cholesterol 152  --  159  --  150   Triglycerides 76  --  69  --  69   LDL Cholesterol 78  --  92  --  87.2   HDL/Cholesterol Ratio 2.6  --  3.1  --  32.7   Non-HDL Cholesterol  --   --   --   --  101   Non HDL Chol. (LDL+VLDL) 94  --  108  --   --    Total Cholesterol/HDL Ratio  --   --   --   --  3.1     TSH:  Recent Labs   Lab 08/11/21  1428 08/24/22  0834 02/17/23  0825   TSH 0.912 2.40 1.215       A1C:  Recent Labs   Lab 03/24/21  1523 08/24/22  0834 02/17/23  0825   Hemoglobin A1C 5.3 5.4 5.4       Imaging:  Fl Modified Barium Swallow Speech  Narrative: EXAMINATION:  FL MODIFIED BARIUM SWALLOW SPEECH STUDY    CLINICAL HISTORY:  Dysphagia, oropharyngeal phase    TECHNIQUE:  Conjunction with speech pathology, the patient ingested barium of varying consistencies to include thin, pudding, barium coated peaches, and cracker.    Fluoroscopy time:  2.8 minutes.    COMPARISON:  None.    FINDINGS:  Mild weakened pharyngeal constriction across tested consistencies with mild pharyngeal residue.  Two instances of purposeful oropharyngeal regurgitation and re-swallow with barium coated cracker.  No convincing laryngeal penetration or tracheal aspiration.  Some sticking of the barium tablet within the oral cavity on initial swallow attempt.  The pill was swallowed without difficulty on subsequent swallow attempt.  Impression: As above.    Please see separate speech pathology report further detail.    Electronically signed by: Julien Ackerman  Date:    01/26/2022  Time:    11:40      Assessment:       1. Essential hypertension    2. Parkinsonism, unspecified Parkinsonism type    3. Eosinopenia    4. Abnormal blood chemistry    5. Healthcare maintenance            Plan:       1. Essential hypertension  Assessment & Plan:  Frequently mildly elevated.  Will consider adding HCTZ.    Blood pressure monitor will be ordered to monitor at home before increasing medication for blood pressure.    Orders:  -     Comprehensive Metabolic Panel; Future; Expected date: 01/23/2024  -     Lipid Panel; Future; Expected date: 01/23/2024  -     TSH; Future; Expected date: 01/23/2024    2. Parkinsonism, unspecified Parkinsonism type  Assessment & Plan:  Neurology following and recently increased medications.  Speech therapy has been consulted.    Stays active      3. Eosinopenia  Assessment & Plan:  Noted in recent labs evaluated by Hematology.    Possibly medication related.    Observe.    Orders:  -     CBC Auto Differential; Future; Expected date: 01/23/2024    4. Abnormal blood chemistry  -     Hemoglobin A1C; Future; Expected date: 01/23/2024    5. Healthcare maintenance  Assessment & Plan:  - Yearly labs- labs ordered today  - Colon cancer screening- due for fit kit vs colonoscopy  - ACP- no documents with HPOA or [place] post.  Information given to review on next visit.  - Vaccination-  encourage medications      Other orders  -     blood pressure kit med and lrg Kit; 1 each by Misc.(Non-Drug; Combo Route) route Daily.  Dispense: 1 each; Refill: 5       Health Maintenance Due   Topic Date Due    Hepatitis C Screening  Never done    RSV Vaccine (Age 60+ and Pregnant patients) (1 - 1-dose 60+ series) Never done    Colorectal Cancer Screening  08/30/2023    Influenza Vaccine (1) Never done    COVID-19 Vaccine (3 - 2023-24 season) 09/01/2023        I spent a total of 40 minutes on the day of the visit.  This includes face to face time and non-face to face time preparing to see the patient (eg, review of tests), obtaining and/or reviewing separately obtained history, documenting clinical information in the electronic or other health record, independently interpreting results and communicating results to the patient/family/caregiver, or care coordinator.   Return to clinic in 2 weeks for blood pressure check and 2 months for preventive/wellness visit    Evangelina Stone MD  [facility]  Disclaimer:  This note has been generated using voice-recognition software. There may be grammatical or spelling errors that have been missed during proof-reading

## 2024-01-24 NOTE — ASSESSMENT & PLAN NOTE
- Yearly labs- labs ordered today  - Colon cancer screening- due for fit kit vs colonoscopy  - ACP- no documents with HPOA or [place] post.  Information given to review on next visit.  - Vaccination- encourage medications

## 2024-01-24 NOTE — ASSESSMENT & PLAN NOTE
Frequently mildly elevated.  Will consider adding HCTZ.    Blood pressure monitor will be ordered to monitor at home before increasing medication for blood pressure.

## 2024-01-24 NOTE — ASSESSMENT & PLAN NOTE
Neurology following and recently increased medications.  Speech therapy has been consulted.    Stays active

## 2024-01-25 ENCOUNTER — TELEPHONE (OUTPATIENT)
Dept: RESEARCH | Facility: HOSPITAL | Age: 64
End: 2024-01-25
Payer: MEDICARE

## 2024-01-25 NOTE — TELEPHONE ENCOUNTER
Trial: [facility]  PI: Ronnie  Date:01/25/2024   Purpose: [place] Trial Recruitment    I called Pt. at 10:15 am, and spoke to gauge their interest in participating in the [place] trial.   Pt. was interested in enrolling in trial, provided information about the trial and  how to enroll. Pt. Informed me that he would call back tomorrow about more information about the trial and whether or not he would be willing to enroll. Expressed concern about being on multiple medications already which seemed to be the reason behind his uncertainty.

## 2024-01-30 ENCOUNTER — TELEPHONE (OUTPATIENT)
Dept: NEUROLOGY | Facility: CLINIC | Age: 64
End: 2024-01-30
Payer: MEDICARE

## 2024-01-30 NOTE — TELEPHONE ENCOUNTER
MARILYN returned phone call to pt's partner Pippa.   Pippa reported the following:  Unsure how Medicare is covering OP therapies, seemingly charged $50 every time he goes to therapy, just paid a large outstanding bill. Deductible, co pay, visit price? Was told by Amirah that they shouldn't pay anything since he needs it for PD.    MARILYN advised to call number on back of Medicare card to better understand how coverage works.    Pippa also mentioned that pt was declined for SSDI disability benefits- Myke Greenberg,  will help with appeal. SW assured pt and Pippa that this is the proper way to pursue SSDI, will get back pay.    Rosana Mckeon, SHY  She/ Her    ? Neurology ? Movement Disorders  [facility] ? Main [address] [address]   P: [phone]  F: [phone]

## 2024-02-06 ENCOUNTER — OFFICE VISIT (OUTPATIENT)
Dept: PRIMARY CARE CLINIC | Facility: CLINIC | Age: 64
End: 2024-02-06
Payer: MEDICARE

## 2024-02-06 ENCOUNTER — TELEPHONE (OUTPATIENT)
Dept: PRIMARY CARE CLINIC | Facility: CLINIC | Age: 64
End: 2024-02-06
Payer: MEDICARE

## 2024-02-06 VITALS
SYSTOLIC BLOOD PRESSURE: 138 MMHG | HEIGHT: 70 IN | HEART RATE: 80 BPM | DIASTOLIC BLOOD PRESSURE: 80 MMHG | OXYGEN SATURATION: 98 % | WEIGHT: 175.94 LBS | BODY MASS INDEX: 25.19 KG/M2

## 2024-02-06 DIAGNOSIS — Z12.11 COLON CANCER SCREENING: ICD-10-CM

## 2024-02-06 DIAGNOSIS — G20.C PARKINSONISM, UNSPECIFIED PARKINSONISM TYPE: ICD-10-CM

## 2024-02-06 DIAGNOSIS — Z00.00 HEALTHCARE MAINTENANCE: ICD-10-CM

## 2024-02-06 DIAGNOSIS — Z00.00 ENCOUNTER FOR ANNUAL WELLNESS VISIT (AWV) IN MEDICARE PATIENT: ICD-10-CM

## 2024-02-06 DIAGNOSIS — I10 ESSENTIAL HYPERTENSION: Primary | ICD-10-CM

## 2024-02-06 PROCEDURE — 99999 PR PBB SHADOW E&M-EST. PATIENT-LVL III: CPT | Mod: PBBFAC,,, | Performed by: INTERNAL MEDICINE

## 2024-02-06 PROCEDURE — 99215 OFFICE O/P EST HI 40 MIN: CPT | Mod: S$PBB,,, | Performed by: INTERNAL MEDICINE

## 2024-02-06 PROCEDURE — 99213 OFFICE O/P EST LOW 20 MIN: CPT | Mod: PBBFAC,PN | Performed by: INTERNAL MEDICINE

## 2024-02-06 RX ORDER — HYDROCHLOROTHIAZIDE 12.5 MG/1
12.5 TABLET ORAL DAILY
Qty: 30 TABLET | Refills: 11 | Status: SHIPPED | OUTPATIENT
Start: 2024-02-06 | End: 2024-02-06
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
## 2024-02-07 ENCOUNTER — PATIENT MESSAGE (OUTPATIENT)
Dept: RESEARCH | Facility: HOSPITAL | Age: 64
End: 2024-02-07
Payer: MEDICARE

## 2024-02-07 NOTE — ASSESSMENT & PLAN NOTE
Repeat blood pressure normal.  Has occasional elevated blood pressures but with risk of falls will not tried to aggressively bring it down.  Continue lifestyle modifications with present treatment.

## 2024-02-07 NOTE — ASSESSMENT & PLAN NOTE
- Yearly labs- labs ordered today  - Colon cancer screening- due for fit kit vs colonoscopy  - ACP- no documents with HPOA or living will.  He is a full code but would not like his life prolonged if no hope for recovery.  Forms given to bring for next visit.  - Vaccination- encourage medications  - mini cog- look within normal limits with the limitation of use of his hand.

## 2024-02-27 DIAGNOSIS — Z00.00 ENCOUNTER FOR MEDICARE ANNUAL WELLNESS EXAM: ICD-10-CM

## 2024-03-14 ENCOUNTER — PATIENT OUTREACH (OUTPATIENT)
Dept: ADMINISTRATIVE | Facility: HOSPITAL | Age: 64
End: 2024-03-14
Payer: MEDICARE

## 2024-03-14 NOTE — PROGRESS NOTES
Non-compliant report chart audits for [place], FLU VACCINE Chart review completed for  test overdue (mammograms, Colonoscopies, pap smears, DM labs, and/or EYE EXAMs)      Care Everywhere and media, updates requested and reviewed.      Last flu shot NEVER DONE.      If the patient does not wish to have a Flu vaccine, please document as follows-per MEDICARE guidelines:

## 2024-03-15 ENCOUNTER — LAB VISIT (OUTPATIENT)
Dept: LAB | Facility: HOSPITAL | Age: 64
End: 2024-03-15
Attending: INTERNAL MEDICINE
Payer: MEDICARE

## 2024-03-15 DIAGNOSIS — I10 ESSENTIAL HYPERTENSION: ICD-10-CM

## 2024-03-15 DIAGNOSIS — R79.9 ABNORMAL BLOOD CHEMISTRY: ICD-10-CM

## 2024-03-15 DIAGNOSIS — D70.9 EOSINOPENIA: ICD-10-CM

## 2024-03-15 LAB
ALBUMIN SERPL BCP-MCNC: 4.3 G/DL (ref 3.5–5.2)
ALP SERPL-CCNC: 71 U/L (ref 55–135)
ALT SERPL W/O P-5'-P-CCNC: <5 U/L (ref 10–44)
ANION GAP SERPL CALC-SCNC: 8 MMOL/L (ref 8–16)
AST SERPL-CCNC: 13 U/L (ref 10–40)
BASOPHILS # BLD AUTO: 0.04 K/UL (ref 0–0.2)
BASOPHILS NFR BLD: 0.8 % (ref 0–1.9)
BILIRUB SERPL-MCNC: 1.2 MG/DL (ref 0.1–1)
BUN SERPL-MCNC: 13 MG/DL (ref 8–23)
CALCIUM SERPL-MCNC: 9.5 MG/DL (ref 8.7–10.5)
CHLORIDE SERPL-SCNC: 105 MMOL/L (ref 95–110)
CHOLEST SERPL-MCNC: 146 MG/DL (ref 120–199)
CHOLEST/HDLC SERPL: 2.7 {RATIO} (ref 2–5)
CO2 SERPL-SCNC: 27 MMOL/L (ref 23–29)
CREAT SERPL-MCNC: 0.9 MG/DL (ref 0.5–1.4)
DIFFERENTIAL METHOD BLD: ABNORMAL
EOSINOPHIL # BLD AUTO: 0 K/UL (ref 0–0.5)
EOSINOPHIL NFR BLD: 0 % (ref 0–8)
ERYTHROCYTE [DISTWIDTH] IN BLOOD BY AUTOMATED COUNT: 12.1 % (ref 11.5–14.5)
EST. GFR  (NO RACE VARIABLE): >60 ML/MIN/1.73 M^2
ESTIMATED AVG GLUCOSE: 111 MG/DL (ref 68–131)
GLUCOSE SERPL-MCNC: 109 MG/DL (ref 70–110)
HBA1C MFR BLD: 5.5 % (ref 4–5.6)
HCT VFR BLD AUTO: 46 % (ref 40–54)
HDLC SERPL-MCNC: 54 MG/DL (ref 40–75)
HDLC SERPL: 37 % (ref 20–50)
HGB BLD-MCNC: 15.6 G/DL (ref 14–18)
IMM GRANULOCYTES # BLD AUTO: 0.01 K/UL (ref 0–0.04)
IMM GRANULOCYTES NFR BLD AUTO: 0.2 % (ref 0–0.5)
LDLC SERPL CALC-MCNC: 78.8 MG/DL (ref 63–159)
LYMPHOCYTES # BLD AUTO: 1.1 K/UL (ref 1–4.8)
LYMPHOCYTES NFR BLD: 22.5 % (ref 18–48)
MCH RBC QN AUTO: 31.5 PG (ref 27–31)
MCHC RBC AUTO-ENTMCNC: 33.9 G/DL (ref 32–36)
MCV RBC AUTO: 93 FL (ref 82–98)
MONOCYTES # BLD AUTO: 0.4 K/UL (ref 0.3–1)
MONOCYTES NFR BLD: 7.5 % (ref 4–15)
NEUTROPHILS # BLD AUTO: 3.3 K/UL (ref 1.8–7.7)
NEUTROPHILS NFR BLD: 69 % (ref 38–73)
NONHDLC SERPL-MCNC: 92 MG/DL
NRBC BLD-RTO: 0 /100 WBC
PLATELET # BLD AUTO: 194 K/UL (ref 150–450)
PMV BLD AUTO: 10.7 FL (ref 9.2–12.9)
POTASSIUM SERPL-SCNC: 4.7 MMOL/L (ref 3.5–5.1)
PROT SERPL-MCNC: 6.6 G/DL (ref 6–8.4)
RBC # BLD AUTO: 4.96 M/UL (ref 4.6–6.2)
SODIUM SERPL-SCNC: 140 MMOL/L (ref 136–145)
TRIGL SERPL-MCNC: 66 MG/DL (ref 30–150)
TSH SERPL DL<=0.005 MIU/L-ACNC: 1.25 UIU/ML (ref 0.4–4)
WBC # BLD AUTO: 4.8 K/UL (ref 3.9–12.7)

## 2024-03-15 PROCEDURE — 85025 COMPLETE CBC W/AUTO DIFF WBC: CPT | Performed by: INTERNAL MEDICINE

## 2024-03-15 PROCEDURE — 83036 HEMOGLOBIN GLYCOSYLATED A1C: CPT | Performed by: INTERNAL MEDICINE

## 2024-03-15 PROCEDURE — 84443 ASSAY THYROID STIM HORMONE: CPT | Performed by: INTERNAL MEDICINE

## 2024-03-15 PROCEDURE — 80053 COMPREHEN METABOLIC PANEL: CPT | Performed by: INTERNAL MEDICINE

## 2024-03-15 PROCEDURE — 36415 COLL VENOUS BLD VENIPUNCTURE: CPT | Performed by: INTERNAL MEDICINE

## 2024-03-15 PROCEDURE — 80061 LIPID PANEL: CPT | Performed by: INTERNAL MEDICINE

## 2024-03-16 ENCOUNTER — PATIENT MESSAGE (OUTPATIENT)
Dept: PRIMARY CARE CLINIC | Facility: CLINIC | Age: 64
End: 2024-03-16
Payer: MEDICARE

## 2024-03-19 ENCOUNTER — OFFICE VISIT (OUTPATIENT)
Dept: PRIMARY CARE CLINIC | Facility: CLINIC | Age: 64
End: 2024-03-19
Payer: MEDICARE

## 2024-03-19 ENCOUNTER — OFFICE VISIT (OUTPATIENT)
Dept: DERMATOLOGY | Facility: CLINIC | Age: 64
End: 2024-03-19
Payer: MEDICARE

## 2024-03-19 VITALS
WEIGHT: 176.44 LBS | DIASTOLIC BLOOD PRESSURE: 84 MMHG | HEART RATE: 51 BPM | BODY MASS INDEX: 25.26 KG/M2 | SYSTOLIC BLOOD PRESSURE: 142 MMHG | HEIGHT: 70 IN | OXYGEN SATURATION: 100 %

## 2024-03-19 DIAGNOSIS — Z00.00 HEALTHCARE MAINTENANCE: ICD-10-CM

## 2024-03-19 DIAGNOSIS — L82.0 INFLAMED SEBORRHEIC KERATOSIS: ICD-10-CM

## 2024-03-19 DIAGNOSIS — D70.9 EOSINOPENIA: ICD-10-CM

## 2024-03-19 DIAGNOSIS — R29.6 FALLS FREQUENTLY: ICD-10-CM

## 2024-03-19 DIAGNOSIS — I10 ESSENTIAL HYPERTENSION: Primary | ICD-10-CM

## 2024-03-19 DIAGNOSIS — G20.C PARKINSONISM, UNSPECIFIED PARKINSONISM TYPE: ICD-10-CM

## 2024-03-19 PROCEDURE — 17110 DESTRUCTION B9 LES UP TO 14: CPT | Mod: S$PBB,,, | Performed by: STUDENT IN AN ORGANIZED HEALTH CARE EDUCATION/TRAINING PROGRAM

## 2024-03-19 PROCEDURE — 99499 UNLISTED E&M SERVICE: CPT | Mod: S$PBB,,, | Performed by: STUDENT IN AN ORGANIZED HEALTH CARE EDUCATION/TRAINING PROGRAM

## 2024-03-19 PROCEDURE — 17110 DESTRUCTION B9 LES UP TO 14: CPT | Mod: PBBFAC | Performed by: STUDENT IN AN ORGANIZED HEALTH CARE EDUCATION/TRAINING PROGRAM

## 2024-03-19 PROCEDURE — 99213 OFFICE O/P EST LOW 20 MIN: CPT | Mod: PBBFAC,25 | Performed by: STUDENT IN AN ORGANIZED HEALTH CARE EDUCATION/TRAINING PROGRAM

## 2024-03-19 PROCEDURE — 99214 OFFICE O/P EST MOD 30 MIN: CPT | Mod: PBBFAC,27,PN,25 | Performed by: INTERNAL MEDICINE

## 2024-03-19 PROCEDURE — 99215 OFFICE O/P EST HI 40 MIN: CPT | Mod: S$PBB,,, | Performed by: INTERNAL MEDICINE

## 2024-03-19 PROCEDURE — 99999 PR PBB SHADOW E&M-EST. PATIENT-LVL III: CPT | Mod: PBBFAC,,, | Performed by: STUDENT IN AN ORGANIZED HEALTH CARE EDUCATION/TRAINING PROGRAM

## 2024-03-19 PROCEDURE — 99999 PR PBB SHADOW E&M-EST. PATIENT-LVL IV: CPT | Mod: PBBFAC,,, | Performed by: INTERNAL MEDICINE

## 2024-03-19 RX ORDER — MELOXICAM 7.5 MG/1
7.5 TABLET ORAL DAILY
COMMUNITY

## 2024-03-19 RX ORDER — IRBESARTAN 150 MG/1
150 TABLET ORAL NIGHTLY
Qty: 30 TABLET | Refills: 3 | Status: SHIPPED | OUTPATIENT
Start: 2024-03-19 | End: 2024-04-01

## 2024-03-19 NOTE — ASSESSMENT & PLAN NOTE
- Yearly labs- done on 03/15/2024  - Colon cancer screening- fit kit ordered.  - ACP- no documents with HPOA or living will.  Forms given on last visit.  - Vaccination- due for shingles, RSV, flu, COVID booster, and pneumonia.

## 2024-03-19 NOTE — PATIENT INSTRUCTIONS

## 2024-03-19 NOTE — PROGRESS NOTES
Subjective:       Patient ID: Claude E. Escude is a 64 y.o. male.    Chief Complaint: Hypertension      HPI  Claude E. Escude is a 64 y.o. male with hypertension, Parkinson's disease who presents today for Hypertension    Reports has been having speech therapy but not physical therapy.  Had 2 falls in the last month 1 day after the other with no injuries.  Falls related to loss of balance.  There is no weakness or numbness.    Not checking his blood pressure at home.  Has not received communication from digital program.  No headaches, palpitations, shortness of breath, or leg swelling.  Took his medication this morning.    Tolerating Parkinson's medication.  No dizzy spells or GI complains.  Has been able to write with his left hand and stays as active as possible, right leg sometimes slower to respond.    Recent labs with satisfactory blood cell count, eosinophils about the same and can be observed.  Metabolic panel with normal kidney and liver function, no diabetes.  Mild increase in bilirubin can be observed.  Lipid profile with improved LDL and HDL.  Thyroid function test is within normal limits.    Has no toxic habits.  Has been adjusting his diet with decrease fast food and sugar drinks with healthier options.      Health Maintenance:  Health Maintenance   Topic Date Due    Hepatitis C Screening  Never done    Shingles Vaccine (1 of 2) Never done    Colorectal Cancer Screening  08/30/2023    PROSTATE-SPECIFIC ANTIGEN  02/17/2024    Lipid Panel  03/15/2029    TETANUS VACCINE  08/17/2032       Review of Systems   Constitutional: Negative.  Negative for fever and unexpected weight change.   HENT: Negative.     Respiratory: Negative.     Cardiovascular: Negative.    Gastrointestinal: Negative.    Genitourinary:  Negative for difficulty urinating.   Musculoskeletal: Negative.    Integumentary:  Negative.   Neurological:  Positive for speech difficulty (pressured speech). Negative for tremors, weakness, headaches  and memory loss.        Loss of balance   Psychiatric/Behavioral: Negative.  Negative for sleep disturbance (sleeping as usual, about 6 hrs.).       Past Medical History:   Diagnosis Date    HTN (hypertension)        History reviewed. No pertinent surgical history.    Family History   Problem Relation Age of Onset    Heart disease Mother     Hypertension Mother     Diabetes Mellitus Mother     Arthritis Mother     Prostate cancer Father 48    No Known Problems Sister     Bone cancer Brother 43    Heart attack Brother 46        smoked    Heart disease Maternal Grandmother     Heart disease Maternal Grandfather     Heart disease Paternal Grandmother     Heart disease Paternal Grandfather        Social History     Socioeconomic History    Marital status:    Tobacco Use    Smoking status: Never    Smokeless tobacco: Never     Social Determinants of Health     Financial Resource Strain: Low Risk  (1/16/2024)    Overall Financial Resource Strain (CARDIA)     Difficulty of Paying Living Expenses: Not very hard   Food Insecurity: No Food Insecurity (1/16/2024)    Hunger Vital Sign     Worried About Running Out of Food in the Last Year: Never true     Ran Out of Food in the Last Year: Never true   Transportation Needs: No Transportation Needs (1/16/2024)    PRAPARE - Transportation     Lack of Transportation (Medical): No     Lack of Transportation (Non-Medical): No   Physical Activity: Sufficiently Active (1/16/2024)    Exercise Vital Sign     Days of Exercise per Week: 5 days     Minutes of Exercise per Session: 60 min   Stress: No Stress Concern Present (1/16/2024)    [employer] [place] [employer] - Occupational Stress Questionnaire     Feeling of Stress : Only a little   Social Connections: Unknown (1/16/2024)    Social Connection and Isolation Panel [NHANES]     Frequency of Communication with Friends and Family: Three times a week     Frequency of Social Gatherings with Friends and Family: Twice a  "week     Active Member of Clubs or Organizations: No     Attends Club or Organization Meetings: Never     Marital Status:    Housing Stability: Low Risk  (1/16/2024)    Housing Stability Vital Sign     Unable to Pay for Housing in the Last Year: No     Number of Places Lived in the Last Year: 1     Unstable Housing in the Last Year: No       Current Outpatient Medications   Medication Sig Dispense Refill    blood pressure kit med and lrg Kit 1 each by Misc.(Non-Drug; Combo Route) route Daily. 1 each 5    carbidopa-levodopa  mg (SINEMET)  mg per tablet Take 2 tablets by mouth 3 (three) times daily. 540 tablet 3    meloxicam (MOBIC) 7.5 MG tablet Take 7.5 mg by mouth once daily.      selegiline HCl (ELDEPRYL) 5 mg tablet Take 1 tablet (5 mg total) by mouth 2 (two) times daily with meals. 180 tablet 3    sildenafiL (VIAGRA) 100 MG tablet Take 1 tablet (100 mg total) by mouth daily as needed for Erectile Dysfunction. 30 tablet 11    irbesartan (AVAPRO) 150 MG tablet Take 1 tablet (150 mg total) by mouth every evening. 30 tablet 3     No current facility-administered medications for this visit.       Review of patient's allergies indicates:  No Known Allergies      Objective:       Last 3 sets of Vitals        2/6/2024     3:27 PM 3/19/2024     8:36 AM 3/19/2024    11:10 AM   Vitals - 1 value per visit   SYSTOLIC 146 142    DIASTOLIC 88 84    Pulse 80 51    SPO2 98 % 100 %    Weight (lb) 175.93 176.46    Weight (kg) 79.8 80.04    Height 5' 10" (1.778 m) 5' 10" (1.778 m)    BMI (Calculated) 25.2 25.3    Pain Score  Zero Zero   Physical Exam  Constitutional:       General: He is not in acute distress.     Appearance: Normal appearance.   HENT:      Head: Normocephalic.   Eyes:      General: No scleral icterus.     Extraocular Movements: Extraocular movements intact.      Conjunctiva/sclera: Conjunctivae normal.   Neck:      Comments: No goiter.  Cardiovascular:      Rate and Rhythm: Normal rate and " regular rhythm.      Pulses: Normal pulses.      Heart sounds: Normal heart sounds.   Pulmonary:      Effort: Pulmonary effort is normal.      Breath sounds: Normal breath sounds.   Abdominal:      General: Bowel sounds are normal. There is no distension.      Palpations: Abdomen is soft. There is no mass.      Tenderness: There is no abdominal tenderness.   Musculoskeletal:         General: No swelling. Normal range of motion.   Lymphadenopathy:      Cervical: No cervical adenopathy.   Skin:     General: Skin is warm and dry.   Neurological:      General: No focal deficit present.      Mental Status: He is alert and oriented to person, place, and time.      Comments: No tremor noted or loss of balance.  Holds on to furniture when turning to sit.  Speech tends to be fast with periods of normal pace.   Psychiatric:         Mood and Affect: Mood normal.         Behavior: Behavior normal.           CBC:  Recent Labs   Lab 02/01/23  1110 02/17/23  0825 03/15/24  0838   WBC 4.84 4.84 4.80   RBC 4.70 4.79 4.96   Hemoglobin 14.7 15.0 15.6   Hematocrit 43.3 44.0 46.0   Platelets 178 189 194   MCV 92 92 93   MCH 31.3 H 31.3 H 31.5 H   MCHC 33.9 34.1 33.9     CMP:  Recent Labs   Lab 08/24/22  0834 08/24/22  0834 02/17/23  0826 03/15/24  0838   Glucose 104 H  --  110 109   Calcium 9.3  --  9.3 9.5   Albumin 4.5  --  4.2 4.3   Total Protein 6.4  --  6.7 6.6   Sodium 143  --  140 140   Potassium 4.1  --  4.2 4.7   CO2 29  --  26 27   Chloride 107  --  106 105   BUN 16  --  11 13   Creatinine 0.85  --  1.0 0.9   Alkaline Phosphatase  --    < > 56 71   ALT 13  --  <5 L <5 L   AST 13  --  15 13   Total Bilirubin 0.8  --  1.0 1.2 H    < > = values in this interval not displayed.     URINALYSIS:       LIPIDS:  Recent Labs   Lab 08/24/22  0834 02/17/23  0825 02/17/23  0826 03/15/24  0838   TSH 2.40 1.215  --  1.254   HDL 51  --  49 54   Cholesterol 159  --  150 146   Triglycerides 69  --  69 66   LDL Cholesterol 92  --  87.2 78.8    HDL/Cholesterol Ratio 3.1  --  32.7 37.0   Non-HDL Cholesterol  --   --  101 92   Non HDL Chol. (LDL+VLDL) 108  --   --   --    Total Cholesterol/HDL Ratio  --   --  3.1 2.7     TSH:  Recent Labs   Lab 08/24/22  0834 02/17/23  0825 03/15/24  0838   TSH 2.40 1.215 1.254       A1C:  Recent Labs   Lab 03/24/21  1523 08/24/22  0834 02/17/23  0825 03/15/24  0838   Hemoglobin A1C 5.3 5.4 5.4 5.5       Imaging:  Fl Modified Barium Swallow Speech  Narrative: EXAMINATION:  FL MODIFIED BARIUM SWALLOW SPEECH STUDY    CLINICAL HISTORY:  Dysphagia, oropharyngeal phase    TECHNIQUE:  Conjunction with speech pathology, the patient ingested barium of varying consistencies to include thin, pudding, barium coated peaches, and cracker.    Fluoroscopy time: 2.8 minutes.    COMPARISON:  None.    FINDINGS:  Mild weakened pharyngeal constriction across tested consistencies with mild pharyngeal residue.  Two instances of purposeful oropharyngeal regurgitation and re-swallow with barium coated cracker.  No convincing laryngeal penetration or tracheal aspiration.  Some sticking of the barium tablet within the oral cavity on initial swallow attempt.  The pill was swallowed without difficulty on subsequent swallow attempt.  Impression: As above.    Please see separate speech pathology report further detail.    Electronically signed by: Julien Ackerman  Date:    01/26/2022  Time:    11:40      Assessment:       1. Essential hypertension    2. Falls frequently    3. Parkinsonism, unspecified Parkinsonism type    4. Eosinopenia    5. Healthcare maintenance            Plan:       1. Essential hypertension  Assessment & Plan:  Not controlled, persist with mild elevation of the blood pressure.  Will change losartan to Irbesartan with longer blood pressure coverage.  Will start 150 mg but may need 300 mg for target blood pressure.    Orders:  -     irbesartan (AVAPRO) 150 MG tablet; Take 1 tablet (150 mg total) by mouth every evening.  Dispense:  30 tablet; Refill: 3  -     Hypertension Digital Medicine (HDMP) Enrollment Order    2. Falls frequently  Comments:  Related to loss of balance and likely Parkinson's condition.  Physical therapy ordered.  Orders:  -     Ambulatory referral/consult to Physical/Occupational Therapy; Future; Expected date: 03/19/2024    3. Parkinsonism, unspecified Parkinsonism type  Overview:  Carbidopa levodopa  mg 2 tablets 3 times a day.  Selegiline 5 mg twice a day.    Assessment & Plan:  Receiving speech therapy but has not received physical therapy.  Referral placed.    Orders:  -     Ambulatory referral/consult to Physical/Occupational Therapy; Future; Expected date: 03/19/2024    4. Eosinopenia  Assessment & Plan:  Noted in recent labs evaluated by Hematology.    Chronic and possibly medication related.    Observe.      5. Healthcare maintenance  Assessment & Plan:  - Yearly labs- done on 03/15/2024  - Colon cancer screening- fit kit ordered.  - ACP- no documents with HPOA or living will.  Forms given on last visit.  - Vaccination- due for shingles, RSV, flu, COVID booster, and pneumonia.         Health Maintenance Due   Topic Date Due    Hepatitis C Screening  Never done    Shingles Vaccine (1 of 2) Never done    RSV Vaccine (Age 60+ and Pregnant patients) (1 - 1-dose 60+ series) Never done    Pneumococcal Vaccines (Age 65+) (1 of 1 - PCV) Never done    Colorectal Cancer Screening  08/30/2023    COVID-19 Vaccine (3 - 2023-24 season) 09/01/2023    PROSTATE-SPECIFIC ANTIGEN  02/17/2024        This includes face to face time and non-face to face time preparing to see the patient (eg, review of tests), obtaining and/or reviewing separately obtained history, documenting clinical information in the electronic or other health record, independently interpreting results and communicating results to the patient/family/caregiver, or care coordinator.       Return to clinic in 1 or 2 weeks for blood pressure check and 3  months.  WS 2-3    Evangelina Stone MD  [facility]  Disclaimer:  This note has been generated using voice-recognition software. There may be grammatical or spelling errors that have been missed during proof-reading

## 2024-03-19 NOTE — ASSESSMENT & PLAN NOTE
Noted in recent labs evaluated by Hematology.    Chronic and possibly medication related.    Observe.

## 2024-03-19 NOTE — ASSESSMENT & PLAN NOTE
Not controlled, persist with mild elevation of the blood pressure.  Will change losartan to Irbesartan with longer blood pressure coverage.  Will start 150 mg but may need 300 mg for target blood pressure.

## 2024-03-19 NOTE — PROGRESS NOTES
Subjective:      Patient ID:  Claude E. Escude is a 64 y.o. male who presents for   Chief Complaint   Patient presents with    Lesion     Pt here for skin lesion     Pt reports h/o skin cancer on L cheek (over 8 years ago)    Patient with new complaint of lesion(s)  Location: behind L ear  Duration: 1 year  Symptoms: itching and bothersome when scratched  Relieving factors/Previous treatments: none        Lesion        Review of Systems   Hematologic/Lymphatic: Does not bruise/bleed easily.       Objective:   Physical Exam   Constitutional: He appears well-developed and well-nourished.   Neurological: He is alert and oriented to person, place, and time.   Psychiatric: He has a normal mood and affect.   Skin:   Areas Examined (abnormalities noted in diagram):   Head / Face Inspection Performed            Diagram Legend     Erythematous scaling macule/papule c/w actinic keratosis       Vascular papule c/w angioma      Pigmented verrucoid papule/plaque c/w seborrheic keratosis      Yellow umbilicated papule c/w sebaceous hyperplasia      Irregularly shaped tan macule c/w lentigo     1-2 mm smooth white papules consistent with Milia      Movable subcutaneous cyst with punctum c/w epidermal inclusion cyst      Subcutaneous movable cyst c/w pilar cyst      Firm pink to brown papule c/w dermatofibroma      Pedunculated fleshy papule(s) c/w skin tag(s)      Evenly pigmented macule c/w junctional nevus     Mildly variegated pigmented, slightly irregular-bordered macule c/w mildly atypical nevus      Flesh colored to evenly pigmented papule c/w intradermal nevus       Pink pearly papule/plaque c/w basal cell carcinoma      Erythematous hyperkeratotic cursted plaque c/w SCC      Surgical scar with no sign of skin cancer recurrence      Open and closed comedones      Inflammatory papules and pustules      Verrucoid papule consistent consistent with wart     Erythematous eczematous patches and plaques     Dystrophic  onycholytic nail with subungual debris c/w onychomycosis     Umbilicated papule    Erythematous-base heme-crusted tan verrucoid plaque consistent with inflamed seborrheic keratosis     Erythematous Silvery Scaling Plaque c/w Psoriasis     See annotation      Assessment / Plan:        Inflamed seborrheic keratosis  -     Ambulatory referral/consult to Dermatology    - lesion was itchy and painful when scratched    Procedure note for destruction via hyfrecation and curettage:    Verbal consent obtained. Risks of recurrence and scarring discussed.   1 lesions cleaned with alcohol and anesthetized with 1% lidocaine with epinephrine. Areas then lightly hyfrecated and curetted to remove gross lesion. Hemostasis achieved with aluminum chloride application. No complications.   Areas dressed with Vaseline jelly and bandage. Wound care discussed.    Pt tolerated well    F/u prn         Follow up if symptoms worsen or fail to improve.

## 2024-03-26 ENCOUNTER — LAB VISIT (OUTPATIENT)
Dept: LAB | Facility: HOSPITAL | Age: 64
End: 2024-03-26
Attending: INTERNAL MEDICINE
Payer: MEDICARE

## 2024-03-26 DIAGNOSIS — Z12.11 COLON CANCER SCREENING: ICD-10-CM

## 2024-03-26 PROCEDURE — 82274 ASSAY TEST FOR BLOOD FECAL: CPT | Performed by: INTERNAL MEDICINE

## 2024-04-01 ENCOUNTER — OFFICE VISIT (OUTPATIENT)
Dept: PRIMARY CARE CLINIC | Facility: CLINIC | Age: 64
End: 2024-04-01
Payer: MEDICARE

## 2024-04-01 VITALS
HEIGHT: 70 IN | HEART RATE: 72 BPM | BODY MASS INDEX: 24.97 KG/M2 | WEIGHT: 174.38 LBS | SYSTOLIC BLOOD PRESSURE: 148 MMHG | OXYGEN SATURATION: 99 % | RESPIRATION RATE: 16 BRPM | DIASTOLIC BLOOD PRESSURE: 78 MMHG

## 2024-04-01 DIAGNOSIS — G20.C PARKINSONISM, UNSPECIFIED PARKINSONISM TYPE: ICD-10-CM

## 2024-04-01 DIAGNOSIS — I10 ESSENTIAL HYPERTENSION: Primary | ICD-10-CM

## 2024-04-01 PROCEDURE — 99214 OFFICE O/P EST MOD 30 MIN: CPT | Mod: S$PBB,,, | Performed by: INTERNAL MEDICINE

## 2024-04-01 PROCEDURE — 99213 OFFICE O/P EST LOW 20 MIN: CPT | Mod: PBBFAC,PN | Performed by: INTERNAL MEDICINE

## 2024-04-01 PROCEDURE — 99999 PR PBB SHADOW E&M-EST. PATIENT-LVL III: CPT | Mod: PBBFAC,,, | Performed by: INTERNAL MEDICINE

## 2024-04-01 RX ORDER — IRBESARTAN 150 MG/1
150 TABLET ORAL 2 TIMES DAILY
Qty: 60 TABLET | Refills: 3 | Status: SHIPPED | OUTPATIENT
Start: 2024-04-01 | End: 2025-04-01

## 2024-04-01 NOTE — ASSESSMENT & PLAN NOTE
Tolerating irbesartan 150 mg but blood pressure still on the higher side.  Will increase to 150 mg twice a day.  If tolerated can consider changing 300 mg daily.

## 2024-04-01 NOTE — ASSESSMENT & PLAN NOTE
Will be starting physical therapy next week and has follow-up with Neurology in 2 weeks.  Advised to take his time or slower walk.  Likes to continue doing his house maintenance and hobbies, and avoid assisting equipment.

## 2024-04-01 NOTE — PROGRESS NOTES
Subjective:       Patient ID: Claude E. Escude is a 64 y.o. male.    Chief Complaint: Follow-up      Follow-up      Claude E. Escude is a 64 y.o. male with  hypertension, Parkinson's disease who presents today for Follow-up    Now on Irbesartan 150 mg daily for blood pressure.  Reports has been feeling found with the medication.  Does not monitor his blood pressure at home.  Denies headaches, lightheadedness, weakness, or shortness a breath.  Takes his medication in the evening.    Had 3 falls last week.  A fall related to walking in the hallway with no lytes, another time he tripped on a rock, and the 3rd time was also tripped while mowing the lawn.  There was no injury or dizziness.  In the morning tends to have the short step door shuffled gait.  Will be starting physical therapy next week.        Health Maintenance:  Health Maintenance   Topic Date Due    Hepatitis C Screening  Never done    Shingles Vaccine (1 of 2) Never done    Colorectal Cancer Screening  08/30/2023    PROSTATE-SPECIFIC ANTIGEN  02/17/2024    Lipid Panel  03/15/2029    TETANUS VACCINE  08/17/2032       Review of Systems   All other systems reviewed and are negative.     Past Medical History:   Diagnosis Date    HTN (hypertension)        History reviewed. No pertinent surgical history.    Family History   Problem Relation Age of Onset    Heart disease Mother     Hypertension Mother     Diabetes Mellitus Mother     Arthritis Mother     Prostate cancer Father 48    No Known Problems Sister     Bone cancer Brother 43    Heart attack Brother 46        smoked    Heart disease Maternal Grandmother     Heart disease Maternal Grandfather     Heart disease Paternal Grandmother     Heart disease Paternal Grandfather        Social History     Socioeconomic History    Marital status:    Tobacco Use    Smoking status: Never    Smokeless tobacco: Never     Social Determinants of Health     Financial Resource Strain: Low Risk  (1/16/2024)    Overall  Financial Resource Strain (CARDIA)     Difficulty of Paying Living Expenses: Not very hard   Food Insecurity: No Food Insecurity (1/16/2024)    Hunger Vital Sign     Worried About Running Out of Food in the Last Year: Never true     Ran Out of Food in the Last Year: Never true   Transportation Needs: No Transportation Needs (1/16/2024)    PRAPARE - Transportation     Lack of Transportation (Medical): No     Lack of Transportation (Non-Medical): No   Physical Activity: Sufficiently Active (1/16/2024)    Exercise Vital Sign     Days of Exercise per Week: 5 days     Minutes of Exercise per Session: 60 min   Stress: No Stress Concern Present (1/16/2024)    [employer] - Occupational Stress Questionnaire     Feeling of Stress : Only a little   Social Connections: Unknown (1/16/2024)    Social Connection and Isolation Panel [NHANES]     Frequency of Communication with Friends and Family: Three times a week     Frequency of Social Gatherings with Friends and Family: Twice a week     Active Member of Clubs or Organizations: No     Attends Club or Organization Meetings: Never     Marital Status:    Housing Stability: Low Risk  (1/16/2024)    Housing Stability Vital Sign     Unable to Pay for Housing in the Last Year: No     Number of Places Lived in the Last Year: 1     Unstable Housing in the Last Year: No       Current Outpatient Medications   Medication Sig Dispense Refill    blood pressure kit med and lrg Kit 1 each by Misc.(Non-Drug; Combo Route) route Daily. 1 each 5    carbidopa-levodopa  mg (SINEMET)  mg per tablet Take 2 tablets by mouth 3 (three) times daily. 540 tablet 3    meloxicam (MOBIC) 7.5 MG tablet Take 7.5 mg by mouth once daily.      selegiline HCl (ELDEPRYL) 5 mg tablet Take 1 tablet (5 mg total) by mouth 2 (two) times daily with meals. 180 tablet 3    sildenafiL (VIAGRA) 100 MG tablet Take 1 tablet (100 mg total) by mouth daily as needed for Erectile  "Dysfunction. 30 tablet 11    irbesartan (AVAPRO) 150 MG tablet Take 1 tablet (150 mg total) by mouth 2 (two) times a day. 60 tablet 3     No current facility-administered medications for this visit.       Review of patient's allergies indicates:  No Known Allergies      Objective:       Last 3 sets of Vitals        3/19/2024     8:36 AM 3/19/2024    11:10 AM 4/1/2024     1:58 PM   Vitals - 1 value per visit   SYSTOLIC 142  148   DIASTOLIC 84  78   Pulse 51  72   Resp   16   SPO2 100 %  99 %   Weight (lb) 176.46  174.38   Weight (kg) 80.04  79.1   Height 5' 10" (1.778 m)  5' 10" (1.778 m)   BMI (Calculated) 25.3  25   Pain Score Zero Zero Zero   Physical Exam  Constitutional:       General: He is not in acute distress.     Appearance: Normal appearance.   Eyes:      General: No scleral icterus.     Extraocular Movements: Extraocular movements intact.      Conjunctiva/sclera: Conjunctivae normal.   Neck:      Comments: No goiter.  Cardiovascular:      Rate and Rhythm: Normal rate and regular rhythm.      Pulses: Normal pulses.      Heart sounds: Normal heart sounds.   Pulmonary:      Effort: Pulmonary effort is normal.      Breath sounds: Normal breath sounds.   Abdominal:      General: Bowel sounds are normal. There is no distension.      Palpations: Abdomen is soft.   Musculoskeletal:         General: No signs of injury.   Lymphadenopathy:      Cervical: No cervical adenopathy.   Skin:     General: Skin is warm and dry.   Neurological:      General: No focal deficit present.      Mental Status: He is alert and oriented to person, place, and time.   Psychiatric:         Mood and Affect: Mood normal.         Behavior: Behavior normal.           CBC:  Recent Labs   Lab 02/01/23  1110 02/17/23  0825 03/15/24  0838   WBC 4.84 4.84 4.80   RBC 4.70 4.79 4.96   Hemoglobin 14.7 15.0 15.6   Hematocrit 43.3 44.0 46.0   Platelets 178 189 194   MCV 92 92 93   MCH 31.3 H 31.3 H 31.5 H   MCHC 33.9 34.1 33.9     CMP:  Recent Labs "   Lab 08/24/22  0834 08/24/22  0834 02/17/23  0826 03/15/24  0838   Glucose 104 H  --  110 109   Calcium 9.3  --  9.3 9.5   Albumin 4.5  --  4.2 4.3   Total Protein 6.4  --  6.7 6.6   Sodium 143  --  140 140   Potassium 4.1  --  4.2 4.7   CO2 29  --  26 27   Chloride 107  --  106 105   BUN 16  --  11 13   Creatinine 0.85  --  1.0 0.9   Alkaline Phosphatase  --    < > 56 71   ALT 13  --  <5 L <5 L   AST 13  --  15 13   Total Bilirubin 0.8  --  1.0 1.2 H    < > = values in this interval not displayed.     URINALYSIS:       LIPIDS:  Recent Labs   Lab 08/24/22  0834 02/17/23  0825 02/17/23  0826 03/15/24  0838   TSH 2.40 1.215  --  1.254   HDL 51  --  49 54   Cholesterol 159  --  150 146   Triglycerides 69  --  69 66   LDL Cholesterol 92  --  87.2 78.8   HDL/Cholesterol Ratio 3.1  --  32.7 37.0   Non-HDL Cholesterol  --   --  101 92   Non HDL Chol. (LDL+VLDL) 108  --   --   --    Total Cholesterol/HDL Ratio  --   --  3.1 2.7     TSH:  Recent Labs   Lab 08/24/22  0834 02/17/23  0825 03/15/24  0838   TSH 2.40 1.215 1.254       A1C:  Recent Labs   Lab 08/24/22  0834 02/17/23  0825 03/15/24  0838   Hemoglobin A1C 5.4 5.4 5.5       Imaging:  Fl Modified Barium Swallow Speech  Narrative: EXAMINATION:  FL MODIFIED BARIUM SWALLOW SPEECH STUDY    CLINICAL HISTORY:  Dysphagia, oropharyngeal phase    TECHNIQUE:  Conjunction with speech pathology, the patient ingested barium of varying consistencies to include thin, pudding, barium coated peaches, and cracker.    Fluoroscopy time: 2.8 minutes.    COMPARISON:  None.    FINDINGS:  Mild weakened pharyngeal constriction across tested consistencies with mild pharyngeal residue.  Two instances of purposeful oropharyngeal regurgitation and re-swallow with barium coated cracker.  No convincing laryngeal penetration or tracheal aspiration.  Some sticking of the barium tablet within the oral cavity on initial swallow attempt.  The pill was swallowed without difficulty on subsequent swallow  attempt.  Impression: As above.    Please see separate speech pathology report further detail.    Electronically signed by: Julien Ackerman  Date:    01/26/2022  Time:    11:40      Assessment:       1. Essential hypertension    2. Parkinsonism, unspecified Parkinsonism type            Plan:       1. Essential hypertension  Overview:  Now on losartan 150 mg daily, will increase to twice a day.    Assessment & Plan:  Tolerating irbesartan 150 mg but blood pressure still on the higher side.  Will increase to 150 mg twice a day.  If tolerated can consider changing 300 mg daily.    Orders:  -     irbesartan (AVAPRO) 150 MG tablet; Take 1 tablet (150 mg total) by mouth 2 (two) times a day.  Dispense: 60 tablet; Refill: 3    2. Parkinsonism, unspecified Parkinsonism type  Overview:  Carbidopa levodopa  mg 2 tablets 3 times a day.  Selegiline 5 mg twice a day.    Assessment & Plan:  Will be starting physical therapy next week and has follow-up with Neurology in 2 weeks.  Advised to take his time or slower walk.  Likes to continue doing his house maintenance and hobbies, and avoid assisting equipment.         Health Maintenance Due   Topic Date Due    Hepatitis C Screening  Never done    Shingles Vaccine (1 of 2) Never done    RSV Vaccine (Age 60+ and Pregnant patients) (1 - 1-dose 60+ series) Never done    Pneumococcal Vaccines (Age 65+) (1 of 1 - PCV) Never done    Colorectal Cancer Screening  08/30/2023    COVID-19 Vaccine (3 - 2023-24 season) 09/01/2023    PROSTATE-SPECIFIC ANTIGEN  02/17/2024        I spent a total of 20 minutes on the day of the visit.This includes face to face time and non-face to face time preparing to see the patient (eg, review of tests), obtaining and/or reviewing separately obtained history, documenting clinical information in the electronic or other health record, independently interpreting results and communicating results to the patient/family/caregiver, or care coordinator.        Return to clinic in 2 weeks for BP check and 3 months for health maintenance visit.  WS 3    Evangelina Stone MD  [facility]  Disclaimer:  This note has been generated using voice-recognition software. There may be grammatical or spelling errors that have been missed during proof-reading

## 2024-04-03 LAB — HEMOCCULT STL QL IA: NEGATIVE

## 2024-04-04 ENCOUNTER — PATIENT MESSAGE (OUTPATIENT)
Dept: PRIMARY CARE CLINIC | Facility: CLINIC | Age: 64
End: 2024-04-04
Payer: MEDICARE

## 2024-04-06 ENCOUNTER — PATIENT MESSAGE (OUTPATIENT)
Dept: PRIMARY CARE CLINIC | Facility: CLINIC | Age: 64
End: 2024-04-06
Payer: MEDICARE

## 2024-04-06 DIAGNOSIS — I10 ESSENTIAL HYPERTENSION: Primary | ICD-10-CM

## 2024-04-08 RX ORDER — ACETAMINOPHEN 500 MG
1 TABLET ORAL DAILY
Qty: 1 EACH | Refills: 3 | Status: SHIPPED | OUTPATIENT
Start: 2024-04-08

## 2024-04-09 ENCOUNTER — CLINICAL SUPPORT (OUTPATIENT)
Dept: REHABILITATION | Facility: HOSPITAL | Age: 64
End: 2024-04-09
Attending: INTERNAL MEDICINE
Payer: MEDICARE

## 2024-04-09 DIAGNOSIS — R29.6 FALLS FREQUENTLY: ICD-10-CM

## 2024-04-09 DIAGNOSIS — R26.89 BALANCE PROBLEM: Primary | ICD-10-CM

## 2024-04-09 DIAGNOSIS — G20.C PARKINSONISM, UNSPECIFIED PARKINSONISM TYPE: ICD-10-CM

## 2024-04-09 PROCEDURE — 97162 PT EVAL MOD COMPLEX 30 MIN: CPT | Mod: PN

## 2024-04-09 NOTE — PLAN OF CARE
[facility]  Physical Therapy Initial Evaluation    Name: Claude E. Escude  Clinic Number: [id number]    Therapy Diagnosis:   Encounter Diagnoses   Name Primary?    Parkinsonism, unspecified Parkinsonism type     Falls frequently     Balance problem Yes        Physician: Evangelina Stone MD    Physician Orders: PT Eval and Treat   Medical Diagnosis from Referral:   G20.C (ICD-10-CM) - Parkinsonism, unspecified Parkinsonism type   R29.6 (ICD-10-CM) - Falls frequently   Evaluation Date: 4/9/2024  Authorization Period Expiration: 3/19/2025  Plan of Care Expiration: 5/23/2024  Visit # / Visits authorized: 1/1   FOTO: 1/5    Precautions: Standard; falls    Time In: 9:05 am  Time Out: 10:00 am  Total Appointment Time (timed & untimed codes): 55 minutes    SUBJECTIVE     Date of onset: 2 years     History of current condition - Claude reports: he was diagnosed Parkinson's about 2 years ago. Patient reports that he can walk as far as he wants and plays golf about once. Patient reports that he has been affected on his right side and that he will have falls when he tries to move to his right side. Patient reports pain is not much of an issue other than some left hip arthritis.     Falls: ~ 15 this year (1x per week)    Imaging: see EMR    Prior Therapy: yes, he did a few bouts of therapy and the [facility] program  Social History: lives with family   Occupation: retired   Prior Level of Function: 100% about 2 years ago   Current Level of Function: falling about once per week     Pain: pain is not an issue     Patients goals:   Patient would like to improve his balance, reduce shuffling, and reduce fall risk.      Medical History:   Past Medical History:   Diagnosis Date    HTN (hypertension)        Surgical History:   Claude E. Escude  has no past surgical history on file.    Medications:   Claude has a current medication list which includes the following prescription(s): blood pressure kit med and lrg, blood  pressure monitor, carbidopa-levodopa  mg, irbesartan, meloxicam, selegiline hcl, and sildenafil.    Allergies:   Review of patient's allergies indicates:  No Known Allergies       OBJECTIVE     Observation: pleasant; talking fast     L/E Strength w/ MicroFET Muscle Rubén Dynamometer Right Left Pain/Dysfunction with Movement   (approx 4 sec hold w/ max contraction)   Hip Flexion  5/5   5/5    Hip Abduction  5/5  5/5    Quadriceps  25.2 kg   25.1 kg     Hamstrings  16.0 kg   13.2 kg       Outcome Measures:   Single leg balance:    - left: > 15 seconds    - right < 15 seconds   30 second sit-to-stand test (without U/E support): 18x with upper extremity support       Limitation/Restriction for FOTO Knee Survey    Therapist reviewed FOTO scores for Claude E. Escude on 4/9/2024.   FOTO documents entered into [employer] - see Media section.    Limitation Score: 27%  Predicted limitation Score: 28%         TREATMENT     Total Treatment time (time-based codes) separate from Evaluation: 00 minutes      Claude received the treatments listed below:      EVALUATION ONLY     PATIENT EDUCATION AND HOME EXERCISES     Education provided:   - home exercise program   POC/Prognosis     Written Home Exercises Provided: yes. Exercises were reviewed and Claude was able to demonstrate them prior to the end of the session.  Claude demonstrated good  understanding of the education provided. See EMR under Patient Instructions for exercises provided during therapy sessions.    ASSESSMENT     Claude is a 64 y.o. male referred to outpatient Physical Therapy with a medical diagnosis of Parkinsonism and fall frequently. Patient presented with reports of his primary deficit involving loss of balance, specifically when he attempts dynamic tasks towards his right. Patient is an avid golfer and having difficulty with his short game due to lack of rhythm, control, and tommie since Parkinson's diagnosis. With objective tests and measures, patient  displays good strength measures and moderate to good static balance. Dynamic balance is patient's primary deficit. Patient would benefit from skilled PT focusing on dynamic balance training.     Patient prognosis is Good.   Patient will benefit from skilled outpatient Physical Therapy to address the deficits stated above and in the chart below, provide patient /family education, and to maximize patientt's level of independence.     Plan of care discussed with patient: Yes  Patient's spiritual, cultural and educational needs considered and patient is agreeable to the plan of care and goals as stated below:     Anticipated Barriers for therapy: Parkinson's     Medical Necessity is demonstrated by the following  History  Co-morbidities and personal factors that may impact the plan of care Co-morbidities:   HTN    Personal Factors:   no deficits     low   Examination  Body Structures and Functions, activity limitations and participation restrictions that may impact the plan of care Body Regions:   lower extremities    Body Systems:    gross symmetry  ROM  strength  gross coordinated movement  balance  gait  transfers  transitions  motor control  motor learning    Participation Restrictions:   None     Activity limitations:   Learning and applying knowledge  no deficits    General Tasks and Commands  no deficits    Communication  no deficits    Mobility  no deficits    Self care  no deficits    Domestic Life  no deficits    Interactions/Relationships  no deficits    Life Areas  no deficits    Community and Social Life  no deficits         high   Clinical Presentation evolving clinical presentation with changing clinical characteristics moderate   Decision Making/ Complexity Score: moderate     Goals  Long Term Goals:  1. Pt will be independent with updated HEP supplement PT in improving functional mobility.  2. Patient will report no falls throughout history of plan of care.   3. Patient will display the ability to lunge  laterally to the right and pick object up from floor.  4. Pt will improve FOTO knee survey score to </= 28% limited in order to demo improved functional mobility  5. Patient will improve single leg balance on  the right lower extremity from 15 seconds to 30 seconds.     Plan     Plan of care Certification: 4/9/2024 to 5/23/2024.    Outpatient Physical Therapy 1 times weekly for 6 weeks to include the following interventions: Gait Training, Manual Therapy, Moist Heat/ Ice, Neuromuscular Re-ed, Orthotic Management and Training, Patient Education, Therapeutic Activites and Therapeutic Exercise, ASTYM, Kinesiotape    Hudson Benson, PT, DPT

## 2024-04-15 ENCOUNTER — OFFICE VISIT (OUTPATIENT)
Dept: NEUROLOGY | Facility: CLINIC | Age: 64
End: 2024-04-15
Payer: MEDICARE

## 2024-04-15 VITALS
HEART RATE: 70 BPM | SYSTOLIC BLOOD PRESSURE: 146 MMHG | WEIGHT: 175 LBS | BODY MASS INDEX: 25.05 KG/M2 | HEIGHT: 70 IN | DIASTOLIC BLOOD PRESSURE: 83 MMHG

## 2024-04-15 DIAGNOSIS — R26.81 UNSTEADY GAIT: ICD-10-CM

## 2024-04-15 DIAGNOSIS — I10 ESSENTIAL HYPERTENSION: ICD-10-CM

## 2024-04-15 DIAGNOSIS — R47.89 RAPID RATE OF SPEECH: ICD-10-CM

## 2024-04-15 DIAGNOSIS — G20.B1 PARKINSON'S DISEASE WITH DYSKINESIA WITHOUT FLUCTUATING MANIFESTATIONS: Primary | ICD-10-CM

## 2024-04-15 DIAGNOSIS — R47.1 DYSARTHRIA: ICD-10-CM

## 2024-04-15 PROCEDURE — 99213 OFFICE O/P EST LOW 20 MIN: CPT | Mod: PBBFAC | Performed by: PHYSICIAN ASSISTANT

## 2024-04-15 PROCEDURE — 99999 PR PBB SHADOW E&M-EST. PATIENT-LVL III: CPT | Mod: PBBFAC,,, | Performed by: PHYSICIAN ASSISTANT

## 2024-04-15 PROCEDURE — 99214 OFFICE O/P EST MOD 30 MIN: CPT | Mod: S$PBB,,, | Performed by: PHYSICIAN ASSISTANT

## 2024-04-15 PROCEDURE — G2211 COMPLEX E/M VISIT ADD ON: HCPCS | Mod: S$PBB,,, | Performed by: PHYSICIAN ASSISTANT
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
## 2024-04-16 ENCOUNTER — CLINICAL SUPPORT (OUTPATIENT)
Dept: REHABILITATION | Facility: HOSPITAL | Age: 64
End: 2024-04-16
Payer: MEDICARE

## 2024-04-16 DIAGNOSIS — R26.81 UNSTEADY GAIT: ICD-10-CM

## 2024-04-16 DIAGNOSIS — R29.6 FALLS FREQUENTLY: ICD-10-CM

## 2024-04-16 DIAGNOSIS — G20.B1 PARKINSON'S DISEASE WITH DYSKINESIA WITHOUT FLUCTUATING MANIFESTATIONS: Primary | ICD-10-CM

## 2024-04-16 PROCEDURE — 97112 NEUROMUSCULAR REEDUCATION: CPT | Mod: PN

## 2024-04-16 PROCEDURE — 97110 THERAPEUTIC EXERCISES: CPT | Mod: PN

## 2024-04-16 PROCEDURE — 97530 THERAPEUTIC ACTIVITIES: CPT | Mod: PN

## 2024-04-16 NOTE — PATIENT INSTRUCTIONS
"Resume LSVT max daily exercises ONCE DAILY    Add 90 degree "high marching" turns    Practice turning right and left with high knee marching, turning 90 degrees with each step. It should take 4 steps with each foot to get back to your starting position.   "

## 2024-04-16 NOTE — PROGRESS NOTES
"[facility]   Physical Therapy Treatment Note      Name: Claude E. Escude  Clinic Number: [id number]    Therapy Diagnosis:   Encounter Diagnoses   Name Primary?    Parkinson's disease with dyskinesia without fluctuating manifestations Yes    Unsteady gait     Falls frequently      Physician: Evangelina Stone MD    Visit Date: 2024    Physician Orders: PT Eval and Treat   Medical Diagnosis from Referral:   G20.C (ICD-10-CM) - Parkinsonism, unspecified Parkinsonism type   R29.6 (ICD-10-CM) - Falls frequently   Evaluation Date: 2024  Authorization Period Expiration: 3/19/2025  Plan of Care Expiration: 2024  Visit # / Visits authorized:   FOTO:      Precautions: Standard; falls     Time In: 1430  Time Out: 1515  Total Billable Time: 45 minutes (1TE, 1 NR, 1 TA)      PTA Visit #: 0/5       Subjective     Patient reports: Sometimes has ankle soreness but none today.  He  will be  compliant with home exercise program.  Response to previous treatment: n/a eval only  Functional change: ongoing    Pain: 0/10  Location:  n/a     Objective      Objective Measures updated at progress report unless specified.     Treatment     Claude received the treatments listed below:      therapeutic exercises to develop ROM and flexibility for 10 minutes including:  scifit recumbent stepper x 5 min sprint hills L5.0 for tissue extensibility  Hamstring stretch bilateral 2 x 30" bilateral   Heel cord stretch bilateral 3 x 30" bilateral      neuromuscular re-education activities to improve: Coordination for 15 minutes. The following activities were included:  X10 bilateral alternating high step taps      Reviewed 3-5 reps of each LSVT BIG MAX DAILY exercise move for home exercise program. Cues and tips provided for each.     therapeutic activities to improve functional performance for 20 minutes, includin degree high knee marching turns - gave as home exercise program   BIG sit<>stands x " 10  Backwards walking x 4 laps in parallel bars, cues for step length  Education on freezing and amplitude to minimize falls from festination.        Patient Education and Home Exercises       Education provided:  Education on freezing and amplitude to minimize falls from festination. Educated to resume home exercise program.     Written Home Exercises Provided: yes. Exercises were reviewed and Claude was able to demonstrate them prior to the end of the session.  Claude demonstrated good  understanding of the education provided. See Electronic Medical Record under Patient Instructions for exercises provided during therapy sessions    Assessment     Claude did well with today's initial follow up session. He has finished LSVT in the past and was consistently doing max daily exercises until January of this year. He admitted that is when more of a decline stopped. We reviewed each max daily move and that will be his primary home exercise program to resume daily. We will not perform them each therapy session. Taught large amplitude 90 deg turns and pt was able to turn right and left without festination in that technique. Continue treatment for reinforcement of home exercise program and to teach functional gait techniques to minimize and adapt with freezing/festinations.     Claude Is progressing well towards his goals.   Patient prognosis is Good.     Patient will continue to benefit from skilled outpatient physical therapy to address the deficits listed in the problem list box on initial evaluation, provide pt/family education and to maximize pt's level of independence in the home and community environment.     Patient's spiritual, cultural and educational needs considered and pt agreeable to plan of care and goals.     Anticipated barriers to physical therapy: progressive nature of disease    Goals: Goals  Long Term Goals:  1. Pt will be independent with updated HEP supplement PT in improving functional mobility.  2.  Patient will report no falls throughout history of plan of care.   3. Patient will display the ability to lunge laterally to the right and pick object up from floor.  4. Pt will improve FOTO knee survey score to </= 28% limited in order to demo improved functional mobility  5. Patient will improve single leg balance on  the right lower extremity from 15 seconds to 30 seconds.     Plan     Continue plan of care (POC).   Check 90 deg turns.   Incorporate golfing positions/backing up to mimic frequent causes of falls.   Offer Big for Life.     Anupama Quiros, PT

## 2024-04-19 ENCOUNTER — CLINICAL SUPPORT (OUTPATIENT)
Dept: PRIMARY CARE CLINIC | Facility: CLINIC | Age: 64
End: 2024-04-19
Payer: MEDICARE

## 2024-04-19 VITALS
WEIGHT: 172.06 LBS | BODY MASS INDEX: 24.69 KG/M2 | HEART RATE: 83 BPM | SYSTOLIC BLOOD PRESSURE: 140 MMHG | OXYGEN SATURATION: 98 % | DIASTOLIC BLOOD PRESSURE: 90 MMHG

## 2024-04-19 DIAGNOSIS — I10 ESSENTIAL HYPERTENSION: Primary | ICD-10-CM

## 2024-04-19 PROCEDURE — 99213 OFFICE O/P EST LOW 20 MIN: CPT | Mod: PBBFAC,PN

## 2024-04-19 PROCEDURE — 99999 PR PBB SHADOW E&M-EST. PATIENT-LVL III: CPT | Mod: PBBFAC,,,

## 2024-04-19 NOTE — PROGRESS NOTES
Patient presented today for nurse visit to check blood pressure. All information verified including allergies, medications, medical hx, falls. Vital signs obtained. Pt verbalized that falls frequently from parkinson's. Pt blood pressure today is 140/90. Pt education on cardiac diet to assist in lowering blood pressure. All readings will be sent to Dr. Stone for review. AVS declined by patient.

## 2024-04-20 ENCOUNTER — TELEPHONE (OUTPATIENT)
Dept: PRIMARY CARE CLINIC | Facility: CLINIC | Age: 64
End: 2024-04-20
Payer: MEDICARE

## 2024-04-22 ENCOUNTER — PATIENT MESSAGE (OUTPATIENT)
Dept: PRIMARY CARE CLINIC | Facility: CLINIC | Age: 64
End: 2024-04-22
Payer: MEDICARE

## 2024-04-24 RX ORDER — HYDROCHLOROTHIAZIDE 12.5 MG/1
12.5 TABLET ORAL DAILY
Qty: 30 TABLET | Refills: 11 | Status: SHIPPED | OUTPATIENT
Start: 2024-04-24 | End: 2025-04-24

## 2024-04-29 PROBLEM — Z00.00 HEALTHCARE MAINTENANCE: Status: RESOLVED | Noted: 2024-01-23 | Resolved: 2024-04-29

## 2024-05-02 ENCOUNTER — CLINICAL SUPPORT (OUTPATIENT)
Dept: REHABILITATION | Facility: HOSPITAL | Age: 64
End: 2024-05-02
Payer: MEDICARE

## 2024-05-02 DIAGNOSIS — R29.6 FALLS FREQUENTLY: ICD-10-CM

## 2024-05-02 DIAGNOSIS — R26.81 UNSTEADY GAIT: Primary | ICD-10-CM

## 2024-05-02 DIAGNOSIS — G20.B1 PARKINSON'S DISEASE WITH DYSKINESIA WITHOUT FLUCTUATING MANIFESTATIONS: ICD-10-CM

## 2024-05-02 PROCEDURE — 97530 THERAPEUTIC ACTIVITIES: CPT | Mod: PN,CQ

## 2024-05-02 PROCEDURE — 97110 THERAPEUTIC EXERCISES: CPT | Mod: PN,CQ

## 2024-05-02 PROCEDURE — 97112 NEUROMUSCULAR REEDUCATION: CPT | Mod: PN,CQ

## 2024-05-02 NOTE — PROGRESS NOTES
"[facility]   Physical Therapy Treatment Note      Name: Claude E. Escude  Clinic Number: [id number]    Therapy Diagnosis:   Encounter Diagnoses   Name Primary?    Unsteady gait Yes    Parkinson's disease with dyskinesia without fluctuating manifestations     Falls frequently      Physician: Evangelina Stone MD    Visit Date: 5/2/2024    Physician Orders: PT Eval and Treat   Medical Diagnosis from Referral:   G20.C (ICD-10-CM) - Parkinsonism, unspecified Parkinsonism type   R29.6 (ICD-10-CM) - Falls frequently   Evaluation Date: 4/9/2024  Authorization Period Expiration: 3/19/2025  Plan of Care Expiration: 5/23/2024  Visit # / Visits authorized: 2/20 (+eval)  FOTO: 1/5     Precautions: Standard; falls     Time In: 12:00 PM  Time Out: 12:45 PM  Total Billable Time: 45 minutes (1TE, 1 NR, 1 TA)      PTA Visit #: 1/5     Subjective     Patient reports: Missed appointments last week because of bad case of pink eye.  Had a fall yesterday getting out a sand trap while playing golf.  He did not play well.  He  will be  compliant with home exercise program.  Response to previous treatment: ongoing  Functional change: ongoing    Pain: 0/10  Location:  n/a     Objective      Objective Measures updated at progress report unless specified.     Treatment     Claude received the treatments listed below:      therapeutic exercises to develop ROM and flexibility for 10 minutes including:  scifit recumbent stepper x 5 min sprint hills L5.0 for tissue extensibility  Hamstring stretch bilateral 2 x 30" bilateral   Heel cord stretch bilateral 3 x 30" bilateral      neuromuscular re-education activities to improve: Coordination for 20 minutes. The following activities were included:  Reciprocal 12" step taps from gym mat 2x20 bilateral alternating high step taps    Reverse lunges 2x10 on gym mat     Forward step and reach 2x10 B on gym mat  Lateral step and reach 2x10 ea way on gym mat  Backward step and reach 2x10 B " on gym mat  Twist and reach 2x10 B on gym mat     Alternating hand knee slaps 6 lengths x 10 feet on gym mat    Treadmill: backward: 1.4 speed for 4 minutes                 : lateral 0.8-0.9 speed for 3 minutes each way    therapeutic activities to improve functional performance for 15 minutes, including:  Large amplitude sit<>stands x 10 on gym mat  Backwards walking x 4 laps in parallel bars, cues for step length    Treadmill: backward: 1.4 speed for 4 minutes                 : lateral 0.8-0.9 speed for 3 minutes each way       Patient Education and Home Exercises       Education provided:  Education on freezing and amplitude to minimize falls from festination. Educated to resume home exercise program.     Written Home Exercises Provided: yes. Exercises were reviewed and Claude was able to demonstrate them prior to the end of the session.  Claude demonstrated good  understanding of the education provided. See Electronic Medical Record under Patient Instructions for exercises provided during therapy sessions    Assessment     Claude arrived to session without any complaints and was agreeable to treatment.  Reports a fall in the sandpit yesterday when playing golf and confirms most falls happen on right side and on either grass or sand.  All large amplitude and dynamic balance completed on gym mat today for desired challenge level and to simulate uneven surfaces patient tends to lose his balance.  Good tolerance of treadmill trial, no loss of balance.  Verbal cuing on treadmill to increase right step length and increase heel strike.  Appropriate levels of fatigue achieved.  HE would benefit from continued PT services to decrease future fall risk.      Claude Is progressing well towards his goals.   Patient prognosis is Good.     Patient will continue to benefit from skilled outpatient physical therapy to address the deficits listed in the problem list box on initial evaluation, provide pt/family education and to  maximize pt's level of independence in the home and community environment.     Patient's spiritual, cultural and educational needs considered and pt agreeable to plan of care and goals.     Anticipated barriers to physical therapy: progressive nature of disease    Goals: Goals  Long Term Goals:  1. Pt will be independent with updated HEP supplement PT in improving functional mobility.  2. Patient will report no falls throughout history of plan of care.   3. Patient will display the ability to lunge laterally to the right and pick object up from floor.  4. Pt will improve FOTO knee survey score to </= 28% limited in order to demo improved functional mobility  5. Patient will improve single leg balance on  the right lower extremity from 15 seconds to 30 seconds.     Plan     Continue plan of care (POC).   Check 90 deg turns.   Incorporate golfing positions/backing up to mimic frequent causes of falls.   Offer [employer].     Margarita Hernandes, PTA

## 2024-05-07 ENCOUNTER — CLINICAL SUPPORT (OUTPATIENT)
Dept: REHABILITATION | Facility: HOSPITAL | Age: 64
End: 2024-05-07
Payer: MEDICARE

## 2024-05-07 DIAGNOSIS — G20.B1 PARKINSON'S DISEASE WITH DYSKINESIA WITHOUT FLUCTUATING MANIFESTATIONS: ICD-10-CM

## 2024-05-07 DIAGNOSIS — R26.81 UNSTEADY GAIT: Primary | ICD-10-CM

## 2024-05-07 DIAGNOSIS — R29.6 FALLS FREQUENTLY: ICD-10-CM

## 2024-05-07 PROBLEM — Z74.09 IMPAIRED MOBILITY AND ACTIVITIES OF DAILY LIVING: Status: RESOLVED | Noted: 2023-10-17 | Resolved: 2024-05-07

## 2024-05-07 PROBLEM — Z78.9 IMPAIRED MOBILITY AND ACTIVITIES OF DAILY LIVING: Status: RESOLVED | Noted: 2023-10-17 | Resolved: 2024-05-07

## 2024-05-07 PROCEDURE — 97530 THERAPEUTIC ACTIVITIES: CPT | Mod: PN

## 2024-05-07 PROCEDURE — 97110 THERAPEUTIC EXERCISES: CPT | Mod: PN

## 2024-05-07 NOTE — PROGRESS NOTES
"[facility]   Physical Therapy Treatment Note      Name: Claude E. Escude  Clinic Number: [id number]    Therapy Diagnosis:   Encounter Diagnoses   Name Primary?    Unsteady gait Yes    Parkinson's disease with dyskinesia without fluctuating manifestations     Falls frequently      Physician: Evangelina Stone MD    Visit Date: 5/7/2024    Physician Orders: PT Eval and Treat   Medical Diagnosis from Referral:   G20.C (ICD-10-CM) - Parkinsonism, unspecified Parkinsonism type   R29.6 (ICD-10-CM) - Falls frequently   Evaluation Date: 4/9/2024  Authorization Period Expiration: 3/19/2025  Plan of Care Expiration: 5/23/2024  Visit # / Visits authorized: 3/20 (+eval)  FOTO: 0/5     Precautions: Standard; falls     Time In: 1:45 PM  Time Out: 2:27 PM  Total Billable Time: 42 minutes (1TE, 2TA)      PTA Visit #: 0/5     Subjective     Patient reports: He had a fall in his backyard the other day but he fell into the grass. No injuries noted. Notes he struggles the most with backwards walking and turning to the right.  He  will be  compliant with home exercise program.  Response to previous treatment: ongoing  Functional change: ongoing    Pain: 0/10  Location:  n/a     Objective      Objective Measures updated at progress report unless specified.     Treatment     Claude received the treatments listed below:      therapeutic exercises to develop ROM and flexibility for 17 minutes including:  Hamstring stretch bilateral 2 x 45" bilateral   Heel cord stretch bilateral 2 x45" bilateral   Standing open books x5B with 10" holds  Session ended on SciFit recumbent stepper x 8 minutes level 5 single peak with bilateral upper extremity / bilateral lower extremity reciprocal motion      neuromuscular re-education activities to improve: Coordination for 00 minutes. The following activities were included:    Not performed today  Reciprocal 12" step taps from gym mat 2x20 bilateral alternating high step taps    Reverse " "lunges 2x10 on gym mat   Forward step and reach 2x10 B on gym mat  Lateral step and reach 2x10 ea way on gym mat  Backward step and reach 2x10 B on gym mat  Twist and reach 2x10 B on gym mat     therapeutic activities to improve functional performance for 25 minutes, including:  Forward walking on treadmill; speed setting 2.0; incline setting 2.0 without bilateral upper extremity support  Backwards walking overground 2x30' SBA  Backwards walking overground + volleyball chest pass 2x30' SBA (tech assist)  Backwards walking overground + volleyball bounce pass 2x30' SBA (tech assist)  Reciprocal cross over knee taps 2x45'  Carioka drills 2x30' each direction   Sit to stands from 24" plyobox + red med ball shoulder press 2x10    Patient Education and Home Exercises       Education provided:  Education on freezing and amplitude to minimize falls from festination. Educated to resume home exercise program.     Written Home Exercises Provided: yes. Exercises were reviewed and Claude was able to demonstrate them prior to the end of the session.  Claude demonstrated good  understanding of the education provided. See Electronic Medical Record under Patient Instructions for exercises provided during therapy sessions    Assessment     Claude arrived to session without any complaints and was agreeable to treatment. Included several backwards walking and bilateral lower extremity coordination drills to mimic context of recent falls. No loss of balance noted throughout but amplitude of limb movement does decline to some extent with dual task. Rotational activity included as well; patient did require cuing to keep open books in pain limited range considering chronic shoulder pain. He would benefit from continued PT services to achieve functional goals.    Claude Is progressing well towards his goals.   Patient prognosis is Good.     Patient will continue to benefit from skilled outpatient physical therapy to address the deficits " listed in the problem list box on initial evaluation, provide pt/family education and to maximize pt's level of independence in the home and community environment.     Patient's spiritual, cultural and educational needs considered and pt agreeable to plan of care and goals.     Anticipated barriers to physical therapy: progressive nature of disease    Goals: Goals  Long Term Goals:  1. Pt will be independent with updated HEP supplement PT in improving functional mobility.  2. Patient will report no falls throughout history of plan of care. (Not met)  3. Patient will display the ability to lunge laterally to the right and pick object up from floor.  4. Pt will improve FOTO knee survey score to </= 28% limited in order to demo improved functional mobility  5. Patient will improve single leg balance on  the right lower extremity from 15 seconds to 30 seconds.     Plan     Continue to progress as per plan of care; formal HEP soon    MURTAZA DE LA CRUZ, PT

## 2024-05-14 ENCOUNTER — CLINICAL SUPPORT (OUTPATIENT)
Dept: REHABILITATION | Facility: HOSPITAL | Age: 64
End: 2024-05-14
Payer: MEDICARE

## 2024-05-14 DIAGNOSIS — R29.6 FALLS FREQUENTLY: ICD-10-CM

## 2024-05-14 DIAGNOSIS — G20.B1 PARKINSON'S DISEASE WITH DYSKINESIA WITHOUT FLUCTUATING MANIFESTATIONS: ICD-10-CM

## 2024-05-14 DIAGNOSIS — R26.81 UNSTEADY GAIT: Primary | ICD-10-CM

## 2024-05-14 PROCEDURE — 97110 THERAPEUTIC EXERCISES: CPT | Mod: PN,CQ

## 2024-05-14 PROCEDURE — 97530 THERAPEUTIC ACTIVITIES: CPT | Mod: PN,CQ

## 2024-05-14 NOTE — PROGRESS NOTES
"[facility]   Physical Therapy Treatment Note      Name: Claude E. Escude  Clinic Number: [id number]    Therapy Diagnosis:   Encounter Diagnoses   Name Primary?    Unsteady gait Yes    Parkinson's disease with dyskinesia without fluctuating manifestations     Falls frequently        Physician: Evangelina Stone MD    Visit Date: 5/14/2024    Physician Orders: PT Eval and Treat   Medical Diagnosis from Referral:   G20.C (ICD-10-CM) - Parkinsonism, unspecified Parkinsonism type   R29.6 (ICD-10-CM) - Falls frequently   Evaluation Date: 4/9/2024  Authorization Period Expiration: 3/19/2025  Plan of Care Expiration: 5/23/2024  Visit # / Visits authorized: 4/20 (+eval)  FOTO: 0/5     Precautions: Standard; falls     Time In: 11:15 AM  Time Out: 12:00 PM  Total Billable Time: 45 minutes (1TE, 2TA)    PTA Visit #: 1/5     Subjective     Patient reports: Had a good weekend with no falls and he even cut three different lawns which usually results in a few falls.    He  will be  compliant with home exercise program.  Response to previous treatment: ongoing  Functional change: ongoing    Pain: 0/10  Location:  n/a     Objective      Objective Measures updated at progress report unless specified.     Treatment     Claude received the treatments listed below:      therapeutic exercises to develop ROM and flexibility for 20 minutes including:  Hamstring stretch bilateral 2 x 45" bilateral   Heel cord stretch bilateral 2 x45" bilateral   Standing open books x5B with 10" holds  Session ended on SciFit recumbent stepper x 8 minutes level 5 single peak with bilateral upper extremity / bilateral lower extremity reciprocal motion      neuromuscular re-education activities to improve: Coordination for 00 minutes. The following activities were included:    Not performed today  Reciprocal 12" step taps from gym mat 2x20 bilateral alternating high step taps    Reverse lunges 2x10 on gym mat   Forward step and reach 2x10 B " "on gym mat  Lateral step and reach 2x10 ea way on gym mat  Backward step and reach 2x10 B on gym mat  Twist and reach 2x10 B on gym mat     therapeutic activities to improve functional performance for 25 minutes, including:  Forward walking on treadmill; speed setting 2.5; incline setting 2.0 without bilateral upper extremity support    Completed on uneven gym mat today:  Reciprocal cross over knee taps 2x45'  Carioka drills 2x30' each direction   Alternating reverse lunges with overhead press blue med bal 2x45"  Lateral steps with squat and blue med ball chest press 6 lengths 2x45"  Cone weaving: lateral and forward 6 lengths x 12 feet    Not completed today:  Sit to stands from 24" plyobox + red med ball shoulder press 2x10  Backwards walking overground 2x30' SBA  Backwards walking overground + volleyball chest pass 2x30' SBA (tech assist)  Backwards walking overground + volleyball bounce pass 2x30' SBA (tech assist)  Patient Education and Home Exercises       Education provided:  Education on freezing and amplitude to minimize falls from festination. Educated to resume home exercise program.     Written Home Exercises Provided: yes. Exercises were reviewed and Claude was able to demonstrate them prior to the end of the session.  Claude demonstrated good  understanding of the education provided. See Electronic Medical Record under Patient Instructions for exercises provided during therapy sessions    Assessment     Claude arrived to session without any complaints and was agreeable to treatment. Denies any recent falls since last session.  Completed coordination drills and dynamic balance on uneven gym mat this session with only mild loss of balance when turning to right with cone weaving.  Appropriate levels of fatigue achieved. He would benefit from continued PT services to achieve functional goals.    Claude Is progressing well towards his goals.   Patient prognosis is Good.     Patient will continue to benefit " from skilled outpatient physical therapy to address the deficits listed in the problem list box on initial evaluation, provide pt/family education and to maximize pt's level of independence in the home and community environment.     Patient's spiritual, cultural and educational needs considered and pt agreeable to plan of care and goals.     Anticipated barriers to physical therapy: progressive nature of disease    Goals: Goals  Long Term Goals:  1. Pt will be independent with updated HEP supplement PT in improving functional mobility.  2. Patient will report no falls throughout history of plan of care. (Not met)  3. Patient will display the ability to lunge laterally to the right and pick object up from floor.  4. Pt will improve FOTO knee survey score to </= 28% limited in order to demo improved functional mobility  5. Patient will improve single leg balance on  the right lower extremity from 15 seconds to 30 seconds.     Plan     Continue to progress as per plan of care; formal HEP soon    Margarita Hernandes, PTA

## 2024-05-22 ENCOUNTER — PATIENT MESSAGE (OUTPATIENT)
Dept: NEUROLOGY | Facility: CLINIC | Age: 64
End: 2024-05-22
Payer: MEDICARE

## 2024-05-23 ENCOUNTER — CLINICAL SUPPORT (OUTPATIENT)
Dept: REHABILITATION | Facility: HOSPITAL | Age: 64
End: 2024-05-23
Payer: MEDICARE

## 2024-05-23 DIAGNOSIS — G20.B1 PARKINSON'S DISEASE WITH DYSKINESIA WITHOUT FLUCTUATING MANIFESTATIONS: ICD-10-CM

## 2024-05-23 DIAGNOSIS — R29.6 FALLS FREQUENTLY: ICD-10-CM

## 2024-05-23 DIAGNOSIS — R26.81 UNSTEADY GAIT: Primary | ICD-10-CM

## 2024-05-23 PROCEDURE — 97530 THERAPEUTIC ACTIVITIES: CPT | Mod: KX,PN

## 2024-05-23 NOTE — PROGRESS NOTES
[facility]   Physical Therapy Treatment Note      Name: Claude E. Escude  [facility] Number: [id number]    Therapy Diagnosis:   Encounter Diagnoses   Name Primary?    Unsteady gait Yes    Parkinson's disease with dyskinesia without fluctuating manifestations     Falls frequently      Physician: Evangelina Stone MD    Visit Date: 5/23/2024    Physician Orders: PT Eval and Treat   Medical Diagnosis from Referral:   G20.C (ICD-10-CM) - Parkinsonism, unspecified Parkinsonism type   R29.6 (ICD-10-CM) - Falls frequently   Evaluation Date: 4/9/2024  Authorization Period Expiration: 3/19/2025  Plan of Care Expiration: 7/18/2024  Progress Note: Last formally assessed on 5/23/2024  Visit # / Visits authorized: 5/20 (+eval)  FOTO: 2/3 completed     Precautions: Standard; falls     Time In: 11:21AM  Time Out: 12:00 PM  Total Billable Time: 39 minutes (3TA) KX ALL MOVING FORWARD    PTA Visit #: 0/5     Subjective     Patient reports: See updated plan of care   He  was not  compliant with home exercise program.  Response to previous treatment: ongoing  Functional change: ongoing    Pain: 0/10  Location:  n/a     Objective      Objective Measures updated at progress report unless specified.     FOTO Degenerative CNS Disorders: 81%    Single Leg Stance:  - right lower extremity: 15 seconds  - left lower extremity: 18 seconds    Functional Gait Assessment:     1. Gait on level surface =  3   (3) Normal: less than 5.5 sec, no A.D., no imbalance, normal gait pattern, deviates <6in   (2) Mild impairment: 7-5.6 sec, uses A.D., mild gait deviations, or deviates 6-10 in   (1) Moderate impairment: > 7 sec, slow speed, imbalance, deviates 10-15 in.   (0) Severe impairment: needs assist, deviates >15 in, reach/touch wall  2. Change in Gait Speed = 3   (3) Normal: smooth change w/o loss of balance or gait deviation, deviates < 6 in, significant difference between speeds   (2) Mild impairment: changes speed, but demonstrates  mild gait deviations, deviates 6-10 in, OR no deviations but unable to significantly speed, OR uses A.D.   (1) Moderate impairment: minor changes to speed, OR changes speed w/ significant deviations, deviates 10-15 in, OR  Changes speed , but loses balance & recovers   (0) Severe impairment: cannot change speed, deviates >15 in, or loses balance & needs assist  3. Gait with horizontal head turns  = 2   (3) Normal: no change in gait, deviates <6 in   (2) Mild impairment: slight change in speed, deviates 6-10 in, OR uses A.D.   (1) Moderate impairment: moderate change in speed, deviates 10-15 in   (0) Severe impairment: severe disruption of gait, deviates >15in  4. Gait with vertical head turns = 2   (3) Normal: no change in gait, deviates <6 in   (2) Mild impairment: slight change in speed, deviates 6-10 in OR uses A.D.   (1) Moderate impairment: moderate change in speed, deviates 10-15 in   (0) Severe impairment: severe disruption of gait, deviates >15 in  5. Gait with pivot turns = 3   (3) Normal: performs safely in 3 sec, no LOB   (2) Mild impairment: performs in >3 sec & no LOB, OR turns safely & requires several steps to regain LOB   (1) Moderate impairment: turns slow, OR requires several small steps for balance following turn & stop   (0) Severe impairment: cannot turn safely, needs assist  6. Step over obstacle = 2   (3) Normal: steps over 2 stacked boxes w/o change in speed or LOB   (2) Mild impairment: able to step over 1 box w/o change in speed or LOB   (1) Moderate impairment: steps over 1 box but must slow down, may require VC   (0) Severe impairment: cannot perform w/o assist  7. Gait with Narrow PAMELA = 3   (3) Normal: 10 steps no staggering   (2) Mild impairment: 7-9 steps   (1) Moderate impairment: 4-7 steps   (0) Severe impairment: < 4 steps or cannot perform w/o assist  8. Gait with eyes closed = 2   (3) Normal: < 7 sec, no A.D., no LOB, normal gait pattern, deviates <6 in   (2) Mild impairment:  7.1-9 sec, mild gait deviations, deviates 6-10 in   (1) Moderate impairment: > 9 sec, abnormal pattern, LOB, deviates 10-15 in   (0) Severe impairment: cannot perform w/o assist, LOB, deviates >15in  9. Ambulating Backwards = 3   (3) Normal: no A.D., no LOB, normal gait pattern, deviates <6in   (2) Mild impairment: uses A.D., slower speed, mild gait deviations, deviates 6-10 in   (1) Moderate impairment: slow speed, abnormal gait pattern, LOB, deviates 10-15 in   (0) Severe impairment: severe gait deviations or LOB, deviates >15in  10. Steps = 3   (3) Normal: alternating feet, no rail   (2) Mild Impairment: alternating feet, uses rail   (1) Moderate impairment: step-to, uses rail   (0) Severe impairment: cannot perform safely    Score 26/30     Cutoffs:   <22/30 fall risk in older adults  <18/30 fall risk in Parkinsons    MDC/MCID:  Stroke = 4.2 points (MDC)  Vestibular = 6 points (MDC)  Geriatric = 4 points (MCID)  Parkinson's = 4 points (MDC)    Treatment     Claude received the treatments listed below:      therapeutic exercises to develop ROM and flexibility for 00 minutes including:     neuromuscular re-education activities to improve: Coordination for 00 minutes. The following activities were included:    therapeutic activities to improve functional performance for 39 minutes, including:  Reassessment (see above)  HEP reinforcement   Forward obstacle course x 4 lengths x 30 feet each (uneven floor mat + 4 foam discs + 4 cone weaves + Airex balance beam)  Lateral obstacle course x 4 lengths x 30 feet each (uneven floor mat + 4 foam discs + 4 cone weaves + Airex balance beam)    Patient Education and Home Exercises       Education provided:  Education on freezing and amplitude to minimize falls from festination. Educated to resume home exercise program.     Written Home Exercises Provided: yes. Exercises were reviewed and Claude was able to demonstrate them prior to the end of the session.  Claude demonstrated  good  understanding of the education provided. See Electronic Medical Record under Patient Instructions for exercises provided during therapy sessions    Assessment     See updated plan of care    Claude Is progressing well towards his goals.   Patient prognosis is Good.     Patient will continue to benefit from skilled outpatient physical therapy to address the deficits listed in the problem list box on initial evaluation, provide pt/family education and to maximize pt's level of independence in the home and community environment.     Patient's spiritual, cultural and educational needs considered and pt agreeable to plan of care and goals.     Anticipated barriers to physical therapy: progressive nature of disease    Goals:  Long Term Goals = 6 weeks:  1. Pt will be independent with updated HEP supplement PT in improving functional mobility.  2. Patient will report no falls throughout history of plan of care. (Not met)  3. Patient will display the ability to lunge laterally to the right and pick object up from floor.  4. Pt will improve FOTO survey score to </= 28% limited in order to demo improved functional mobility (Met)  5. Patient will improve single leg balance on  the right lower extremity from 15 seconds to 30 seconds. (Met)    Updated Short Term Goals = 4 weeks  Patient will report daily performance of LSVT exercises in order to maximize therapeutic benefit     Updated Long Term Goals = 8 weeks  Patient will score >/=29/30 on FGA in order to improve dynamic postural control with community activities  Patient will report no falls over 4-week period in order to decrease risk for future injury and promote safe functional mobility  Patient will score >/=90% on FOTO survey in order to improve self perception of functional mobility    Plan     See updated plan of care; encourage safety when golfing. Continue balance training on unstable surfaces. Encourage better HEP compliance with already-established LSVT  program.    MURTAZA DE LA CRUZ, PT

## 2024-05-24 NOTE — PLAN OF CARE
"[facility]  Physical Therapy Plan of Care Note     Name: Claude E. Escude  Clinic Number: [id number]    Therapy Diagnosis:   Encounter Diagnoses   Name Primary?    Unsteady gait Yes    Parkinson's disease with dyskinesia without fluctuating manifestations     Falls frequently      Physician: Evangelina Stone MD    Visit Date: 5/23/2024    Physician Orders: PT Eval and Treat   Medical Diagnosis from Referral:   G20.C (ICD-10-CM) - Parkinsonism, unspecified Parkinsonism type   R29.6 (ICD-10-CM) - Falls frequently   Evaluation Date: 4/9/2024  Authorization Period Expiration: 3/19/2025  Plan of Care Expiration: 7/18/2024  Progress Note: Last formally assessed on 5/23/2024  Visit # / Visits authorized: 5/20 (+eval)  FOTO: 2/3 completed     Precautions: Standard; falls  Functional Level Prior to Evaluation:  from evaluation: "falling about once per week "    SUBJECTIVE     Update: Claude reports he had two falls since last seen in PT. One was in the morning at home before he took his medication and one was during a golf game. Admits he hasn't been regularly been performing his LSVT exercises, but he plans to during the next several weeks. Agreeable to additional PT services.    OBJECTIVE     Update:     FOTO Degenerative CNS Disorders: 81%     Single Leg Stance:  - right lower extremity: 15 seconds  - left lower extremity: 18 seconds     Functional Gait Assessment:      1. Gait on level surface =  3              (3) Normal: less than 5.5 sec, no A.D., no imbalance, normal gait pattern, deviates <6in              (2) Mild impairment: 7-5.6 sec, uses A.D., mild gait deviations, or deviates 6-10 in              (1) Moderate impairment: > 7 sec, slow speed, imbalance, deviates 10-15 in.              (0) Severe impairment: needs assist, deviates >15 in, reach/touch wall  2. Change in Gait Speed = 3              (3) Normal: smooth change w/o loss of balance or gait deviation, deviates < 6 in, significant " difference between speeds              (2) Mild impairment: changes speed, but demonstrates mild gait deviations, deviates 6-10 in, OR no deviations but unable to significantly speed, OR uses A.D.              (1) Moderate impairment: minor changes to speed, OR changes speed w/ significant deviations, deviates 10-15 in, OR  Changes speed , but loses balance & recovers              (0) Severe impairment: cannot change speed, deviates >15 in, or loses balance & needs assist  3. Gait with horizontal head turns  = 2              (3) Normal: no change in gait, deviates <6 in              (2) Mild impairment: slight change in speed, deviates 6-10 in, OR uses A.D.              (1) Moderate impairment: moderate change in speed, deviates 10-15 in              (0) Severe impairment: severe disruption of gait, deviates >15in  4. Gait with vertical head turns = 2              (3) Normal: no change in gait, deviates <6 in              (2) Mild impairment: slight change in speed, deviates 6-10 in OR uses A.D.              (1) Moderate impairment: moderate change in speed, deviates 10-15 in              (0) Severe impairment: severe disruption of gait, deviates >15 in  5. Gait with pivot turns = 3              (3) Normal: performs safely in 3 sec, no LOB              (2) Mild impairment: performs in >3 sec & no LOB, OR turns safely & requires several steps to regain LOB              (1) Moderate impairment: turns slow, OR requires several small steps for balance following turn & stop              (0) Severe impairment: cannot turn safely, needs assist  6. Step over obstacle = 2              (3) Normal: steps over 2 stacked boxes w/o change in speed or LOB              (2) Mild impairment: able to step over 1 box w/o change in speed or LOB              (1) Moderate impairment: steps over 1 box but must slow down, may require VC              (0) Severe impairment: cannot perform w/o assist  7. Gait with Narrow PAMELA = 3               "(3) Normal: 10 steps no staggering              (2) Mild impairment: 7-9 steps              (1) Moderate impairment: 4-7 steps              (0) Severe impairment: < 4 steps or cannot perform w/o assist  8. Gait with eyes closed = 2              (3) Normal: < 7 sec, no A.D., no LOB, normal gait pattern, deviates <6 in              (2) Mild impairment: 7.1-9 sec, mild gait deviations, deviates 6-10 in              (1) Moderate impairment: > 9 sec, abnormal pattern, LOB, deviates 10-15 in              (0) Severe impairment: cannot perform w/o assist, LOB, deviates >15in  9. Ambulating Backwards = 3              (3) Normal: no A.D., no LOB, normal gait pattern, deviates <6in              (2) Mild impairment: uses A.D., slower speed, mild gait deviations, deviates 6-10 in              (1) Moderate impairment: slow speed, abnormal gait pattern, LOB, deviates 10-15 in              (0) Severe impairment: severe gait deviations or LOB, deviates >15in  10. Steps = 3              (3) Normal: alternating feet, no rail              (2) Mild Impairment: alternating feet, uses rail              (1) Moderate impairment: step-to, uses rail              (0) Severe impairment: cannot perform safely     Score 26/30      Cutoffs:   <22/30 fall risk in older adults  <18/30 fall risk in Parkinsons     MDC/MCID:  Stroke = 4.2 points (MDC)  Vestibular = 6 points (MDC)  Geriatric = 4 points (MCID)  Parkinson's = 4 points (MDC)    ASSESSMENT     Update: Claude's static and dynamic postural control are objectively within normal limits, but he is still having frequent falls outside of physical therapy. Falls appear to be occurring during "off" periods prior to medication dose as well as during higher level activities such as on uneven surfaces during weekly golf outings. He is likely nearing the ceiling effect for FGA, but he would still benefit from higher level balance training to mimic golf course as this is where a large number of his " falls are occurring. PT also encouraged behavioral modifications such as slowing down/avoiding rushing during higher level tasks in order to improve safety awareness and decrease risk for future injury. He would also benefit from increasing frequency of HEP as he is currently non-compliant with past recommendations by other therapists for daily LSVT exercise. He would benefit from 8 additional weeks of skilled PT in order to improve higher level postural control strategies and decrease risk for future falls/injury.    Previous Short/Long Term Goals Status:   2/5 goals met  Updated Short Term Goals = 4 weeks  Patient will report daily performance of LSVT exercises in order to maximize therapeutic benefit   Updated Long Term Goals = 8 weeks  Patient will score >/=29/30 on FGA in order to improve dynamic postural control with community activities  Patient will report no falls over 4-week period in order to decrease risk for future injury and promote safe functional mobility  Patient will score >/=90% on FOTO survey in order to improve self perception of functional mobility  Reasons for Recertification of Therapy:   pending expiration of current plan of care    PLAN     Updated Certification Period: 5/24/2024 to 7/18/2024   Recommended Treatment Plan: 1 times per week for 8 weeks:  Gait Training, Manual Therapy, Moist Heat/ Ice, Neuromuscular Re-ed, Patient Education, Self Care, Therapeutic Activities, and Therapeutic Exercise  Other Recommendations: N/A    MURTAZA DE LA CRUZ, PT

## 2024-05-30 ENCOUNTER — CLINICAL SUPPORT (OUTPATIENT)
Dept: REHABILITATION | Facility: HOSPITAL | Age: 64
End: 2024-05-30
Payer: MEDICARE

## 2024-05-30 DIAGNOSIS — G20.B1 PARKINSON'S DISEASE WITH DYSKINESIA WITHOUT FLUCTUATING MANIFESTATIONS: ICD-10-CM

## 2024-05-30 DIAGNOSIS — R29.6 FALLS FREQUENTLY: ICD-10-CM

## 2024-05-30 DIAGNOSIS — R26.81 UNSTEADY GAIT: Primary | ICD-10-CM

## 2024-05-30 PROCEDURE — 97112 NEUROMUSCULAR REEDUCATION: CPT | Mod: KX,PN

## 2024-05-30 PROCEDURE — 97530 THERAPEUTIC ACTIVITIES: CPT | Mod: KX,PN

## 2024-05-30 NOTE — PROGRESS NOTES
"[facility]   Physical Therapy Treatment Note      Name: Claude E. Escude  Clinic Number: [id number]    Therapy Diagnosis:   Encounter Diagnoses   Name Primary?    Unsteady gait Yes    Parkinson's disease with dyskinesia without fluctuating manifestations     Falls frequently      Physician: Evangelina Stone MD    Visit Date: 5/30/2024    Physician Orders: PT Eval and Treat   Medical Diagnosis from Referral:   G20.C (ICD-10-CM) - Parkinsonism, unspecified Parkinsonism type   R29.6 (ICD-10-CM) - Falls frequently   Evaluation Date: 4/9/2024  Authorization Period Expiration: 3/19/2025  Plan of Care Expiration: 7/18/2024  Progress Note: Last formally assessed on 5/23/2024  Visit # / Visits authorized: 6/20 (+eval)  FOTO: 2/3 completed     Precautions: Standard; falls     Time In: 10:32AM  Time Out: 11:15AM  Total Billable Time: 43 minutes (1TA + 2NMR) KX ALL MOVING FORWARD    PTA Visit #: 0/5     Subjective     Patient reports: No new falls including during golf game yesterday. He has also been keeping up with LSVT exercises every other day.  He  was  compliant with home exercise program.  Response to previous treatment: ongoing  Functional change: ongoing    Pain: 0/10  Location:  n/a     Objective      Objective Measures updated at progress report unless specified.     Treatment     Claude received the treatments listed below:      therapeutic exercises to develop ROM and flexibility for 00 minutes including:     neuromuscular re-education activities to improve: Coordination for 28 minutes. The following activities were included:  Side stepping on Airex beam + reciprocal collapsible cone taps x 6 lengths x 10 feet each  Reciprocal step ups to dome side of Bosu + contralateral hip flexion thruster 2x10B  Single leg stance + red med ball chest press 3x10 each leg   Static straight arm pulldowns + reciprocal hip flexion march 3x20"; 10# engaged (each side) at [employer]  Paloff press x15B with 10# " engaged at Freem[employer]     therapeutic activities to improve functional performance for 15 minutes, including:  Session started on treadmill with forward walking x 8 minutes; incline level 2; speed setting 2.5  Carioca drill x 4 lengths x 35 feet each   Lateral monster walks in mini squat with green theraband x 4 lengths x 30 feet each    Patient Education and Home Exercises       Education provided:  Education on freezing and amplitude to minimize falls from festination. Educated to resume home exercise program.     Written Home Exercises Provided: yes. Exercises were reviewed and Claude was able to demonstrate them prior to the end of the session.  Claude demonstrated good  understanding of the education provided. See Electronic Medical Record under Patient Instructions for exercises provided during therapy sessions    Assessment     Claude tolerated session with moderate-high levels of fatigue and no adverse response. Good challenge noted with dynamic single leg stance activity. Appropriate core engagement noted during introduction of Freemotion exercises. No verbal cuing required to increase limb movement amplitude, but he does benefit from occasional cues to prioritize step accuracy over speed during pertinent exercise. He would benefit from continued PT services to decrease fall frequency.    Claude Is progressing well towards his goals.   Patient prognosis is Good.     Patient will continue to benefit from skilled outpatient physical therapy to address the deficits listed in the problem list box on initial evaluation, provide pt/family education and to maximize pt's level of independence in the home and community environment.     Patient's spiritual, cultural and educational needs considered and pt agreeable to plan of care and goals.     Anticipated barriers to physical therapy: progressive nature of disease    Goals:  Long Term Goals = 6 weeks:  1. Pt will be independent with updated HEP supplement PT in  improving functional mobility.  2. Patient will report no falls throughout history of plan of care. (Not met)  3. Patient will display the ability to lunge laterally to the right and pick object up from floor.  4. Pt will improve FOTO survey score to </= 28% limited in order to demo improved functional mobility (Met)  5. Patient will improve single leg balance on  the right lower extremity from 15 seconds to 30 seconds. (Met)    Updated Short Term Goals = 4 weeks  Patient will report daily performance of LSVT exercises in order to maximize therapeutic benefit     Updated Long Term Goals = 8 weeks  Patient will score >/=29/30 on FGA in order to improve dynamic postural control with community activities  Patient will report no falls over 4-week period in order to decrease risk for future injury and promote safe functional mobility  Patient will score >/=90% on FOTO survey in order to improve self perception of functional mobility    Plan     See updated plan of care; encourage safety when golfing. Continue balance training on unstable surfaces. Encourage better HEP compliance with already-established LSVT program.    MURTAZA DE LA CRUZ, PT

## 2024-06-01 ENCOUNTER — PATIENT MESSAGE (OUTPATIENT)
Dept: NEUROLOGY | Facility: CLINIC | Age: 64
End: 2024-06-01
Payer: MEDICARE

## 2024-06-06 ENCOUNTER — CLINICAL SUPPORT (OUTPATIENT)
Dept: REHABILITATION | Facility: HOSPITAL | Age: 64
End: 2024-06-06
Payer: MEDICARE

## 2024-06-06 DIAGNOSIS — R26.81 UNSTEADY GAIT: Primary | ICD-10-CM

## 2024-06-06 DIAGNOSIS — R29.6 FALLS FREQUENTLY: ICD-10-CM

## 2024-06-06 DIAGNOSIS — G20.B1 PARKINSON'S DISEASE WITH DYSKINESIA WITHOUT FLUCTUATING MANIFESTATIONS: ICD-10-CM

## 2024-06-06 PROCEDURE — 97112 NEUROMUSCULAR REEDUCATION: CPT | Mod: KX,PN,CQ

## 2024-06-06 PROCEDURE — 97530 THERAPEUTIC ACTIVITIES: CPT | Mod: KX,PN,CQ

## 2024-06-06 NOTE — PROGRESS NOTES
"[facility]   Physical Therapy Treatment Note      Name: Claude E. Escude  Clinic Number: [id number]    Therapy Diagnosis:   Encounter Diagnoses   Name Primary?    Unsteady gait Yes    Parkinson's disease with dyskinesia without fluctuating manifestations     Falls frequently      Physician: Evangelina Stone MD    Visit Date: 6/6/2024    Physician Orders: PT Eval and Treat   Medical Diagnosis from Referral:   G20.C (ICD-10-CM) - Parkinsonism, unspecified Parkinsonism type   R29.6 (ICD-10-CM) - Falls frequently   Evaluation Date: 4/9/2024  Authorization Period Expiration: 3/19/2025  Plan of Care Expiration: 7/18/2024  Progress Note: Last formally assessed on 5/23/2024  Visit # / Visits authorized: 7/20 (+eval)  FOTO: 2/3 completed     Precautions: Standard; falls     Time In: 8:45 AM  Time Out: 9:30 AM  Total Billable Time: 45 minutes (1TA + 2NMR) KX ALL MOVING FORWARD    PTA Visit #: 1/5     Subjective     Patient reports: No new falls including during golf game last week.  Will be going on vacation next week for a golfing tournament and will resume PT in 2 weeks.    He  was  compliant with home exercise program.  Response to previous treatment: ongoing  Functional change: ongoing    Pain: 0/10  Location:  n/a     Objective      Objective Measures updated at progress report unless specified.     Treatment     Claude received the treatments listed below:      therapeutic exercises to develop ROM and flexibility for 00 minutes including:     neuromuscular re-education activities to improve: Coordination for 30 minutes. The following activities were included:  Side stepping on Airex beam + reciprocal collapsible cone taps x 6 lengths x 10 feet each  Reciprocal step ups to dome side of Bosu + contralateral hip flexion thruster 2x10B  Single leg stance + red med ball chest press 3x10 each leg   Static straight arm pulldowns + reciprocal hip flexion march 3x20"; 10# engaged (each side) at " [employer]  Entrecff press x15B with 10# engaged at [employer]     therapeutic activities to improve functional performance for 15 minutes, including:  Session started on treadmill with forward walking x 8 minutes; incline level 2; speed setting 2.5  Carioca drill x 4 lengths x 35 feet each   Lateral monster walks in mini squat with green theraband x 4 lengths x 30 feet each    Patient Education and Home Exercises       Education provided:  Education on freezing and amplitude to minimize falls from festination. Educated to resume home exercise program.     Written Home Exercises Provided: yes. Exercises were reviewed and Claude was able to demonstrate them prior to the end of the session.  Claude demonstrated good  understanding of the education provided. See Electronic Medical Record under Patient Instructions for exercises provided during therapy sessions    Assessment     Claude arrived to session without complaints and denied any falls. He was motivated to have a productive session.  Good tolerance of session with moderate-high levels of fatigue and no adverse response. Good challenge noted with dynamic single leg stance activity. Appropriate core engagement noted during introduction of Freemotion exercises. No verbal cuing required to increase limb movement amplitude, but he does benefit from occasional cues to prioritize step accuracy over speed during pertinent exercise. He would benefit from continued PT services to decrease fall frequency.    Claude Is progressing well towards his goals.   Patient prognosis is Good.     Patient will continue to benefit from skilled outpatient physical therapy to address the deficits listed in the problem list box on initial evaluation, provide pt/family education and to maximize pt's level of independence in the home and community environment.     Patient's spiritual, cultural and educational needs considered and pt agreeable to plan of care and goals.     Anticipated  barriers to physical therapy: progressive nature of disease    Goals:  Long Term Goals = 6 weeks:  1. Pt will be independent with updated HEP supplement PT in improving functional mobility.  2. Patient will report no falls throughout history of plan of care. (Not met)  3. Patient will display the ability to lunge laterally to the right and pick object up from floor.  4. Pt will improve FOTO survey score to </= 28% limited in order to demo improved functional mobility (Met)  5. Patient will improve single leg balance on  the right lower extremity from 15 seconds to 30 seconds. (Met)    Updated Short Term Goals = 4 weeks  Patient will report daily performance of LSVT exercises in order to maximize therapeutic benefit     Updated Long Term Goals = 8 weeks  Patient will score >/=29/30 on FGA in order to improve dynamic postural control with community activities  Patient will report no falls over 4-week period in order to decrease risk for future injury and promote safe functional mobility  Patient will score >/=90% on FOTO survey in order to improve self perception of functional mobility    Plan     See updated plan of care; encourage safety when golfing. Continue balance training on unstable surfaces. Encourage better HEP compliance with already-established LSVT program.    Margarita Hernandes, PTA

## 2024-06-14 ENCOUNTER — PATIENT MESSAGE (OUTPATIENT)
Dept: NEUROLOGY | Facility: CLINIC | Age: 64
End: 2024-06-14
Payer: MEDICARE

## 2024-06-20 ENCOUNTER — CLINICAL SUPPORT (OUTPATIENT)
Dept: REHABILITATION | Facility: HOSPITAL | Age: 64
End: 2024-06-20
Payer: MEDICARE

## 2024-06-20 DIAGNOSIS — G20.B1 PARKINSON'S DISEASE WITH DYSKINESIA WITHOUT FLUCTUATING MANIFESTATIONS: ICD-10-CM

## 2024-06-20 DIAGNOSIS — R26.81 UNSTEADY GAIT: Primary | ICD-10-CM

## 2024-06-20 DIAGNOSIS — R29.6 FALLS FREQUENTLY: ICD-10-CM

## 2024-06-20 PROCEDURE — 97112 NEUROMUSCULAR REEDUCATION: CPT | Mod: PN,CQ

## 2024-06-20 PROCEDURE — 97530 THERAPEUTIC ACTIVITIES: CPT | Mod: PN,CQ

## 2024-06-20 NOTE — PROGRESS NOTES
"" I have been having a stabbing pain in my stomach that started this am. I did have a bowel movement around 12 noon today before I left work. "  " [facility]   Physical Therapy Treatment Note      Name: Claude E. Escude  Clinic Number: [id number]    Therapy Diagnosis:   Encounter Diagnoses   Name Primary?    Unsteady gait Yes    Parkinson's disease with dyskinesia without fluctuating manifestations     Falls frequently      Physician: Evangelina Stone MD    Visit Date: 6/20/2024    Physician Orders: PT Eval and Treat   Medical Diagnosis from Referral:   G20.C (ICD-10-CM) - Parkinsonism, unspecified Parkinsonism type   R29.6 (ICD-10-CM) - Falls frequently   Evaluation Date: 4/9/2024  Authorization Period Expiration: 3/19/2025  Plan of Care Expiration: 7/18/2024  Progress Note: Last formally assessed on 5/23/2024  Visit # / Visits authorized: 8/20 (+eval)  FOTO: 2/3 completed     Precautions: Standard; falls     Time In: 9:46 AM  Time Out: 10:30 AM  Total Billable Time: 44 minutes (1TA + 2NMR) KX ALL MOVING FORWARD    PTA Visit #: 2/5     Subjective     Patient reports: Two new falls last week, both while playing golf.  One when he was backing up from golf cart and the other bending forward to  ball. He also feels his freezing episodes are increasing in frequency and intensity.   Does not have another appointment with neurology until October.   He  was  compliant with home exercise program.  Response to previous treatment: ongoing  Functional change: ongoing    Pain: 0/10  Location:  n/a     Objective      Objective Measures updated at progress report unless specified.     Treatment     Claude received the treatments listed below:      therapeutic exercises to develop ROM and flexibility for 00 minutes including:     neuromuscular re-education activities to improve: Coordination for 30 minutes. The following activities were included:  Side stepping on Airex beam + reciprocal collapsible cone taps x 6 lengths x 10 feet each  Reciprocal step ups to dome side of Bosu + contralateral hip flexion thruster 2x10B  Single leg stance + red med  "ball chest press 3x10 each leg   Static straight arm pulldowns + reciprocal hip flexion march 3x20"; 10# engaged (each side) at [employer]  Paloff press x15B with 10# engaged at [employer]     therapeutic activities to improve functional performance for 14 minutes, including:  Session started on treadmill with forward walking x 8 minutes; incline level 2; speed setting 2.5  Carioca drill x 4 lengths x 35 feet each   Lateral monster walks in mini squat with green theraband x 4 lengths x 30 feet each    Patient Education and Home Exercises       Education provided:  Education on freezing and amplitude to minimize falls from festination. Educated to resume home exercise program.     Written Home Exercises Provided: yes. Exercises were reviewed and Claude was able to demonstrate them prior to the end of the session.  Claude demonstrated good  understanding of the education provided. See Electronic Medical Record under Patient Instructions for exercises provided during therapy sessions    Assessment     Claude arrived to session without complaints but reports two recent falls over the 2 weeks break from PT.  Both falls were usual circumstances, on grass and while playing golf.  Heavy education regarding the importance of implementing strategies that he learns in clinic as well as slowing down and focusing on purposeful movement. Good tolerance of session with moderate levels of fatigue and no adverse response.  Desired challenge level achieved with freemotion core exercises.  Only verbal cuing is for pacing, often to decrease speed and focus on accuracy.  He may possibly benefit from continued PT services to decrease fall frequency    Claude Is progressing well towards his goals.   Patient prognosis is Good.     Patient will continue to benefit from skilled outpatient physical therapy to address the deficits listed in the problem list box on initial evaluation, provide pt/family education and to maximize pt's level of " independence in the home and community environment.     Patient's spiritual, cultural and educational needs considered and pt agreeable to plan of care and goals.     Anticipated barriers to physical therapy: progressive nature of disease    Goals:  Long Term Goals = 6 weeks:  1. Pt will be independent with updated HEP supplement PT in improving functional mobility.  2. Patient will report no falls throughout history of plan of care. (Not met)  3. Patient will display the ability to lunge laterally to the right and pick object up from floor.  4. Pt will improve FOTO survey score to </= 28% limited in order to demo improved functional mobility (Met)  5. Patient will improve single leg balance on  the right lower extremity from 15 seconds to 30 seconds. (Met)    Updated Short Term Goals = 4 weeks  Patient will report daily performance of LSVT exercises in order to maximize therapeutic benefit     Updated Long Term Goals = 8 weeks  Patient will score >/=29/30 on FGA in order to improve dynamic postural control with community activities  Patient will report no falls over 4-week period in order to decrease risk for future injury and promote safe functional mobility  Patient will score >/=90% on FOTO survey in order to improve self perception of functional mobility    Plan     See updated plan of care; encourage safety when golfing. Continue balance training on unstable surfaces. Encourage better HEP compliance with already-established LSVT program.    Margarita Hernandes, PTA

## 2024-06-27 ENCOUNTER — CLINICAL SUPPORT (OUTPATIENT)
Dept: REHABILITATION | Facility: HOSPITAL | Age: 64
End: 2024-06-27
Payer: MEDICARE

## 2024-06-27 DIAGNOSIS — R26.81 UNSTEADY GAIT: Primary | ICD-10-CM

## 2024-06-27 DIAGNOSIS — R29.6 FALLS FREQUENTLY: ICD-10-CM

## 2024-06-27 DIAGNOSIS — G20.B1 PARKINSON'S DISEASE WITH DYSKINESIA WITHOUT FLUCTUATING MANIFESTATIONS: ICD-10-CM

## 2024-06-27 PROCEDURE — 97112 NEUROMUSCULAR REEDUCATION: CPT | Mod: KX,PN

## 2024-06-27 PROCEDURE — 97530 THERAPEUTIC ACTIVITIES: CPT | Mod: KX,PN

## 2024-06-27 NOTE — PROGRESS NOTES
[facility]   Physical Therapy Treatment Note / Discharge Summary     Name: Claude E. Escude  Clinic Number: [id number]    Therapy Diagnosis:   Encounter Diagnoses   Name Primary?    Unsteady gait Yes    Parkinson's disease with dyskinesia without fluctuating manifestations     Falls frequently      Physician: Evangelina Stone MD    Visit Date: 6/27/2024    Physician Orders: PT Eval and Treat   Medical Diagnosis from Referral:   G20.C (ICD-10-CM) - Parkinsonism, unspecified Parkinsonism type   R29.6 (ICD-10-CM) - Falls frequently   Evaluation Date: 4/9/2024  Authorization Period Expiration: 3/19/2025  Plan of Care Expiration: 7/18/2024  Progress Note: Last formally assessed on 5/23/2024  Visit # / Visits authorized: 8/20 (+eval)  FOTO: 2/3 completed     Precautions: Standard; falls     Time In: 9:04 AM  Time Out: 9:43 AM  Total Billable Time: 39 minutes (2TA + 1NMR) KX ALL MOVING FORWARD    PTA Visit #: 0/5     Subjective     Patient reports: No new falls since he last saw PT. Very frustrated with his golf game but understands he is objectively doing well in PT. Agreeable to discharge at this time.  He  was  compliant with home exercise program.  Response to previous treatment: no adverse response  Functional change: ongoing    Pain: 0/10  Location:  n/a     Objective      Objective Measures updated at progress report unless specified.     Functional Gait Assessment:     1. Gait on level surface =  3   (3) Normal: less than 5.5 sec, no A.D., no imbalance, normal gait pattern, deviates <6in   (2) Mild impairment: 7-5.6 sec, uses A.D., mild gait deviations, or deviates 6-10 in   (1) Moderate impairment: > 7 sec, slow speed, imbalance, deviates 10-15 in.   (0) Severe impairment: needs assist, deviates >15 in, reach/touch wall  2. Change in Gait Speed = 3   (3) Normal: smooth change w/o loss of balance or gait deviation, deviates < 6 in, significant difference between speeds   (2) Mild impairment:  changes speed, but demonstrates mild gait deviations, deviates 6-10 in, OR no deviations but unable to significantly speed, OR uses A.D.   (1) Moderate impairment: minor changes to speed, OR changes speed w/ significant deviations, deviates 10-15 in, OR  Changes speed , but loses balance & recovers   (0) Severe impairment: cannot change speed, deviates >15 in, or loses balance & needs assist  3. Gait with horizontal head turns  = 3   (3) Normal: no change in gait, deviates <6 in   (2) Mild impairment: slight change in speed, deviates 6-10 in, OR uses A.D.   (1) Moderate impairment: moderate change in speed, deviates 10-15 in   (0) Severe impairment: severe disruption of gait, deviates >15in  4. Gait with vertical head turns = 3   (3) Normal: no change in gait, deviates <6 in   (2) Mild impairment: slight change in speed, deviates 6-10 in OR uses A.D.   (1) Moderate impairment: moderate change in speed, deviates 10-15 in   (0) Severe impairment: severe disruption of gait, deviates >15 in  5. Gait with pivot turns = 3   (3) Normal: performs safely in 3 sec, no LOB   (2) Mild impairment: performs in >3 sec & no LOB, OR turns safely & requires several steps to regain LOB   (1) Moderate impairment: turns slow, OR requires several small steps for balance following turn & stop   (0) Severe impairment: cannot turn safely, needs assist  6. Step over obstacle = 3   (3) Normal: steps over 2 stacked boxes w/o change in speed or LOB   (2) Mild impairment: able to step over 1 box w/o change in speed or LOB   (1) Moderate impairment: steps over 1 box but must slow down, may require VC   (0) Severe impairment: cannot perform w/o assist  7. Gait with Narrow PAMELA = 3   (3) Normal: 10 steps no staggering   (2) Mild impairment: 7-9 steps   (1) Moderate impairment: 4-7 steps   (0) Severe impairment: < 4 steps or cannot perform w/o assist  8. Gait with eyes closed = 2   (3) Normal: < 7 sec, no A.D., no LOB, normal gait pattern, deviates  "<6 in   (2) Mild impairment: 7.1-9 sec, mild gait deviations, deviates 6-10 in   (1) Moderate impairment: > 9 sec, abnormal pattern, LOB, deviates 10-15 in   (0) Severe impairment: cannot perform w/o assist, LOB, deviates >15in  9. Ambulating Backwards = 3   (3) Normal: no A.D., no LOB, normal gait pattern, deviates <6in   (2) Mild impairment: uses A.D., slower speed, mild gait deviations, deviates 6-10 in   (1) Moderate impairment: slow speed, abnormal gait pattern, LOB, deviates 10-15 in   (0) Severe impairment: severe gait deviations or LOB, deviates >15in  10. Steps = 3   (3) Normal: alternating feet, no rail   (2) Mild Impairment: alternating feet, uses rail   (1) Moderate impairment: step-to, uses rail   (0) Severe impairment: cannot perform safely    Score 29/30     Cutoffs:   <22/30 fall risk in older adults  <18/30 fall risk in Parkinsons    MDC/MCID:  Stroke = 4.2 points (MDC)  Vestibular = 6 points (MDC)  Geriatric = 4 points (MCID)  Parkinson's = 4 points (MDC)    FOTO Degenerative CNS Disorders: 81%    Treatment     Claude received the treatments listed below:      therapeutic exercises to develop ROM and flexibility for 00 minutes including:     neuromuscular re-education activities to improve: Coordination for 15 minutes. The following activities were included:  Stepper bike x 6 minutes level 6.0 single peak for reciprocal limb coordination of all four extremities; large amplitude encouraged  Single leg stance + Paloff press with double green theraband x15B (HEP review)    therapeutic activities to improve functional performance for 24 minutes, including:  Discharge reassessment (see above) and goal review  Review of home exercise program (see Patient Instructions)  Patient education on fall risk mitigation strategies; anti-freezing strategies; importance of prioritizing accuracy and amplitude of movement versus "rushing" through tasks; suggest increase exercise days from 2 to 4 times weekly (whether " LSVT or combination of LSVT and other large amplitude movement drills)  Carioca drill x 2 lengths x 35 feet each     Patient Education and Home Exercises       Education provided:  Education on freezing and amplitude to minimize falls from festination. Educated to resume home exercise program.     Written Home Exercises Provided: yes. Exercises were reviewed and Claude was able to demonstrate them prior to the end of the session.  Claude demonstrated good  understanding of the education provided. See Electronic Medical Record under Patient Instructions for exercises provided during therapy sessions    Assessment     Objectively, Claude demonstrates very good postural control in clinic yet he continues to have falls on the golf course. PT had long discussion with patient on importance of implementing strategies learned in PT, even if he needs to be more mindful with tasks that were once automatic pre-diagnosis. Also suggested that patient consult with professional  regarding technique considering he is performing at ceiling of performance level in neurologic physical therapy. He has shown fairly good progress toward goals despite falls on gold course reported throughout his time in physical therapy. Self-perception of functional mobility has improved since initial evaluation per FOTO and balance has also improved per FGA. Encouraged patient to remain active but to increase frequency of large amplitude movement training which he continues to perform seldomly. Overall, patient has performed excellently in PT, but he will need to implement strategies learned in therapy in real-life scenarios in order to translate to reduced fall frequency.    Claude Is progressing well towards his goals.   Patient prognosis is Good.     Patient's spiritual, cultural and educational needs considered and pt agreeable to plan of care and goals.     Anticipated barriers to physical therapy: progressive nature of  disease    Goals:  Long Term Goals = 6 weeks:  1. Pt will be independent with updated HEP supplement PT in improving functional mobility. (Met)  2. Patient will report no falls throughout history of plan of care. (Not met)  3. Patient will display the ability to lunge laterally to the right and pick object up from floor. (Met)  4. Pt will improve FOTO survey score to </= 28% limited in order to demo improved functional mobility (Met)  5. Patient will improve single leg balance on  the right lower extremity from 15 seconds to 30 seconds. (Met)    Updated Short Term Goals = 4 weeks  Patient will report daily performance of LSVT exercises in order to maximize therapeutic benefit (Partially met; 2x/week)    Updated Long Term Goals = 8 weeks  Patient will score >/=29/30 on FGA in order to improve dynamic postural control with community activities (Met)  Patient will report no falls over 4-week period in order to decrease risk for future injury and promote safe functional mobility (Not met)  Patient will score >/=90% on FOTO survey in order to improve self perception of functional mobility (Progressing, not met)    Plan     Discharge PT    MURTAZA DE LA CRUZ, PT

## 2024-07-01 ENCOUNTER — OFFICE VISIT (OUTPATIENT)
Dept: PRIMARY CARE CLINIC | Facility: CLINIC | Age: 64
End: 2024-07-01
Payer: MEDICARE

## 2024-07-01 VITALS
HEIGHT: 70 IN | WEIGHT: 172.31 LBS | OXYGEN SATURATION: 97 % | HEART RATE: 101 BPM | SYSTOLIC BLOOD PRESSURE: 130 MMHG | DIASTOLIC BLOOD PRESSURE: 74 MMHG | BODY MASS INDEX: 24.67 KG/M2

## 2024-07-01 DIAGNOSIS — R79.9 ABNORMAL BLOOD CHEMISTRY: ICD-10-CM

## 2024-07-01 DIAGNOSIS — Z00.00 HEALTHCARE MAINTENANCE: ICD-10-CM

## 2024-07-01 DIAGNOSIS — G20.C PARKINSONISM, UNSPECIFIED PARKINSONISM TYPE: ICD-10-CM

## 2024-07-01 DIAGNOSIS — I10 ESSENTIAL HYPERTENSION: Primary | ICD-10-CM

## 2024-07-01 DIAGNOSIS — Z12.5 PROSTATE CANCER SCREENING: ICD-10-CM

## 2024-07-01 PROCEDURE — 99999 PR PBB SHADOW E&M-EST. PATIENT-LVL III: CPT | Mod: PBBFAC,,, | Performed by: INTERNAL MEDICINE

## 2024-07-01 PROCEDURE — 99214 OFFICE O/P EST MOD 30 MIN: CPT | Mod: S$PBB,,, | Performed by: INTERNAL MEDICINE

## 2024-07-01 PROCEDURE — 99213 OFFICE O/P EST LOW 20 MIN: CPT | Mod: PBBFAC,PN | Performed by: INTERNAL MEDICINE

## 2024-07-01 NOTE — PROGRESS NOTES
Subjective:       Patient ID: Claude E. Escude is a 64 y.o. male.    Chief Complaint: Hypertension      HPI  Claude E. Escude is a 64 y.o. male with hypertension, Parkinson's disease who presents today for Hypertension    Has been monitoring his blood pressure CVS and has been well controlled.  Last 1 was 125/72, heart rate in the 80s.  Denies lightheadedness, headaches, palpitations.  Lost a few pounds and watches his diet.  Stays active.    Continues on same Parkinson's medications.  Completed therapy which was finished before approved sessions as appears to have plateaued.  There is no tremor but has swollen a few times with loss of balance with turns.  But restraints in the most easy speech that continues to be fast and pressured, sometimes with trouble pronunciation or having others understand when he talks fast.  When he talks slower does well.  With strangers or new people he is able to talk calmly but with people he knows tends to talk fast.  Speech therapy will be trying device (Speechvive) hoping to help his speech.    Reports no new trouble swallowing but needs to chew longer and tends to eat slower.  Has occasional cough, which has been effective.          Health Maintenance:  Health Maintenance   Topic Date Due    Hepatitis C Screening  Never done    Shingles Vaccine (1 of 2) Never done    PROSTATE-SPECIFIC ANTIGEN  02/17/2024    Colorectal Cancer Screening  03/26/2025    Lipid Panel  03/15/2029    TETANUS VACCINE  08/17/2032       Review of Systems   Constitutional: Negative.  Negative for fever and unexpected weight change.   HENT: Negative.     Respiratory: Negative.     Cardiovascular: Negative.    Gastrointestinal: Negative.    Genitourinary:  Negative for difficulty urinating.   Musculoskeletal: Negative.    Integumentary:  Negative.   Neurological:  Positive for speech difficulty (pressured speech). Negative for tremors, weakness, headaches and memory loss.        Loss of balance    Psychiatric/Behavioral: Negative.  Negative for dysphoric mood (able to manage frustrations, especially when he sees other friends with other conditions, especially one with dementia.) and sleep disturbance.       Past Medical History:   Diagnosis Date    HTN (hypertension)     Parkinson's disease        Past Surgical History:   Procedure Laterality Date    JOINT REPLACEMENT         Family History   Problem Relation Name Age of Onset    Heart disease Mother      Hypertension Mother      Diabetes Mellitus Mother      Arthritis Mother      Prostate cancer Father  48    No Known Problems Sister      Bone cancer Brother  43    Heart attack Brother  46        smoked    Heart disease Maternal Grandmother      Heart disease Maternal Grandfather      Heart disease Paternal Grandmother      Heart disease Paternal Grandfather         Social History     Socioeconomic History    Marital status:    Tobacco Use    Smoking status: Never    Smokeless tobacco: Never   Substance and Sexual Activity    Alcohol use: Never    Drug use: Never    Sexual activity: Yes     Partners: Female     Social Determinants of Health     Financial Resource Strain: Low Risk  (1/16/2024)    Overall Financial Resource Strain (CARDIA)     Difficulty of Paying Living Expenses: Not very hard   Food Insecurity: No Food Insecurity (1/16/2024)    Hunger Vital Sign     Worried About Running Out of Food in the Last Year: Never true     Ran Out of Food in the Last Year: Never true   Transportation Needs: No Transportation Needs (1/16/2024)    PRAPARE - Transportation     Lack of Transportation (Medical): No     Lack of Transportation (Non-Medical): No   Physical Activity: Sufficiently Active (1/16/2024)    Exercise Vital Sign     Days of Exercise per Week: 5 days     Minutes of Exercise per Session: 60 min   Stress: No Stress Concern Present (1/16/2024)    [employer] [place] [employer] - Occupational Stress Questionnaire     Feeling of Stress  ": Only a little   Housing Stability: Low Risk  (1/16/2024)    Housing Stability Vital Sign     Unable to Pay for Housing in the Last Year: No     Number of Places Lived in the Last Year: 1     Unstable Housing in the Last Year: No       Current Outpatient Medications   Medication Sig Dispense Refill    carbidopa-levodopa  mg (SINEMET)  mg per tablet Take 2 tablets by mouth 3 (three) times daily. 540 tablet 3    hydroCHLOROthiazide (HYDRODIURIL) 12.5 MG Tab Take 1 tablet (12.5 mg total) by mouth once daily. 30 tablet 11    irbesartan (AVAPRO) 150 MG tablet Take 1 tablet (150 mg total) by mouth 2 (two) times a day. 60 tablet 3    meloxicam (MOBIC) 7.5 MG tablet Take 7.5 mg by mouth once daily.      selegiline HCl (ELDEPRYL) 5 mg tablet Take 1 tablet (5 mg total) by mouth 2 (two) times daily with meals. 180 tablet 3    sildenafiL (VIAGRA) 100 MG tablet Take 1 tablet (100 mg total) by mouth daily as needed for Erectile Dysfunction. 30 tablet 11     No current facility-administered medications for this visit.       Review of patient's allergies indicates:  No Known Allergies      Objective:       Last 3 sets of Vitals        4/15/2024     9:06 AM 4/19/2024     9:09 AM 7/1/2024     2:02 PM   Vitals - 1 value per visit   SYSTOLIC 146 140 130   DIASTOLIC 83 90 74   Pulse 70 83 101   SPO2  98 % 97 %   Weight (lb) 175 172.07 172.29   Weight (kg) 79.379 78.05 78.15   Height 5' 10" (1.778 m)  5' 10" (1.778 m)   BMI (Calculated) 25.1  24.7   Pain Score Zero Zero Zero   Physical Exam  Constitutional:       General: He is not in acute distress.     Appearance: Normal appearance.   HENT:      Head: Normocephalic.   Eyes:      General: No scleral icterus.     Extraocular Movements: Extraocular movements intact.      Conjunctiva/sclera: Conjunctivae normal.   Neck:      Comments: No goiter.  Cardiovascular:      Rate and Rhythm: Normal rate and regular rhythm.      Pulses: Normal pulses.      Heart sounds: Normal heart " sounds.   Pulmonary:      Effort: Pulmonary effort is normal.      Breath sounds: Normal breath sounds.   Abdominal:      General: Bowel sounds are normal. There is no distension.      Palpations: Abdomen is soft. There is no mass.      Tenderness: There is no abdominal tenderness.   Musculoskeletal:         General: No swelling. Normal range of motion.   Lymphadenopathy:      Cervical: No cervical adenopathy.   Skin:     General: Skin is warm and dry.   Neurological:      General: No focal deficit present.      Mental Status: He is alert and oriented to person, place, and time.      Comments: No tremor noted or loss of balance.  Holds on to furniture when turning to sit.  Speech tends to be fast with periods of normal pace.   Psychiatric:         Behavior: Behavior normal.      Comments: Watery eyes when talking about friends.           CBC:  Recent Labs   Lab 02/01/23  1110 02/17/23  0825 03/15/24  0838   WBC 4.84 4.84 4.80   RBC 4.70 4.79 4.96   Hemoglobin 14.7 15.0 15.6   Hematocrit 43.3 44.0 46.0   Platelets 178 189 194   MCV 92 92 93   MCH 31.3 H 31.3 H 31.5 H   MCHC 33.9 34.1 33.9     CMP:  Recent Labs   Lab 08/24/22  0834 08/24/22  0834 02/17/23  0826 03/15/24  0838   Glucose 104 H  --  110 109   Calcium 9.3  --  9.3 9.5   Albumin 4.5  --  4.2 4.3   Total Protein 6.4  --  6.7 6.6   Sodium 143  --  140 140   Potassium 4.1  --  4.2 4.7   CO2 29  --  26 27   Chloride 107  --  106 105   BUN 16  --  11 13   Creatinine 0.85  --  1.0 0.9   Alkaline Phosphatase  --    < > 56 71   ALT 13  --  <5 L <5 L   AST 13  --  15 13   Total Bilirubin 0.8  --  1.0 1.2 H    < > = values in this interval not displayed.     URINALYSIS:       LIPIDS:  Recent Labs   Lab 08/24/22  0834 02/17/23  0825 02/17/23  0826 03/15/24  0838   TSH 2.40 1.215  --  1.254   HDL 51  --  49 54   Cholesterol 159  --  150 146   Triglycerides 69  --  69 66   LDL Cholesterol 92  --  87.2 78.8   HDL/Cholesterol Ratio 3.1  --  32.7 37.0   Non-HDL  Cholesterol  --   --  101 92   Non HDL Chol. (LDL+VLDL) 108  --   --   --    Total Cholesterol/HDL Ratio  --   --  3.1 2.7     TSH:  Recent Labs   Lab 08/24/22  0834 02/17/23  0825 03/15/24  0838   TSH 2.40 1.215 1.254       A1C:  Recent Labs   Lab 08/24/22  0834 02/17/23  0825 03/15/24  0838   Hemoglobin A1C 5.4 5.4 5.5       Imaging:  Fl Modified Barium Swallow Speech  Narrative: EXAMINATION:  FL MODIFIED BARIUM SWALLOW SPEECH STUDY    CLINICAL HISTORY:  Dysphagia, oropharyngeal phase    TECHNIQUE:  Conjunction with speech pathology, the patient ingested barium of varying consistencies to include thin, pudding, barium coated peaches, and cracker.    Fluoroscopy time: 2.8 minutes.    COMPARISON:  None.    FINDINGS:  Mild weakened pharyngeal constriction across tested consistencies with mild pharyngeal residue.  Two instances of purposeful oropharyngeal regurgitation and re-swallow with barium coated cracker.  No convincing laryngeal penetration or tracheal aspiration.  Some sticking of the barium tablet within the oral cavity on initial swallow attempt.  The pill was swallowed without difficulty on subsequent swallow attempt.  Impression: As above.    Please see separate speech pathology report further detail.    Electronically signed by: Julien Ackerman  Date:    01/26/2022  Time:    11:40      Assessment:       1. Essential hypertension    2. Parkinsonism, unspecified Parkinsonism type    3. Abnormal blood chemistry    4. Prostate cancer screening    5. Healthcare maintenance            Plan:       1. Essential hypertension  Overview:  On losartan 150 mg twice a day and hydrochlorothiazide.    Assessment & Plan:  Blood pressure controlled.  Continue same treatment.    Orders:  -     CBC Auto Differential; Future; Expected date: 07/01/2024  -     Comprehensive Metabolic Panel; Future; Expected date: 07/01/2024  -     Lipid Panel; Future; Expected date: 07/01/2024    2. Parkinsonism, unspecified Parkinsonism  type  Overview:  Carbidopa levodopa  mg 2 tablets 3 times a day.  Selegiline 5 mg twice a day.    Assessment & Plan:  Completed physical therapy as appear to have stabilized.  Speech therapy will be trying device to help his speech.  Has had occasional falls due to loss of balance and notices needs to be careful with turns.  Slower pace walking and speech helps.  Follows with Neurology.          3. Abnormal blood chemistry  -     Hemoglobin A1C; Future; Expected date: 07/01/2024    4. Prostate cancer screening  -     PSA, Screening; Future; Expected date: 07/01/2024    5. Healthcare maintenance  Assessment & Plan:  - Yearly labs- done on 03/15/2024  - Colon cancer screening- fit kit 3/26/24, negative.  - ACP- Has HPOA in chart.  - Vaccination- due for shingles, RSV, flu, COVID booster, and pneumonia.         Health Maintenance Due   Topic Date Due    Hepatitis C Screening  Never done    Shingles Vaccine (1 of 2) Never done    RSV Vaccine (Age 60+ and Pregnant patients) (1 - 1-dose 60+ series) Never done    Pneumococcal Vaccines (Age 65+) (1 of 1 - PCV) Never done    COVID-19 Vaccine (3 - 2023-24 season) 09/01/2023    PROSTATE-SPECIFIC ANTIGEN  02/17/2024        I spent a total of 35 minutes on the day of the visit.This includes face to face time and non-face to face time preparing to see the patient (eg, review of tests), obtaining and/or reviewing separately obtained history, documenting clinical information in the electronic or other health record, independently interpreting results and communicating results to the patient/family/caregiver, or care coordinator.       Return to clinic in 6 months.  WS 1    Evangelina Stone MD  [facility]  Disclaimer:  This note has been generated using voice-recognition software. There may be grammatical or spelling errors that have been missed during proof-reading

## 2024-07-02 ENCOUNTER — TELEPHONE (OUTPATIENT)
Dept: NEUROLOGY | Facility: CLINIC | Age: 64
End: 2024-07-02
Payer: MEDICARE

## 2024-07-02 ENCOUNTER — PATIENT MESSAGE (OUTPATIENT)
Dept: NEUROLOGY | Facility: CLINIC | Age: 64
End: 2024-07-02
Payer: MEDICARE

## 2024-07-02 NOTE — ASSESSMENT & PLAN NOTE
Completed physical therapy as appear to have stabilized.  Speech therapy will be trying device to help his speech.  Has had occasional falls due to loss of balance and notices needs to be careful with turns.  Slower pace walking and speech helps.  Follows with Neurology.

## 2024-07-02 NOTE — ASSESSMENT & PLAN NOTE
- Yearly labs- done on 03/15/2024  - Colon cancer screening- fit kit 3/26/24, negative.  - ACP- Has HPOA in chart.  - Vaccination- due for shingles, RSV, flu, COVID booster, and pneumonia.

## 2024-07-03 ENCOUNTER — PATIENT MESSAGE (OUTPATIENT)
Dept: PRIMARY CARE CLINIC | Facility: CLINIC | Age: 64
End: 2024-07-03
Payer: MEDICARE

## 2024-07-23 ENCOUNTER — PATIENT MESSAGE (OUTPATIENT)
Dept: NEUROLOGY | Facility: CLINIC | Age: 64
End: 2024-07-23
Payer: MEDICARE

## 2024-07-31 ENCOUNTER — TELEPHONE (OUTPATIENT)
Dept: REHABILITATION | Facility: HOSPITAL | Age: 64
End: 2024-07-31
Payer: MEDICARE

## 2024-07-31 NOTE — TELEPHONE ENCOUNTER
Attempted to call Svetlana Claude to see if there is anything else needed from [employer] for the Speech Vive device he is trying to obtain. Unable to leave voicemail.

## 2024-08-21 ENCOUNTER — PATIENT MESSAGE (OUTPATIENT)
Dept: PRIMARY CARE CLINIC | Facility: CLINIC | Age: 64
End: 2024-08-21
Payer: MEDICARE

## 2024-08-21 DIAGNOSIS — I10 ESSENTIAL HYPERTENSION: ICD-10-CM

## 2024-08-22 RX ORDER — IRBESARTAN 150 MG/1
150 TABLET ORAL 2 TIMES DAILY
Qty: 180 TABLET | Refills: 3 | Status: SHIPPED | OUTPATIENT
Start: 2024-08-22 | End: 2025-08-22

## 2024-09-30 PROBLEM — Z00.00 HEALTHCARE MAINTENANCE: Status: RESOLVED | Noted: 2024-01-23 | Resolved: 2024-09-30

## 2024-09-30 RX ORDER — SILDENAFIL 100 MG/1
100 TABLET, FILM COATED ORAL DAILY PRN
Qty: 30 TABLET | Refills: 11 | Status: SHIPPED | OUTPATIENT
Start: 2024-09-30 | End: 2025-09-30

## 2024-10-09 ENCOUNTER — PATIENT MESSAGE (OUTPATIENT)
Dept: NEUROLOGY | Facility: CLINIC | Age: 64
End: 2024-10-09
Payer: MEDICARE

## 2024-10-21 ENCOUNTER — OFFICE VISIT (OUTPATIENT)
Dept: NEUROLOGY | Facility: CLINIC | Age: 64
End: 2024-10-21
Payer: MEDICARE

## 2024-10-21 VITALS
WEIGHT: 167 LBS | HEART RATE: 76 BPM | DIASTOLIC BLOOD PRESSURE: 80 MMHG | SYSTOLIC BLOOD PRESSURE: 129 MMHG | BODY MASS INDEX: 23.96 KG/M2

## 2024-10-21 DIAGNOSIS — Z71.89 COUNSELING REGARDING GOALS OF CARE: ICD-10-CM

## 2024-10-21 DIAGNOSIS — R47.89 RAPID RATE OF SPEECH: ICD-10-CM

## 2024-10-21 DIAGNOSIS — I10 ESSENTIAL HYPERTENSION: ICD-10-CM

## 2024-10-21 DIAGNOSIS — R47.1 DYSARTHRIA: ICD-10-CM

## 2024-10-21 DIAGNOSIS — R26.81 UNSTEADY GAIT: ICD-10-CM

## 2024-10-21 DIAGNOSIS — R26.81 GAIT INSTABILITY: ICD-10-CM

## 2024-10-21 DIAGNOSIS — G20.B1 PARKINSON'S DISEASE WITH DYSKINESIA WITHOUT FLUCTUATING MANIFESTATIONS: Primary | ICD-10-CM

## 2024-10-21 PROCEDURE — 99214 OFFICE O/P EST MOD 30 MIN: CPT | Mod: S$PBB,,, | Performed by: PHYSICIAN ASSISTANT

## 2024-10-21 PROCEDURE — G2211 COMPLEX E/M VISIT ADD ON: HCPCS | Mod: S$PBB,,, | Performed by: PHYSICIAN ASSISTANT

## 2024-10-21 PROCEDURE — 99213 OFFICE O/P EST LOW 20 MIN: CPT | Mod: PBBFAC | Performed by: PHYSICIAN ASSISTANT

## 2024-10-21 PROCEDURE — 99999 PR PBB SHADOW E&M-EST. PATIENT-LVL III: CPT | Mod: PBBFAC,,, | Performed by: PHYSICIAN ASSISTANT
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
## 2024-10-24 ENCOUNTER — PATIENT MESSAGE (OUTPATIENT)
Dept: NEUROLOGY | Facility: CLINIC | Age: 64
End: 2024-10-24
Payer: MEDICARE

## 2024-10-29 DIAGNOSIS — Z00.00 ENCOUNTER FOR MEDICARE ANNUAL WELLNESS EXAM: ICD-10-CM

## 2024-10-30 ENCOUNTER — HOSPITAL ENCOUNTER (OUTPATIENT)
Dept: RADIOLOGY | Facility: HOSPITAL | Age: 64
Discharge: HOME | End: 2024-10-30
Attending: PHYSICIAN ASSISTANT
Payer: MEDICARE

## 2024-10-30 DIAGNOSIS — G20.B1 PARKINSON'S DISEASE WITH DYSKINESIA WITHOUT FLUCTUATING MANIFESTATIONS: ICD-10-CM

## 2024-10-30 PROCEDURE — 70551 MRI BRAIN STEM W/O DYE: CPT | Mod: TC

## 2024-10-30 PROCEDURE — 70551 MRI BRAIN STEM W/O DYE: CPT | Mod: 26,,, | Performed by: RADIOLOGY

## 2024-11-13 ENCOUNTER — PATIENT MESSAGE (OUTPATIENT)
Dept: PRIMARY CARE CLINIC | Facility: CLINIC | Age: 64
End: 2024-11-13
Payer: MEDICARE

## 2024-11-18 ENCOUNTER — PATIENT MESSAGE (OUTPATIENT)
Dept: PRIMARY CARE CLINIC | Facility: CLINIC | Age: 64
End: 2024-11-18
Payer: MEDICARE

## 2024-11-26 ENCOUNTER — HOSPITAL ENCOUNTER (EMERGENCY)
Facility: HOSPITAL | Age: 64
Discharge: HOME | End: 2024-11-26
Attending: EMERGENCY MEDICINE
Payer: MEDICARE

## 2024-11-26 VITALS
HEIGHT: 70 IN | HEART RATE: 90 BPM | TEMPERATURE: 98 F | OXYGEN SATURATION: 100 % | DIASTOLIC BLOOD PRESSURE: 85 MMHG | RESPIRATION RATE: 20 BRPM | WEIGHT: 168 LBS | BODY MASS INDEX: 24.05 KG/M2 | SYSTOLIC BLOOD PRESSURE: 178 MMHG

## 2024-11-26 DIAGNOSIS — S50.812A ABRASION OF LEFT FOREARM, INITIAL ENCOUNTER: Primary | ICD-10-CM

## 2024-11-26 PROCEDURE — 99282 EMERGENCY DEPT VISIT SF MDM: CPT

## 2024-11-26 PROCEDURE — 25000003 PHARM REV CODE 250: Performed by: EMERGENCY MEDICINE

## 2024-11-26 RX ADMIN — BACITRACIN ZINC, NEOMYCIN, POLYMYXIN B 1 EACH: 400; 3.5; 5 OINTMENT TOPICAL at 03:11
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
## 2024-12-10 ENCOUNTER — PATIENT MESSAGE (OUTPATIENT)
Dept: NEUROLOGY | Facility: CLINIC | Age: 64
End: 2024-12-10
Payer: MEDICARE

## 2024-12-10 DIAGNOSIS — G20.B1 PARKINSON'S DISEASE WITH DYSKINESIA WITHOUT FLUCTUATING MANIFESTATIONS: ICD-10-CM

## 2024-12-10 DIAGNOSIS — R47.89 RAPID RATE OF SPEECH: ICD-10-CM

## 2024-12-10 DIAGNOSIS — R47.1 DYSARTHRIA: Primary | ICD-10-CM

## 2024-12-19 ENCOUNTER — LAB VISIT (OUTPATIENT)
Dept: LAB | Facility: HOSPITAL | Age: 64
End: 2024-12-19
Attending: INTERNAL MEDICINE
Payer: MEDICARE

## 2024-12-19 DIAGNOSIS — Z12.5 PROSTATE CANCER SCREENING: ICD-10-CM

## 2024-12-19 DIAGNOSIS — R79.9 ABNORMAL BLOOD CHEMISTRY: ICD-10-CM

## 2024-12-19 DIAGNOSIS — I10 ESSENTIAL HYPERTENSION: ICD-10-CM

## 2024-12-19 LAB
ALBUMIN SERPL BCP-MCNC: 4.4 G/DL (ref 3.5–5.2)
ALP SERPL-CCNC: 63 U/L (ref 40–150)
ALT SERPL W/O P-5'-P-CCNC: 11 U/L (ref 10–44)
ANION GAP SERPL CALC-SCNC: 10 MMOL/L (ref 8–16)
AST SERPL-CCNC: 14 U/L (ref 10–40)
BASOPHILS # BLD AUTO: 0.02 K/UL (ref 0–0.2)
BASOPHILS NFR BLD: 0.2 % (ref 0–1.9)
BILIRUB SERPL-MCNC: 1.5 MG/DL (ref 0.1–1)
BUN SERPL-MCNC: 19 MG/DL (ref 8–23)
CALCIUM SERPL-MCNC: 9.8 MG/DL (ref 8.7–10.5)
CHLORIDE SERPL-SCNC: 101 MMOL/L (ref 95–110)
CHOLEST SERPL-MCNC: 142 MG/DL (ref 120–199)
CHOLEST/HDLC SERPL: 2.9 {RATIO} (ref 2–5)
CO2 SERPL-SCNC: 27 MMOL/L (ref 23–29)
COMPLEXED PSA SERPL-MCNC: 0.85 NG/ML (ref 0–4)
CREAT SERPL-MCNC: 1 MG/DL (ref 0.5–1.4)
DIFFERENTIAL METHOD BLD: ABNORMAL
EOSINOPHIL # BLD AUTO: 0 K/UL (ref 0–0.5)
EOSINOPHIL NFR BLD: 0 % (ref 0–8)
ERYTHROCYTE [DISTWIDTH] IN BLOOD BY AUTOMATED COUNT: 11.9 % (ref 11.5–14.5)
EST. GFR  (NO RACE VARIABLE): >60 ML/MIN/1.73 M^2
ESTIMATED AVG GLUCOSE: 117 MG/DL (ref 68–131)
GLUCOSE SERPL-MCNC: 135 MG/DL (ref 70–110)
HBA1C MFR BLD: 5.7 % (ref 4–5.6)
HCT VFR BLD AUTO: 47.9 % (ref 40–54)
HDLC SERPL-MCNC: 49 MG/DL (ref 40–75)
HDLC SERPL: 34.5 % (ref 20–50)
HGB BLD-MCNC: 16.1 G/DL (ref 14–18)
IMM GRANULOCYTES # BLD AUTO: 0.03 K/UL (ref 0–0.04)
IMM GRANULOCYTES NFR BLD AUTO: 0.3 % (ref 0–0.5)
LDLC SERPL CALC-MCNC: 80.4 MG/DL (ref 63–159)
LYMPHOCYTES # BLD AUTO: 0.4 K/UL (ref 1–4.8)
LYMPHOCYTES NFR BLD: 3.6 % (ref 18–48)
MCH RBC QN AUTO: 31.1 PG (ref 27–31)
MCHC RBC AUTO-ENTMCNC: 33.6 G/DL (ref 32–36)
MCV RBC AUTO: 93 FL (ref 82–98)
MONOCYTES # BLD AUTO: 0.4 K/UL (ref 0.3–1)
MONOCYTES NFR BLD: 4.3 % (ref 4–15)
NEUTROPHILS # BLD AUTO: 8.9 K/UL (ref 1.8–7.7)
NEUTROPHILS NFR BLD: 91.6 % (ref 38–73)
NONHDLC SERPL-MCNC: 93 MG/DL
NRBC BLD-RTO: 0 /100 WBC
PLATELET # BLD AUTO: 214 K/UL (ref 150–450)
PMV BLD AUTO: 10.6 FL (ref 9.2–12.9)
POTASSIUM SERPL-SCNC: 4.1 MMOL/L (ref 3.5–5.1)
PROT SERPL-MCNC: 7.1 G/DL (ref 6–8.4)
RBC # BLD AUTO: 5.18 M/UL (ref 4.6–6.2)
SODIUM SERPL-SCNC: 138 MMOL/L (ref 136–145)
TRIGL SERPL-MCNC: 63 MG/DL (ref 30–150)
WBC # BLD AUTO: 9.69 K/UL (ref 3.9–12.7)

## 2024-12-19 PROCEDURE — 83036 HEMOGLOBIN GLYCOSYLATED A1C: CPT | Performed by: INTERNAL MEDICINE

## 2024-12-19 PROCEDURE — 80061 LIPID PANEL: CPT | Performed by: INTERNAL MEDICINE

## 2024-12-19 PROCEDURE — 84153 ASSAY OF PSA TOTAL: CPT | Performed by: INTERNAL MEDICINE

## 2024-12-19 PROCEDURE — 80053 COMPREHEN METABOLIC PANEL: CPT | Performed by: INTERNAL MEDICINE

## 2024-12-19 PROCEDURE — 85025 COMPLETE CBC W/AUTO DIFF WBC: CPT | Performed by: INTERNAL MEDICINE

## 2024-12-19 PROCEDURE — 36415 COLL VENOUS BLD VENIPUNCTURE: CPT | Performed by: INTERNAL MEDICINE

## 2024-12-23 ENCOUNTER — OFFICE VISIT (OUTPATIENT)
Dept: PRIMARY CARE CLINIC | Facility: CLINIC | Age: 64
End: 2024-12-23
Payer: MEDICARE

## 2024-12-23 VITALS
OXYGEN SATURATION: 97 % | WEIGHT: 169.88 LBS | BODY MASS INDEX: 24.32 KG/M2 | DIASTOLIC BLOOD PRESSURE: 82 MMHG | SYSTOLIC BLOOD PRESSURE: 134 MMHG | HEART RATE: 83 BPM | HEIGHT: 70 IN

## 2024-12-23 DIAGNOSIS — I10 ESSENTIAL HYPERTENSION: ICD-10-CM

## 2024-12-23 DIAGNOSIS — E55.9 VITAMIN D DEFICIENCY: ICD-10-CM

## 2024-12-23 DIAGNOSIS — Z00.00 HEALTHCARE MAINTENANCE: ICD-10-CM

## 2024-12-23 DIAGNOSIS — R73.03 PREDIABETES: ICD-10-CM

## 2024-12-23 DIAGNOSIS — G20.C PARKINSONISM, UNSPECIFIED PARKINSONISM TYPE: Primary | ICD-10-CM

## 2024-12-23 DIAGNOSIS — E78.49 OTHER HYPERLIPIDEMIA: ICD-10-CM

## 2024-12-23 DIAGNOSIS — R79.9 ABNORMAL BLOOD CHEMISTRY: ICD-10-CM

## 2024-12-23 PROCEDURE — 99213 OFFICE O/P EST LOW 20 MIN: CPT | Mod: PBBFAC,PN | Performed by: INTERNAL MEDICINE

## 2024-12-23 PROCEDURE — 99215 OFFICE O/P EST HI 40 MIN: CPT | Mod: S$PBB,,, | Performed by: INTERNAL MEDICINE

## 2024-12-23 PROCEDURE — 99999 PR PBB SHADOW E&M-EST. PATIENT-LVL III: CPT | Mod: PBBFAC,,, | Performed by: INTERNAL MEDICINE

## 2024-12-23 RX ORDER — CHOLECALCIFEROL (VITAMIN D3) 25 MCG
1000 TABLET ORAL DAILY
Qty: 30 TABLET | Refills: 11 | Status: SHIPPED | OUTPATIENT
Start: 2024-12-23

## 2024-12-24 PROBLEM — E78.49 OTHER HYPERLIPIDEMIA: Status: ACTIVE | Noted: 2024-12-24

## 2024-12-24 PROBLEM — E55.9 VITAMIN D DEFICIENCY: Status: ACTIVE | Noted: 2024-12-24

## 2024-12-24 PROBLEM — R73.03 PREDIABETES: Status: ACTIVE | Noted: 2024-12-24

## 2024-12-25 NOTE — ASSESSMENT & PLAN NOTE
- Assessed pre-diabetes status based on recent glucose level of 135 and A1C of 5.7  - Recommend reducing consumption of refined carbs and concentrated sweets.  - Claude to choose fruit instead of sugary snacks when craving something sweet.  - Claude to decrease frozen coffee consumption.

## 2024-12-25 NOTE — ASSESSMENT & PLAN NOTE
- Continued Irbesartan 150 mg twice daily, hydrochlorothiazide in the morning.  - Explained updated cholesterol guidelines for patients with elevated blood pressure, aiming for LDL less than 70.  - Claude to monitor for shortness of breath during physical activities and report any persistent issues. Possible allergic as only occurs with gardening. Exercise stress test to be considered if present with exertions.  - Noting clear lung sounds and good oxygen levels  - Recommend increasing physical activity by alternating between walking and stationary bike riding.  - Claude to gradually increase exercise duration, starting with 2-3 mile walks.

## 2024-12-25 NOTE — ASSESSMENT & PLAN NOTE
- LDL was 80 mg/dl, goal with HTN is <70 mg/dl.  - Wants to try optimizing his diet and avoid starting another medication.  - Evaluated cholesterol management considering intermediate cardiovascular risk and family history  - Consider calcium score CT scan.

## 2024-12-25 NOTE — ASSESSMENT & PLAN NOTE
- Continued carbidopa levodopa 25/250 mg 6 times daily.  - Assessed frequent falls and rapid speech, potentially related to Parkinson's disease. Speech therapy reordered by neurology.  - Contact the office if falls become more frequent or severe.

## 2024-12-25 NOTE — ASSESSMENT & PLAN NOTE
- Recommend reducing consumption of refined carbs and concentrated sweets.  - Claude to choose fruit instead of sugary snacks when craving something sweet.  - Claude to decrease frozen coffee consumption.
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
## 2025-01-06 ENCOUNTER — CLINICAL SUPPORT (OUTPATIENT)
Dept: REHABILITATION | Facility: HOSPITAL | Age: 65
End: 2025-01-06
Payer: MEDICARE

## 2025-01-06 DIAGNOSIS — R47.1 DYSARTHRIA: Primary | ICD-10-CM

## 2025-01-06 DIAGNOSIS — R49.8 HYPOPHONIA: ICD-10-CM

## 2025-01-06 PROCEDURE — 92507 TX SP LANG VOICE COMM INDIV: CPT | Mod: PN

## 2025-01-06 PROCEDURE — 92522 EVALUATE SPEECH PRODUCTION: CPT | Mod: PN

## 2025-01-06 NOTE — PATIENT INSTRUCTIONS
"Dysarthria    What is dysarthria?    Dysarthria is a motor speech disorder. It results from impaired movement of the muscles used for speech production, including the lips, tongue, vocal folds, and/or diaphragm. The type and severity of dysarthria depend on which area of the nervous system is affected.    What are some signs or symptoms of dysarthria?    A person with dysarthria may demonstrate the following speech characteristics:    "Slurred," "choppy," or "mumbled" speech that may be difficult to understand  Slow rate of speech  Rapid rate of speech with a "mumbling" quality  Limited tongue, lip, and jaw movement  Abnormal pitch and rhythm when speaking  Changes in voice quality, such as hoarse or breathy voice or speech that sounds "nasal" or "stuffy"    What causes dysarthria?    Dysarthria is caused by damage to the brain. This may occur at birth, as in cerebral palsy or muscular dystrophy, or may occur later in life due to one of many different conditions that involve the nervous system, including    stroke,  brain injury,  tumors,  Parkinson's disease,  Tara Gehrig's disease/amyotrophic lateral sclerosis (ALS),  Milton's disease,  multiple sclerosis.  How common is dysarthria?    There are no known data about the incidence of dysarthria in the general population, because of the broad variety of possible causes.    What are the types of dysarthria?    Find an explanation and definitions of the many types of dysarthria online at [employer].    How is dysarthria diagnosed?    A speech-language pathologist (SLP) can evaluate a person with speech difficulties and determine the nature and severity of the problem. The SLP will look at movement of the lips, tongue, and face, as well as breath support for speech and voice quality. The assessment will also include an examination of speech production in a variety of contexts.    What treatment is available for people with " dysarthria?    Treatment depends on the cause, type, and severity of the symptoms. An SLP works with the individual to improve communication abilities. Some possible goals of treatment include:    Slowing the rate of speech  Improving the breath support so the person can speak more loudly  Strengthening muscles  Increasing tongue and lip movement  Improving speech sound production so that speech is more clear  Teaching caregivers, family members, and teachers strategies to better communicate with the person with dysarthria  In severe cases, learning to use alternative means of communication (e.g., simple gestures, alphabet boards, or electronic or computer-based equipment)  How effective are speech-language pathology treatments for dysarthria?    [employer] produced a treatment efficacy summary on dysarthria [PDF] that describes evidence about how well treatment works. This summary is useful not only to individuals with dysarthria and their caregivers but also to insurance companies considering payment for much needed services for dysarthria.    What can I do to communicate better with a person with dysarthria?    It is important for both the person with dysarthria and the people he or she communicates with to work together to improve interactions. Here are some tips for both speaker and listener.    Tips for the Person With Dysarthria    Introduce your topic with a single word or short phrase before beginning to speak in more complete sentences.  Check with the listeners to make sure that they understand you.  Speak slowly and loudly and pause frequently.  Try to limit conversations when you feel tired--when your speech will be harder to understand.  If you become frustrated, try to use other methods, such as pointing or gesturing, to get your message across or take a rest and try again later.  Children may need additional help to remember to use these strategies.    Tips for the Listener    Reduce distractions and  background noise.  Pay attention to the speaker.  Watch the person as he or she talks.  Let the speaker know when you have difficulty understanding him or her.  Repeat only the part of the message that you understood so that the speaker does not have to repeat the entire message.  If you still don't understand the message, ask yes/no questions or have the speaker write his or her message to you.    What other organizations have information on dysarthria and services for people with dysarthria?    This list is not exhaustive and inclusion does not imply endorsement of the organization or content of the Web site by [facility].    [facility] [email or web address] services access numbers  [employer]      Reference: Dysarthria. [employer], [employer], www.quan.org/public/speech/disorders/dysarthria/.

## 2025-01-06 NOTE — PLAN OF CARE
[facility]  Speech Therapy Evaluation -Neurological Rehabilitation    Date: 1/6/2025     Name: Claude E. Escude   MRN: [id number]    Therapy Diagnosis:   Encounter Diagnoses   Name Primary?    Dysarthria Yes    Hypophonia     Physician: Amirah Smith P*  Physician Orders: Ambulatory Referral to Speech Therapy   Medical Diagnosis: R47.1 (ICD-10-CM) - Dysarthria G20.B1 (ICD-10-CM) - Parkinson's disease with dyskinesia without fluctuating manifestations R47.89 (ICD-10-CM) - Rapid rate of speech    Visit # / Visits Authorized:  1 / 1   Date of Evaluation:  1/6/2025   Insurance Authorization Period: 12/17/24 to 12/17/25  Plan of Care Certification:    1/6/2025 to 2/17/2025      Time In:1203    Time Out: 1246   Total time: 43 minutes    Procedure   Evaluation of Speech Sound Production   Speech- Language- Voice Therapy     Precautions: Fall and Communication  Subjective   Date of Onset: PD diagnosis in 2022  History of Current Condition:  Claude E. Escude is a 64 y.o. male who presents to [facility] Outpatient Speech Therapy for evaluation secondary to dysarthria due to Parkinson's disease. Patient was referred to therapy by Amirah Smith P* , which is the patient's neurologist. Patient reports speech function worsening and swallowing is slightly more affected. Lost approximately 3-4 pounds in the last 2 months. Denies choking events. He reports frequently stuttering and decreased intelligibility, decreased volume. Similar complaints to previous evaluation/treatment but feels everything has been worsening.  Past Medical History: Claude E. Escude  has a past medical history of HTN (hypertension) and Parkinson's disease.  Claude E. Escude  has a past surgical history that includes Joint replacement.  Medical Hx and Allergies: Claude has a current medication list which includes the following prescription(s): carbidopa-levodopa  mg, hydrochlorothiazide, irbesartan,  "selegiline hcl, sildenafil, and vitamin d. Review of patient's allergies indicates:  No Known Allergies  Prior Therapy:  ST in outpatient clinic 9/2022-10/2022; 8/2023-9/2023; 1/2024-5/2024; evaluation only 9/2024 focusing on various dysarthria treatments. He obtained the Speech Vive device and reports it is not helping. He planned to return this in September, but has not since - he continues to pay monthly for the device despite nonuse.  Social History:  Patient lives with his wife in [place]. He enjoys golfing, visiting with his grandchildren, gardening, and visiting friends.  Occupation:  Retired salesman  Prior Level of Function: stuttering   Current Level of Function: worsening stuttering, some swallowing difficulty, taking longer to eat meals  Pain Scale: no pain indicated throughout session  Patient's Therapy Goals:  "try to be able to talk to people again"  Objective   Formal Assessment:  MOTOR SPEECH EVALUATION    Communication Related Quality of Life  The Communication Participation Item Bank was administered to the patient to assess for the impact of his current impairment on his quality of life.  Total Raw Score: 11/30  T-Score: 42.20  Communication QOL Severity: Average  Reference scores for the CPIB. Please note that the score ranges should be interpreted with relation to the reference group, which is 700 people with various communication disorders. Therefore, a T-score in the average range would be interpreted as Average communication related quality of life compared to most people with communication disorders. Someone with no communication deficits would receive a score of 74.3.  Below Average Average Above Average   17.8-34 35-65 66-74.3     Evaluation of Speech Mechanisms  Respiration:  Posture: WNL  Breath Support: Shallow  Breath Rate: Slightly labored. Patient completed 20 breaths in one minute. The norm for breath rate of an adult is between 12 and 18 cycles per minute.  Respiratory Features: " WNL: Diaphragmatic Breathing    Cranial Nerve Examination  Cranial Nerve 5: Trigeminal Nerve  Motor Jaw Posture at rest: Closed  Mandible Elevation/Depression: WFL  Mandible lateralization: WFL  Abnormal movement: absent Interpretation: Intact bilaterally    Sensory Forehead: WFL  Cheek: WFL  Jaw: WFL  Facial Pain: None noted Interpretation: Intact bilaterally      Cranial Nerve 7: Facial Nerve  Motor Facial Symmetry: WNL  Wrinkle Forehead: WFL  Close eyes tightly: WFL  Labial Protrusion: WFL  Labial Retraction: WFL  Buccal Strength with Labial Seal: WFL  Abnormal movement: absent Interpretation: Intact bilaterally    Sensory Formal testing not completed.       Cranial Nerves IX and X: Glossopharyngeal and Vagus Nerves  Motor Palatal Symmetry (Rest): WNL  Palatal Symmetry (Movement): WNL  Cough: Perceptually strong  Voice Prior to PO intake: Clear  Resonance: Normal  Abnormal movement: absent Interpretation: Intact bilaterally      Cranial Nerve XII: Hypoglossal Nerve  Motor Tongue at rest: WNL  Lingual Protrusion: WNL  Lingual Protrusion against Resistance: WNL  Lingual Lateralization: WNL  Abnormal movement: absent Interpretation: Intact bilaterally      Other information:  Mucosal Quality: No abnormal findings  Secretion Management: xerostomia  Dentition: Good condition for speech and mastication    Perceptual Ratings  Respiratory Features: within functional limits  Respiratory/Phonatory Features: Reduced loudness, monoloudness  Phonatory Features: Monopitch  Phonatory Quality: within functional limits  Resonatory Features: no significant resonatory issues  Articulatory Features: Irregular articulatory breakdowns  Prosodic Features: Fast rushes of speech, reduced stress  Other Features: n/a    Intelligibility  Words (choose 10): 100% intelligibility  Phrases (choose 10): 100% intelligibility  Sentences (choose 11):   5-7 word sentences: 100% intelligibility   8-9 word sentences: 60% intelligibility, increased  stutter on initial phoneme, faster rushes of speech  10+ word sentences: 70% intelligibility, increased rapid rate of speech  Paragraph (Grandfather Passage): 75% intelligibility  Conversation (at least 1 minute): <50% intelligible in unknown contexts       Respiration / Phonation:   # of reps/ 5s Norms/ # reps per second Maximum Phon. Time (ah)   P^ 21  5.0-7.1 4.2  Trial 1: 25.42 seconds   T^ 24  4.8-7.1 4.8  Trial 2: 21.26 seconds    K^ 23  4.4-7.4 4.6  Trial 3: 23.87 seconds   P^T^K^ 11  3.6-7.5 2.2  Av.52             Norm (F 10-26, M 13-34.6)      Phonation Oral Reading Conversation   Stimulus Sustained ah    Pitch Flexibility: decreased upper pitch register    Counting 1 to highest # on one breath: 20 The Grandfather Passage History and Conversation   Quality clear clear clear   Duration  23.52 seconds  N/a N/a   Loudness  WFL    dB level: 72-78 dB monoloudness    dB level: 60-65dB monoloudness    dB level: 60-65dB   Steadiness WFL WFL WFL     Hearing: within functional limits  Vision: reading glasses - baseline   Awareness: self-aware    Treatment   Total Treatment Time Separate from Evaluation: 10 minutes   Treatment included the following:  Education and use of speech enhancing strategies  Slow, loud, articulate, pause (SLAP)  Focusing primarily on increasing his jaw opening and increasing loudness to decrease his rate of speech  Used in conversational speech - patient is self-aware of his impairments and attempts to self-correct when appropriate     Education provided:   -role of Speech Therapy, goals/plan of care, scheduling/cancellations, insurance limitations with patient  -Additional Education provided:   Motor speech strategies    Patient expressed understanding.     Home Program: initiate speech enhancing strategies into daily contexts; focus on increasing loudness and increasing jaw opening  Assessment     Claude presents to [facility] status post medical diagnosis of  dysarthria secondary to Parkinson's disease. His chief complaint today is worsening speech production in the areas of rapid rate of speech, decreased intelligibility, and difficulties maintaining social integration due to difficulties with communicating with others.     Interpretation of objective assessment: The patient participated in a motor speech evaluation to determine his current speech production abilities as compared to the most recent evaluation in September 2024. 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 Additionally and quite different from his last evaluation, there is an increase in hypophonia as evidenced by decreased vocal intensities at the phrase, sentence, and conversational levels in both known and unknown contexts. Easily observed, going from 5-7 word sentences to 8+ word sentences, there is obvious differences with an increase in initial phoneme repetition and part-word stuttering.    Demonstrates impairments including limitations as described in the problem list.     Positive prognostic factors: interest, motivation  Negative prognostic factors: progressive disease; multiple rounds of ST with little benefit  Barriers to therapy: No barriers to therapy identified.     Patient's spiritual, cultural, and educational needs considered and patient agreeable to plan of care and goals.    Patient will benefit from skilled therapy.    Rehab Potential: fair    Short Term Goals: (4 weeks) Current Progress:   Patient will utilize speech enhancing strategies at the sentence level while achieving 90% intelligibility to familiar and/or unfamiliar listeners.    Progressing/ Not Met 1/6/2025   Established this date   2. Patient will participate in Phonation Resistance Training Exercises to enhance his vocal intensity to >75dB for carryover into daily contexts.      Progressing/ Not Met 1/6/2025   Established this date   3. Patient will produce speech for >2 minutes at a time with independent self-awareness and attempted self-correction to speech impairments observed.    Progressing/ Not Met 1/6/2025   Established this date      Long Term Goals: (6 weeks) Current Progress:   Using compensatory and speech enhancing strategies, the patient will participated in conversational speech, either structured or spontaneous, for >5 minutes with >80% intelligibility in known and unknown contexts to enhance his communication participation.   Established this date       Plan     Recommended Treatment Plan:  Patient will participate in the [facility] rehabilitation program for speech therapy 1 times per week for 6 weeks to address his Communication and Motor Speech deficits, to educate patient and their family, and to participate in a home exercise program.    Therapist's Name:   Zenaida Paz M.S. L-SLP,CCC-SLP, CBIS  Speech-Language Pathologist  Certified Brain Injury Specialist  1/6/2025

## 2025-01-10 ENCOUNTER — TELEPHONE (OUTPATIENT)
Dept: ADMINISTRATIVE | Facility: CLINIC | Age: 65
End: 2025-01-10
Payer: MEDICARE

## 2025-01-10 NOTE — PROGRESS NOTES
[facility]  Speech Therapy Treatment Note- Neurological Rehabilitation  Date: 1/13/2025     Name: Claude E. Escude   MRN: [id number]   Therapy Diagnosis:   Encounter Diagnoses   Name Primary?    Dysarthria Yes    Hypophonia    Physician: Amirah Smith P*  Physician Orders: Ambulatory Referral to Speech Therapy   Medical Diagnosis: R47.1 (ICD-10-CM) - Dysarthria G20.B1 (ICD-10-CM) - Parkinson's disease with dyskinesia without fluctuating manifestations R47.89 (ICD-10-CM) - Rapid rate of speech    Visit #/ Visits Authorized: 1/20  Date of Evaluation:  1/6/25  Insurance Authorization Period: 1/6/25-12/31/25  Plan of Care Expiration Date:    2/17/25  Extended Plan of Care:  n/a   Progress Note: 2/6/25     Time In:  0901  Time Out:  0945  Total Billable Time: 44 minutes     Precautions: Fall and Communication  Subjective:   Patient reports: doing well since last visit with no complaints   He was compliant to home exercise program.   Response to previous treatment: good  Pain Scale: no pain indicated throughout session  Objective:   UNTIMED  Procedure   Speech- Language- Voice Therapy     Short Term Goals: (4 weeks) Current Progress:   Patient will utilize speech enhancing strategies at the sentence level while achieving 90% intelligibility to familiar and/or unfamiliar listeners.       Progressing/ Not Met 1/13/2025 Patient independently recalled speech enhancing strategies at beginning of session.   Patient read 5-7 word sentences aloud (25 trials) with increased loudness and >90% intelligibility. Focused on slow rate and pausing between sentences.   Patient read 10+ word sentences aloud (10 trials) with frequent cueing to increase his loudness to slow his rate of speech.    2. Patient will participate in Phonation Resistance Training Exercises to enhance his vocal intensity to >75dB for carryover into daily contexts.      Progressing/ Not Met 1/13/2025 -Patient sustained a vowel for an average of  "13 seconds and an average SPL of 78 dB   - Patient completed pitch glide up with an average SPL of 82 dB  - Patient completed pitch glide down with an average SPL of 81 dB    - Patient read functional phrases with "over the fence" with an average SPL of 79 dB and "authority" voice with an average SPL of 78  dB      Patient  participated in conversation re: weekend plans with an average SPL of 66 dB.     Patient will read aloud at the appropriate level (i.e. Words and phrases, sentences, paragraphs, conversation) with an average SPL of 68 dB       3. Patient will produce speech for >2 minutes at a time with independent self-awareness and attempted self-correction to speech impairments observed.     Progressing/ Not Met 2025 Patient participated in conversational topics related to meals with use of strategies. Initially, patient's rate of speech was relaxed and slow but towards the end rapid rate of intake.       Long Term Goals: (6 weeks) Current Progress:   Using compensatory and speech enhancing strategies, the patient will participated in conversational speech, either structured or spontaneous, for >5 minutes with >80% intelligibility in known and unknown contexts to enhance his communication participation.         Patient Education/Response:   Patient educated regarding the followin. Speech enhancing strategies  2. Focus on respiratory coordination for phonation  3. Home exercise program     Home program established: yes-Phonation Resistance Training Exercises   Patient verbalized understanding to all above education provided.     See Electronic Medical Record under Patient Instructions for exercises provided throughout therapy.  Assessment:   Claude participated well in his first therapy session focusing on knowledge and use of speech enhancing strategies. Patient completed Phonation Resistance Training Exercises with good ability, achieving good maximum phonation timing and vocal intensity. " Additionally, he utilized speech enhancing strategies and found decreased rate of speech with shorter length of utterances. He benefits from cueing to reduce his rate and create pauses in his speech. Claude is progressing well towards his goals. Current goals remain appropriate. Goals to be updated as necessary.     Patient prognosis is Fair. Patient will continue to benefit from skilled outpatient speech and language therapy to address the deficits listed in the problem list on initial evaluation, provide patient/family education and to maximize patient's level of independence in the home and community environment.   Medical necessity is demonstrated by the following IMPAIRMENTS:  Motor speech: Decreased speech intelligibility results in decreased efficiency communicating medical and social wants and needs in the case of emergency.    Barriers to Therapy: none  Patient's spiritual, cultural and educational needs considered and patient agreeable to plan of care and goals.  Plan:   Continue Plan of Care with focus on rehabilitation and compensation for hypophonia and dysarthria secondary to Parkinson's disease    Zenaida Paz M.S., L-SLP,CCC-SLP, CBIS  Speech-Language Pathologist  Certified Brain Injury Specialist  1/13/2025

## 2025-01-10 NOTE — TELEPHONE ENCOUNTER
Called pt; no answer; could not confirm appt or leave message due to line kept ringing; I was calling to confirm pt's in office EAWV appt on 1/13/25.

## 2025-01-12 DIAGNOSIS — G20.C PARKINSONISM, UNSPECIFIED PARKINSONISM TYPE: ICD-10-CM

## 2025-01-13 ENCOUNTER — CLINICAL SUPPORT (OUTPATIENT)
Dept: REHABILITATION | Facility: HOSPITAL | Age: 65
End: 2025-01-13
Payer: MEDICARE

## 2025-01-13 ENCOUNTER — OFFICE VISIT (OUTPATIENT)
Dept: FAMILY MEDICINE | Facility: CLINIC | Age: 65
End: 2025-01-13
Payer: MEDICARE

## 2025-01-13 VITALS
HEIGHT: 70 IN | OXYGEN SATURATION: 98 % | WEIGHT: 170.44 LBS | DIASTOLIC BLOOD PRESSURE: 84 MMHG | SYSTOLIC BLOOD PRESSURE: 142 MMHG | TEMPERATURE: 98 F | HEART RATE: 87 BPM | BODY MASS INDEX: 24.4 KG/M2

## 2025-01-13 DIAGNOSIS — R47.1 DYSARTHRIA: Primary | ICD-10-CM

## 2025-01-13 DIAGNOSIS — F41.9 ANXIETY: ICD-10-CM

## 2025-01-13 DIAGNOSIS — Z00.00 ENCOUNTER FOR PREVENTIVE HEALTH EXAMINATION: Primary | ICD-10-CM

## 2025-01-13 DIAGNOSIS — Z00.00 ENCOUNTER FOR MEDICARE ANNUAL WELLNESS EXAM: ICD-10-CM

## 2025-01-13 DIAGNOSIS — G20.B1 PARKINSON'S DISEASE WITH DYSKINESIA WITHOUT FLUCTUATING MANIFESTATIONS: ICD-10-CM

## 2025-01-13 DIAGNOSIS — I10 ESSENTIAL HYPERTENSION: ICD-10-CM

## 2025-01-13 DIAGNOSIS — R49.8 HYPOPHONIA: ICD-10-CM

## 2025-01-13 DIAGNOSIS — G20.C PARKINSONISM, UNSPECIFIED PARKINSONISM TYPE: ICD-10-CM

## 2025-01-13 DIAGNOSIS — E55.9 VITAMIN D DEFICIENCY: ICD-10-CM

## 2025-01-13 DIAGNOSIS — R47.1 DYSARTHRIA: ICD-10-CM

## 2025-01-13 DIAGNOSIS — D70.9 EOSINOPENIA: ICD-10-CM

## 2025-01-13 PROCEDURE — G0439 PPPS, SUBSEQ VISIT: HCPCS | Mod: ,,,

## 2025-01-13 PROCEDURE — 99999 PR PBB SHADOW E&M-EST. PATIENT-LVL V: CPT | Mod: PBBFAC,,,

## 2025-01-13 PROCEDURE — 99215 OFFICE O/P EST HI 40 MIN: CPT | Mod: PBBFAC,PO

## 2025-01-13 PROCEDURE — 92507 TX SP LANG VOICE COMM INDIV: CPT | Mod: PN

## 2025-01-13 RX ORDER — MELOXICAM 7.5 MG/1
7.5 TABLET ORAL 2 TIMES DAILY
COMMUNITY
Start: 2024-10-17

## 2025-01-13 NOTE — PROGRESS NOTES
"  Claude Escude  is a 64 year old white male who presented for a  Medicare AWV and comprehensive Health Risk Assessment today. The following components were reviewed and updated:    Medical history  Family History  Social history  Allergies and Current Medications  Health Risk Assessment  Health Maintenance  Care Team         ** See Completed Assessments for Annual Wellness Visit within the encounter summary.**         The following assessments were completed:  Living Situation  CAGE  Depression Screening  Timed Get Up and Go  Whisper Test  Cognitive Function Screening    Nutrition Screening  ADL Screening  PAQ Screening      Opioid documentation for eAWV      Patient does not have a current opioid prescription.        Review for Substance Use Disorders: Patient does not use substance        Current Outpatient Medications:     meloxicam (MOBIC) 7.5 MG tablet, Take 7.5 mg by mouth 2 (two) times daily., Disp: , Rfl:     carbidopa-levodopa  mg (SINEMET)  mg per tablet, Take 2 tablets by mouth 3 (three) times daily., Disp: 540 tablet, Rfl: 3    hydroCHLOROthiazide (HYDRODIURIL) 12.5 MG Tab, Take 1 tablet (12.5 mg total) by mouth once daily., Disp: 30 tablet, Rfl: 11    irbesartan (AVAPRO) 150 MG tablet, Take 1 tablet (150 mg total) by mouth 2 (two) times a day., Disp: 180 tablet, Rfl: 3    selegiline HCl (ELDEPRYL) 5 mg tablet, Take 1 tablet (5 mg total) by mouth 2 (two) times daily with meals., Disp: 180 tablet, Rfl: 3    sildenafiL (VIAGRA) 100 MG tablet, Take 1 tablet (100 mg total) by mouth daily as needed for Erectile Dysfunction., Disp: 30 tablet, Rfl: 11    vitamin D (VITAMIN D3) 1000 units Tab, Take 1 tablet (1,000 Units total) by mouth once daily., Disp: 30 tablet, Rfl: 11       Vitals:    01/13/25 1327   BP: (!) 142/84   Pulse: 87   Temp: 98.2 °F (36.8 °C)   TempSrc: Oral   SpO2: 98%   Weight: 77.3 kg (170 lb 6.7 oz)   Height: 5' 10" (1.778 m)   PainSc: 0-No pain      Physical Exam  Vitals and " nursing note reviewed.   Constitutional:       General: He is not in acute distress.     Appearance: Normal appearance. He is not ill-appearing.   HENT:      Head: Normocephalic and atraumatic.      Mouth/Throat:      Mouth: Mucous membranes are moist.   Eyes:      General: No scleral icterus.        Right eye: No discharge.         Left eye: No discharge.      Extraocular Movements: Extraocular movements intact.      Conjunctiva/sclera: Conjunctivae normal.   Cardiovascular:      Rate and Rhythm: Normal rate.   Pulmonary:      Effort: Pulmonary effort is normal. No respiratory distress.   Musculoskeletal:      Cervical back: Normal range of motion.      Comments: Uses cane; abnormal gait   Skin:     General: Skin is warm and dry.   Neurological:      Mental Status: He is alert and oriented to person, place, and time.   Psychiatric:         Mood and Affect: Mood normal.         Speech: Speech is rapid and pressured.         Behavior: Behavior normal. Behavior is cooperative.         Cognition and Memory: Cognition and memory normal.             Diagnoses and health risks identified today and associated recommendations/orders:    1. Encounter for preventive health examination  - Chart reviewed. Problem list updated. Discussed current medical diagnosis, current medications, medical/surgical/family/social history; updated provider list; documented vital signs; identified any cognitive impairment; and updated risk factor list. Addressed any outstanding health maintenance. Provided patient with personalized health advice. Continue to follow up with PCP and any specialists.      2. Parkinson's disease with dyskinesia without fluctuating manifestations  Chronic; stable on current treatment plan; follow up with PCP and Neurology.  Taking carbidopa-levodopa  mg TID.     3. Eosinopenia  Chronic; stable but monitoring; follow up with PCP.    4. Parkinsonism, unspecified Parkinsonism type  Chronic; stable on current  treatment plan; follow up with PCP and Neurology.  Taking carbidopa-levodopa  mg TID.     5. Anxiety  Chronic; stable; follow up with PCP as needed.     6. Essential hypertension  Chronic; stable on current treatment plan; follow up with PCP  BP slightly elevated today; taking Irbesartan BID.     7. Vitamin D deficiency  Chronic; stable on current treatment plan; follow up with PCP  Taking Vitamin D.     8. Dysarthria  Chronic; stable with Speech therapy treatment at this time; follow up with PCP and Neurology. Monitor weight due to decrease dietary intake from slowed eating.       9. Encounter for Medicare annual wellness exam  - Ambulatory Referral/Consult to Enhanced Annual Wellness Visit (eAWV)      Provided Claude with a 5-10 year written screening schedule and personal prevention plan. Recommendations were developed using the USPSTF age appropriate recommendations. Education, counseling, and referrals were provided as needed. After Visit Summary printed and given to patient which includes a list of additional screenings\tests needed.    Follow up in about 1 year (around 1/13/2026) for next annual wellness visit.    Faith Reyna, FNP-C    Advance Care Planning     I offered to discuss advanced care planning, including how to pick a person who would make decisions for you if you were unable to make them for yourself, called a health care power of , and what kind of decisions you might make such as use of life sustaining treatments such as ventilators and tube feeding when faced with a life limiting illness recorded on a living will that they will need to know. (How you want to be cared for as you near the end of your natural life)     X  Patient is unwilling to engage in a discussion regarding advance directives at this time.      Faith Reyna, MSN, APRN, FNP-C  Family Medicine  Office #[phone]

## 2025-01-13 NOTE — PATIENT INSTRUCTIONS
Counseling and Referral of Other Preventative  (Italic type indicates deductible and co-insurance are waived)    Patient Name: Claude Escude  Today's Date: 1/13/2025    Health Maintenance       Date Due Completion Date    Hepatitis C Screening Never done ---    Shingles Vaccine (1 of 2) Never done ---    Pneumococcal Vaccines (Age 50+) (1 of 1 - PCV) Never done ---    RSV Vaccine (Age 60+ and Pregnant patients) (1 - Risk 60-74 years 1-dose series) 01/13/2025 (Originally 2/4/2018) ---    Influenza Vaccine (1) 06/30/2025 (Originally 9/1/2024) 3/19/2024 (Declined)    Override on 3/19/2024: Declined    COVID-19 Vaccine (3 - 2024-25 season) 01/13/2026 (Originally 9/1/2024) 7/28/2021    PROSTATE-SPECIFIC ANTIGEN 12/19/2025 12/19/2024    Hemoglobin A1c (Prediabetes) 12/19/2025 12/19/2024    Colorectal Cancer Screening 12/06/2027 12/6/2024    Lipid Panel 12/19/2029 12/19/2024    TETANUS VACCINE 08/17/2032 8/17/2022        No orders of the defined types were placed in this encounter.      The following information is provided to all patients.  This information is to help you find resources for any of the problems found today that may be affecting your health:                  Living healthy guide: www.[facility].[place].gov      Understanding Diabetes: www.diabetes.org      Eating healthy: www.cdc.gov/healthyweight      CDC home safety checklist: www.cdc.gov/steadi/patient.html      Agency on Aging: www.goea.[place].[facility]      Alcoholics anonymous (AA): www.aa.org      Physical Activity: www.otis.nih.gov/fz6nmcs      Tobacco use: www.quitwithusla.org

## 2025-01-14 RX ORDER — SELEGILINE HYDROCHLORIDE 5 MG/1
5 TABLET ORAL 2 TIMES DAILY WITH MEALS
Qty: 180 TABLET | Refills: 3 | Status: SHIPPED | OUTPATIENT
Start: 2025-01-14

## 2025-01-30 NOTE — PROGRESS NOTES
[facility]  Speech Therapy Treatment Note- Neurological Rehabilitation  Date: 1/31/2025     Name: Claude E. Escude   MRN: [id number]   Therapy Diagnosis:   Encounter Diagnoses   Name Primary?    Dysarthria Yes    Hypophonia      Physician: Aimrah Smith P*  Physician Orders: Ambulatory Referral to Speech Therapy   Medical Diagnosis: R47.1 (ICD-10-CM) - Dysarthria G20.B1 (ICD-10-CM) - Parkinson's disease with dyskinesia without fluctuating manifestations R47.89 (ICD-10-CM) - Rapid rate of speech    Visit #/ Visits Authorized: 2/20  Date of Evaluation:  1/6/25  Insurance Authorization Period: 1/6/25-12/31/25  Plan of Care Expiration Date:    2/17/25  Extended Plan of Care:  n/a   Progress Note: 2/6/25     Time In:  0950  Time Out:  1030  Total Billable Time: 40 minutes     Precautions: Fall and Communication  Subjective:   Patient reports: had a moment yesterday on the phone where his speech was slower   He was compliant to home exercise program.   Response to previous treatment: good  Pain Scale: no pain indicated throughout session  Objective:   UNTIMED  Procedure   Speech- Language- Voice Therapy     Short Term Goals: (4 weeks) Current Progress:   Patient will utilize speech enhancing strategies at the sentence level while achieving 90% intelligibility to familiar and/or unfamiliar listeners.    Progressing/ Not Met 1/31/2025 While reading, patient still demonstrates short rushes of speech with hypophonia which limits his success with intelligibility.   2. Patient will participate in Phonation Resistance Training Exercises to enhance his vocal intensity to >75dB for carryover into daily contexts.      Progressing/ Not Met 1/31/2025 -Patient sustained a vowel for an average of 9 seconds (decreased) and an average SPL of 91.6 dB (increased from 78 dB)   - Patient completed pitch glide up with an average SPL of 91.8 dB  - Patient completed pitch glide down with an average SPL of 88 dB    -  "Patient read functional phrases with "over the fence" with an average SPL of 78.5 dB (stable) and "authority" voice with an average SPL of 78.7 dB (stable)      Patient  participated in conversation re: weekend plans with an average SPL of 76.3 dB (improved from 66 dB).     Patient will read aloud at the appropriate level (i.e. Words and phrases, sentences, paragraphs, conversation) with an average SPL of 79.4 dB (previously 68dB)       3. Patient will produce speech for >2 minutes at a time with independent self-awareness and attempted self-correction to speech impairments observed.     Progressing/ Not Met 2025 Self-awareness in speech production moments is decreased today, requiring clinician to interrupt to yield improvements in his rate of speech becoming slower and increasing his loudness.       Long Term Goals: (6 weeks) Current Progress:   Using compensatory and speech enhancing strategies, the patient will participated in conversational speech, either structured or spontaneous, for >5 minutes with >80% intelligibility in known and unknown contexts to enhance his communication participation.         Patient Education/Response:   Patient educated regarding the followin. Speech enhancing strategies  2. Focus on respiratory coordination for phonation  3. Home exercise program     Home program established: yes-Phonation Resistance Training Exercises   Patient verbalized understanding to all above education provided.     See Electronic Medical Record under Patient Instructions for exercises provided throughout therapy.  Assessment:   Claude participated well in his treatment session focusing on knowledge and use of speech enhancing strategies. Patient completed Phonation Resistance Training Exercises with good ability, with improvements in his vocal intensity today as compared to previous session. His vowel duration was decreased from previous sessions. Additionally, he utilized speech enhancing " strategies and found decreased rate of speech with shorter length of utterances. He benefits from cueing to reduce his rate and create pauses in his speech, requiring more prompting today than in previous sessions. Claude is progressing well towards his goals. Current goals remain appropriate. Goals to be updated as necessary.     Patient prognosis is Fair. Patient will continue to benefit from skilled outpatient speech and language therapy to address the deficits listed in the problem list on initial evaluation, provide patient/family education and to maximize patient's level of independence in the home and community environment.   Medical necessity is demonstrated by the following IMPAIRMENTS:  Motor speech: Decreased speech intelligibility results in decreased efficiency communicating medical and social wants and needs in the case of emergency.    Barriers to Therapy: none  Patient's spiritual, cultural and educational needs considered and patient agreeable to plan of care and goals.  Plan:   Continue Plan of Care with focus on rehabilitation and compensation for hypophonia and dysarthria secondary to Parkinson's disease    Zenaida Paz M.S., L-SLP,CCC-SLP, CBIS  Speech-Language Pathologist  Certified Brain Injury Specialist  1/31/2025

## 2025-01-31 ENCOUNTER — CLINICAL SUPPORT (OUTPATIENT)
Dept: REHABILITATION | Facility: HOSPITAL | Age: 65
End: 2025-01-31
Payer: MEDICARE

## 2025-01-31 DIAGNOSIS — R49.8 HYPOPHONIA: ICD-10-CM

## 2025-01-31 DIAGNOSIS — R47.1 DYSARTHRIA: Primary | ICD-10-CM

## 2025-01-31 PROCEDURE — 92507 TX SP LANG VOICE COMM INDIV: CPT | Mod: PN

## 2025-02-05 ENCOUNTER — PATIENT MESSAGE (OUTPATIENT)
Dept: PRIMARY CARE CLINIC | Facility: CLINIC | Age: 65
End: 2025-02-05
Payer: MEDICARE

## 2025-02-05 RX ORDER — ONDANSETRON 4 MG/1
4 TABLET, ORALLY DISINTEGRATING ORAL EVERY 8 HOURS PRN
Qty: 15 TABLET | Refills: 1 | Status: SHIPPED | OUTPATIENT
Start: 2025-02-05

## 2025-02-06 ENCOUNTER — CLINICAL SUPPORT (OUTPATIENT)
Dept: REHABILITATION | Facility: HOSPITAL | Age: 65
End: 2025-02-06
Payer: MEDICARE

## 2025-02-06 DIAGNOSIS — R49.8 HYPOPHONIA: ICD-10-CM

## 2025-02-06 DIAGNOSIS — R47.1 DYSARTHRIA: Primary | ICD-10-CM

## 2025-02-06 PROCEDURE — 92507 TX SP LANG VOICE COMM INDIV: CPT | Mod: PN

## 2025-02-06 NOTE — PROGRESS NOTES
"[facility]  Speech Therapy PROGRESS Note- Neurological Rehabilitation  Date: 2/6/2025     Name: Claude E. Escude   MRN: [id number]   Therapy Diagnosis:   Encounter Diagnoses   Name Primary?    Dysarthria Yes    Hypophonia      Physician: Amirah Smith P*  Physician Orders: Ambulatory Referral to Speech Therapy   Medical Diagnosis: R47.1 (ICD-10-CM) - Dysarthria G20.B1 (ICD-10-CM) - Parkinson's disease with dyskinesia without fluctuating manifestations R47.89 (ICD-10-CM) - Rapid rate of speech    Visit #/ Visits Authorized: 3/20  Date of Evaluation:  1/6/25  Insurance Authorization Period: 1/6/25-12/31/25  Plan of Care Expiration Date:    2/17/25  Extended Plan of Care:  n/a   Progress Note: 2/6/25   FOTO: 2 completed    Time In:  1305  Time Out:  1347  Total Billable Time: 42 minutes     Precautions: Fall and Communication  Subjective:   Patient reports:   He was compliant to home exercise program.   Response to previous treatment: good  Pain Scale: no pain indicated throughout session  Objective:   UNTIMED  Procedure   Speech- Language- Voice Therapy         Short Term Goals: (4 weeks) Current Progress:   Patient will utilize speech enhancing strategies at the sentence level while achieving 90% intelligibility to familiar and/or unfamiliar listeners.    Progressing/ Not Met 2/6/2025 Did not address today   2. Patient will participate in Phonation Resistance Training Exercises to enhance his vocal intensity to >75dB for carryover into daily contexts.      MET 2/6/25 -Patient sustained a vowel for an average of 9.5 seconds (decreased) and an average SPL of 90.4 dB (increased from 78 dB)   - Patient completed pitch glide up with an average SPL of 93 dB  - Patient completed pitch glide down with an average SPL of 90.4 dB    - Patient read functional phrases with "over the fence" with an average SPL of 78.9 dB (stable) and "authority" voice with an average SPL of 79.7 dB (stable)      Patient  " participated in conversation re: weekend plans with an average SPL of 75.1 dB (improved from 66 dB).     Patient will read aloud at the appropriate level (i.e. Words and phrases, sentences, paragraphs, conversation) with an average SPL of 79.6 dB (previously 68dB)       3. Patient will produce speech for >2 minutes at a time with independent self-awareness and attempted self-correction to speech impairments observed.     Progressing/ Not Met 2025 Self-awareness increased during speech production though independent use of stopping and slowing down required cueing and prompting from clinician      Long Term Goals: (6 weeks) Current Progress:   Using compensatory and speech enhancing strategies, the patient will participated in conversational speech, either structured or spontaneous, for >5 minutes with >80% intelligibility in known and unknown contexts to enhance his communication participation.         Patient Education/Response:   Patient educated regarding the followin. Speech enhancing strategies  2. Focus on respiratory coordination for phonation  3. Home exercise program     Home program established: yes-Phonation Resistance Training Exercises   Patient verbalized understanding to all above education provided.     See Electronic Medical Record under Patient Instructions for exercises provided throughout therapy.  Assessment:   Reassessment was completed today with self-rated improvements in the patient's FOTO survey. Claude participated well in his treatment session focusing on knowledge and use of speech enhancing strategies. Patient completed Phonation Resistance Training Exercises with good ability, with stable progress in his vocal intensity today, meeting this short-term goal today. He required cueing and prompting for slow rate of speech in conversation due to his rapid rate of speech which led to decreased intelligibility. Claude is progressing well towards his goals. Current goals remain  appropriate. Goals to be updated as necessary.     Patient prognosis is Fair. Patient will continue to benefit from skilled outpatient speech and language therapy to address the deficits listed in the problem list on initial evaluation, provide patient/family education and to maximize patient's level of independence in the home and community environment.   Medical necessity is demonstrated by the following IMPAIRMENTS:  Motor speech: Decreased speech intelligibility results in decreased efficiency communicating medical and social wants and needs in the case of emergency.    Barriers to Therapy: none  Patient's spiritual, cultural and educational needs considered and patient agreeable to plan of care and goals.  Plan:   Continue Plan of Care with focus on rehabilitation and compensation for hypophonia and dysarthria secondary to Parkinson's disease    Zenaida Paz M.S., L-SLP,CCC-SLP, CBIS  Speech-Language Pathologist  Certified Brain Injury Specialist  2/6/2025

## 2025-02-10 ENCOUNTER — CLINICAL SUPPORT (OUTPATIENT)
Dept: REHABILITATION | Facility: HOSPITAL | Age: 65
End: 2025-02-10
Payer: MEDICARE

## 2025-02-10 DIAGNOSIS — R47.1 DYSARTHRIA: Primary | ICD-10-CM

## 2025-02-10 DIAGNOSIS — R49.8 HYPOPHONIA: ICD-10-CM

## 2025-02-10 PROCEDURE — 92507 TX SP LANG VOICE COMM INDIV: CPT | Mod: PN

## 2025-02-10 NOTE — PROGRESS NOTES
[facility]  Speech Therapy Treatment Note- Neurological Rehabilitation  Date: 2/10/2025     Name: Claude E. Escude   MRN: [id number]   Therapy Diagnosis:   Encounter Diagnoses   Name Primary?    Dysarthria Yes    Hypophonia      Physician: Amirah Smith P*  Physician Orders: Ambulatory Referral to Speech Therapy   Medical Diagnosis: R47.1 (ICD-10-CM) - Dysarthria G20.B1 (ICD-10-CM) - Parkinson's disease with dyskinesia without fluctuating manifestations R47.89 (ICD-10-CM) - Rapid rate of speech    Visit #/ Visits Authorized: 4/20  Date of Evaluation:  1/6/25  Insurance Authorization Period: 1/6/25-12/31/25  Plan of Care Expiration Date:    2/17/25  Extended Plan of Care:  n/a   Progress Note: 3/6/25   FOTO: 2 completed    Time In:  1245  Time Out: 1325  Total Billable Time: 40 minutes     Precautions: Fall and Communication  Subjective:   Patient reports: doing well since last visit; had restless arm syndrome  He was compliant to home exercise program.   Response to previous treatment: good  Pain Scale: no pain indicated throughout session  Objective:   UNTIMED  Procedure   Speech- Language- Voice Therapy     Short Term Goals: (4 weeks) Current Progress:   Patient will utilize speech enhancing strategies at the sentence level while achieving 90% intelligibility to familiar and/or unfamiliar listeners.  Progressing/ Not Met 2/10/2025 Patient utilized speech strategies while reading a paragraph aloud in background noise (outside) - utilized increased loudness which slowed rate of speaking overall. Reminders and cues to breath in between phrases and at the end of sentences was helpful.    2. Patient will participate in Phonation Resistance Training Exercises to enhance his vocal intensity to >75dB for carryover into daily contexts.   MET 2/6/25    3. Patient will produce speech for >2 minutes at a time with independent self-awareness and attempted self-correction to speech impairments observed.    "  Progressing/ Not Met 2/10/2025 > 2 minutes conversational speech produced with >90% intelligibility with 1-2 prompts from clinician to utilize pausing and slowing his rate of speech.     Speech-language pathologist instructed patient in mindfulness and deep breathing when becoming frustrated with his speech production as a means to "reset" the system for speech production. He verbalized understanding to using this strategy and he plans to try it tomorrow while playing golf which is an ongoing frustration to him due to his decreasing ability to play like he used to on the course.      Long Term Goals: (6 weeks) Current Progress:   Using compensatory and speech enhancing strategies, the patient will participated in conversational speech, either structured or spontaneous, for >5 minutes with >80% intelligibility in known and unknown contexts to enhance his communication participation.      Patient Education/Response:   Patient educated regarding the followin. Speech enhancing strategies  2. Focus on respiratory coordination for phonation  3. Home exercise program     Home program established: yes-Phonation Resistance Training Exercises   Patient verbalized understanding to all above education provided.     See Electronic Medical Record under Patient Instructions for exercises provided throughout therapy.  Assessment:   Claude participated well in his treatment session focusing on knowledge and use of speech enhancing strategies at the sentence, paragraph, and conversational speech levels. He required cueing and prompting for slow rate of speech in conversation, though less than in previous sessions indicating improvements in his self-awareness. Speech-language pathologist instructed patient in use of mindfulness and deep breathing for if and when he becomes frustrated with his speech performance. Claude is progressing well towards his goals. Current goals remain appropriate. Goals to be updated as necessary. "     Patient prognosis is Fair. Patient will continue to benefit from skilled outpatient speech and language therapy to address the deficits listed in the problem list on initial evaluation, provide patient/family education and to maximize patient's level of independence in the home and community environment.   Medical necessity is demonstrated by the following IMPAIRMENTS:  Motor speech: Decreased speech intelligibility results in decreased efficiency communicating medical and social wants and needs in the case of emergency.    Barriers to Therapy: none  Patient's spiritual, cultural and educational needs considered and patient agreeable to plan of care and goals.  Plan:   Continue Plan of Care with focus on rehabilitation and compensation for hypophonia and dysarthria secondary to Parkinson's disease    Zenaida Paz SLPD, L-SLP,CCC-SLP, CBIS  Speech-Language Pathologist  Certified Brain Injury Specialist  2/10/2025

## 2025-02-14 NOTE — PROGRESS NOTES
[facility]  Speech Therapy Treatment and Discharge Note- Neurological Rehabilitation  Date: 2/17/2025     Name: Claude E. Escude   MRN: [id number]   Therapy Diagnosis:   Encounter Diagnoses   Name Primary?    Dysarthria Yes    Hypophonia      Physician: Amirah Smith P*  Physician Orders: Ambulatory Referral to Speech Therapy   Medical Diagnosis: R47.1 (ICD-10-CM) - Dysarthria G20.B1 (ICD-10-CM) - Parkinson's disease with dyskinesia without fluctuating manifestations R47.89 (ICD-10-CM) - Rapid rate of speech    Visit #/ Visits Authorized: 5/20  Date of Evaluation:  1/6/25  Insurance Authorization Period: 1/6/25-12/31/25  Plan of Care Expiration Date:    2/17/25  Extended Plan of Care:  n/a   Progress Note: 3/6/25   FOTO: 2 completed    Time In:  0900  Time Out: 0930  Total Billable Time: 30 minutes     Precautions: Fall and Communication  Subjective:   Patient reports: doing well since last visit with no new complaints  He was compliant to home exercise program.   Response to previous treatment: good  Pain Scale: no pain indicated throughout session    FOTO:       Objective:   UNTIMED  Procedure   Speech- Language- Voice Therapy     Communication Related Quality of Life  The Communication Participation Item Bank was administered to the patient to assess for the impact of [his/her] current impairment on [his/her] quality of life.  Total Raw Score: 9/30  T-Score: 40.1  Communication QOL Severity: Average  Reference scores for the CPIB. Please note that the score ranges should be interpreted with relation to the reference group, which is 700 people with various communication disorders. Therefore, a T-score in the average range would be interpreted as Average communication related quality of life compared to most people with communication disorders. Someone with no communication deficits would receive a score of 74.3.  Below Average Average Above Average   17.8-34 35-65 66-74.3     Short Term  Goals: (4 weeks) Current Progress:   Patient will utilize speech enhancing strategies at the sentence level while achieving 90% intelligibility to familiar and/or unfamiliar listeners.  MET 25 Patient utilized speech strategies while reading multiple paragraphs aloud. He demonstrated good self awareness of his rate of speech today.     2. Patient will participate in Phonation Resistance Training Exercises to enhance his vocal intensity to >75dB for carryover into daily contexts.   MET 25    3. Patient will produce speech for >2 minutes at a time with independent self-awareness and attempted self-correction to speech impairments observed.     MET 25 Conversational speech and use of strategies for slow rate of speech and overarticulation were utilized. When stuttering began, patient attempted to self-correct independently. There are islands of fluency observed today.      Long Term Goals: (6 weeks) Current Progress:   Using compensatory and speech enhancing strategies, the patient will participated in conversational speech, either structured or spontaneous, for >5 minutes with >80% intelligibility in known and unknown contexts to enhance his communication participation.  MET 25 Patient achieved >80% intelligibility to this familiar and trained listener for >5 minute conversation surrounding his employment, family, and interests. There are islands of fluency and attempts at self-correction.     Patient Education/Response:   Patient educated regarding the followin. Speech enhancing strategies  2. Focus on respiratory coordination for phonation  3. Home exercise program     Home program established: yes-Phonation Resistance Training Exercises   Patient verbalized understanding to all above education provided.     See Electronic Medical Record under Patient Instructions for exercises provided throughout therapy.  Assessment:   Claude participated well in his treatment session focusing on use of  speech enhancing strategies at the sentence, paragraph, and conversational speech levels. He improved today in his self-awareness and use of strategies as compared to previous sessions. Speech-language pathologist observed improvements in his decreased rate of speech and intelligibility improvements. He endorses continued difficulties with swallowing and is interested in completing Modified Barium Swallow Study to assess his current physiological function of the swallow. With patient outcome measures, he self-reports stability in his speech production and communication participation, with a slight decrease in his CPIB scores this date. Following discussion of improvements in his speech production, both the speech-language pathologist and patient are in agreement with discharge this date.    Patient prognosis is Fair. Patient will continue to benefit from skilled outpatient speech and language therapy to address the deficits listed in the problem list on initial evaluation, provide patient/family education and to maximize patient's level of independence in the home and community environment.   Medical necessity is demonstrated by the following IMPAIRMENTS:  Motor speech: Decreased speech intelligibility results in decreased efficiency communicating medical and social wants and needs in the case of emergency.    Barriers to Therapy: none  Patient's spiritual, cultural and educational needs considered and patient agreeable to plan of care and goals.  Plan:   Discharge from speech therapy    Zenaida Paz SLPD, L-SLP,CCC-SLP, CBIS  Speech-Language Pathologist  Certified Brain Injury Specialist  2/17/2025

## 2025-02-15 DIAGNOSIS — R47.89 RAPID RATE OF SPEECH: ICD-10-CM

## 2025-02-15 DIAGNOSIS — G20.C PARKINSONISM, UNSPECIFIED PARKINSONISM TYPE: ICD-10-CM

## 2025-02-17 ENCOUNTER — CLINICAL SUPPORT (OUTPATIENT)
Dept: REHABILITATION | Facility: HOSPITAL | Age: 65
End: 2025-02-17
Payer: MEDICARE

## 2025-02-17 ENCOUNTER — TELEPHONE (OUTPATIENT)
Facility: CLINIC | Age: 65
End: 2025-02-17
Payer: MEDICARE

## 2025-02-17 DIAGNOSIS — R47.1 DYSARTHRIA: Primary | ICD-10-CM

## 2025-02-17 DIAGNOSIS — G20.B1 PARKINSON'S DISEASE WITH DYSKINESIA WITHOUT FLUCTUATING MANIFESTATIONS: Primary | ICD-10-CM

## 2025-02-17 DIAGNOSIS — R13.10 DYSPHAGIA, UNSPECIFIED TYPE: ICD-10-CM

## 2025-02-17 DIAGNOSIS — R49.8 HYPOPHONIA: ICD-10-CM

## 2025-02-17 PROCEDURE — 92507 TX SP LANG VOICE COMM INDIV: CPT | Mod: PN

## 2025-02-17 RX ORDER — CARBIDOPA AND LEVODOPA 25; 100 MG/1; MG/1
2 TABLET ORAL 3 TIMES DAILY
Qty: 540 TABLET | Refills: 3 | Status: SHIPPED | OUTPATIENT
Start: 2025-02-17

## 2025-02-17 NOTE — TELEPHONE ENCOUNTER
----- Message from Zenaida Paz sent at 2/17/2025  9:26 AM CST -----  Regarding: Order Request - [facility] [place]  Good morning,Mr. Claude has been complaining of swallowing difficulties and is interested in repeating a Modified Barium Swallow Study since his last one was done in 2021. If you are in agreement, please place orders for him to complete at [facility].Zenaida Brown

## 2025-02-24 ENCOUNTER — PATIENT MESSAGE (OUTPATIENT)
Dept: REHABILITATION | Facility: HOSPITAL | Age: 65
End: 2025-02-24
Payer: MEDICARE

## 2025-03-06 ENCOUNTER — PATIENT MESSAGE (OUTPATIENT)
Dept: PRIMARY CARE CLINIC | Facility: CLINIC | Age: 65
End: 2025-03-06
Payer: MEDICARE

## 2025-03-06 DIAGNOSIS — G20.C PARKINSONISM, UNSPECIFIED PARKINSONISM TYPE: Primary | ICD-10-CM

## 2025-03-06 DIAGNOSIS — R29.6 FALLS FREQUENTLY: ICD-10-CM

## 2025-03-10 PROBLEM — Z74.09 IMPAIRED FUNCTIONAL MOBILITY, BALANCE, GAIT, AND ENDURANCE: Status: ACTIVE | Noted: 2025-03-10

## 2025-03-10 PROBLEM — R29.6 RECURRENT FALLS: Status: ACTIVE | Noted: 2025-03-10

## 2025-03-14 ENCOUNTER — LAB VISIT (OUTPATIENT)
Dept: LAB | Facility: HOSPITAL | Age: 65
End: 2025-03-14
Attending: INTERNAL MEDICINE
Payer: MEDICARE

## 2025-03-14 DIAGNOSIS — I10 ESSENTIAL HYPERTENSION: ICD-10-CM

## 2025-03-14 DIAGNOSIS — R73.03 PREDIABETES: ICD-10-CM

## 2025-03-14 DIAGNOSIS — E55.9 VITAMIN D DEFICIENCY: ICD-10-CM

## 2025-03-14 LAB
25(OH)D3+25(OH)D2 SERPL-MCNC: 24 NG/ML (ref 30–96)
ALBUMIN SERPL BCP-MCNC: 4.4 G/DL (ref 3.5–5.2)
ALP SERPL-CCNC: 65 U/L (ref 40–150)
ALT SERPL W/O P-5'-P-CCNC: <5 U/L (ref 10–44)
ANION GAP SERPL CALC-SCNC: 6 MMOL/L (ref 8–16)
AST SERPL-CCNC: 18 U/L (ref 10–40)
BILIRUB SERPL-MCNC: 1.2 MG/DL (ref 0.1–1)
BUN SERPL-MCNC: 15 MG/DL (ref 8–23)
CALCIUM SERPL-MCNC: 9.8 MG/DL (ref 8.7–10.5)
CHLORIDE SERPL-SCNC: 106 MMOL/L (ref 95–110)
CHOLEST SERPL-MCNC: 144 MG/DL (ref 120–199)
CHOLEST/HDLC SERPL: 2.4 {RATIO} (ref 2–5)
CO2 SERPL-SCNC: 29 MMOL/L (ref 23–29)
CREAT SERPL-MCNC: 1 MG/DL (ref 0.5–1.4)
EST. GFR  (NO RACE VARIABLE): >60 ML/MIN/1.73 M^2
ESTIMATED AVG GLUCOSE: 120 MG/DL (ref 68–131)
GLUCOSE SERPL-MCNC: 121 MG/DL (ref 70–110)
HBA1C MFR BLD: 5.8 % (ref 4–5.6)
HDLC SERPL-MCNC: 59 MG/DL (ref 40–75)
HDLC SERPL: 41 % (ref 20–50)
LDLC SERPL CALC-MCNC: 71.8 MG/DL (ref 63–159)
NONHDLC SERPL-MCNC: 85 MG/DL
POTASSIUM SERPL-SCNC: 4.5 MMOL/L (ref 3.5–5.1)
PROT SERPL-MCNC: 7.4 G/DL (ref 6–8.4)
SODIUM SERPL-SCNC: 141 MMOL/L (ref 136–145)
TRIGL SERPL-MCNC: 66 MG/DL (ref 30–150)

## 2025-03-14 PROCEDURE — 83036 HEMOGLOBIN GLYCOSYLATED A1C: CPT | Performed by: INTERNAL MEDICINE

## 2025-03-14 PROCEDURE — 80053 COMPREHEN METABOLIC PANEL: CPT | Performed by: INTERNAL MEDICINE

## 2025-03-14 PROCEDURE — 36415 COLL VENOUS BLD VENIPUNCTURE: CPT | Performed by: INTERNAL MEDICINE

## 2025-03-14 PROCEDURE — 80061 LIPID PANEL: CPT | Performed by: INTERNAL MEDICINE

## 2025-03-14 PROCEDURE — 82306 VITAMIN D 25 HYDROXY: CPT | Performed by: INTERNAL MEDICINE

## 2025-03-16 ENCOUNTER — RESULTS FOLLOW-UP (OUTPATIENT)
Dept: PRIMARY CARE CLINIC | Facility: CLINIC | Age: 65
End: 2025-03-16

## 2025-03-19 ENCOUNTER — OFFICE VISIT (OUTPATIENT)
Dept: PRIMARY CARE CLINIC | Facility: CLINIC | Age: 65
End: 2025-03-19
Payer: MEDICARE

## 2025-03-19 VITALS
DIASTOLIC BLOOD PRESSURE: 86 MMHG | WEIGHT: 164.38 LBS | BODY MASS INDEX: 23.53 KG/M2 | SYSTOLIC BLOOD PRESSURE: 148 MMHG | HEIGHT: 70 IN | OXYGEN SATURATION: 98 % | HEART RATE: 97 BPM

## 2025-03-19 DIAGNOSIS — Z00.00 HEALTHCARE MAINTENANCE: ICD-10-CM

## 2025-03-19 DIAGNOSIS — I10 ESSENTIAL HYPERTENSION: Primary | ICD-10-CM

## 2025-03-19 DIAGNOSIS — R73.03 PREDIABETES: ICD-10-CM

## 2025-03-19 DIAGNOSIS — G20.C PARKINSONISM, UNSPECIFIED PARKINSONISM TYPE: ICD-10-CM

## 2025-03-19 DIAGNOSIS — R13.12 OROPHARYNGEAL DYSPHAGIA: ICD-10-CM

## 2025-03-19 DIAGNOSIS — F39 MILD MOOD DISORDER: ICD-10-CM

## 2025-03-19 DIAGNOSIS — R29.6 FALLS FREQUENTLY: ICD-10-CM

## 2025-03-19 PROBLEM — R73.9 HYPERGLYCEMIA: Status: RESOLVED | Noted: 2021-05-05 | Resolved: 2025-03-19

## 2025-03-19 PROCEDURE — 99213 OFFICE O/P EST LOW 20 MIN: CPT | Mod: PBBFAC,PN | Performed by: INTERNAL MEDICINE

## 2025-03-19 PROCEDURE — 99999 PR PBB SHADOW E&M-EST. PATIENT-LVL III: CPT | Mod: PBBFAC,,, | Performed by: INTERNAL MEDICINE

## 2025-03-19 RX ORDER — IRBESARTAN 150 MG/1
150 TABLET ORAL 2 TIMES DAILY
Qty: 180 TABLET | Refills: 3 | Status: SHIPPED | OUTPATIENT
Start: 2025-03-19 | End: 2026-03-19

## 2025-03-19 RX ORDER — AMLODIPINE BESYLATE 2.5 MG/1
2.5 TABLET ORAL DAILY
Qty: 30 TABLET | Refills: 6 | Status: SHIPPED | OUTPATIENT
Start: 2025-03-19 | End: 2026-03-19

## 2025-03-19 NOTE — PROGRESS NOTES
Subjective:       Patient ID: Claude E. Escude is a 65 y.o. male.    Chief Complaint: Follow-up      HPI  Claude E. Escude is a 65 y.o. male with hypertension, Parkinson's disease who presents today for Follow-up    Claude presents today for follow up of blood pressure.    Reports he was checking his blood pressure at the pharmacy but the machine was not available in the last few weeks. Reports blood pressure of 130s/70-80s.    He experiences falls due to his right foot sticking to the ground while walking. He uses a cane consistently and denies falls while using it, but reports falling when attempting to walk without the cane across the house. Started therapy last week.    His speech has progressively worsened over the past 2-6 years. Speech volume has decreased significantly, requiring prompting from speech therapist to speak louder. Many times is not able to communicate effectively via telephone and has switched to texting due to others having difficulty understanding his speech. Denies dizziness, weak spells, or numbness. On Sinemet and Selegiline.    He recently completed a swallow test two days ago due to difficulty eating but noted with no signs of aspiration or penetration. He notes consuming food without drinking fluids and has been instructed to properly chew food, swallow, and take sips of water between bites. He experienced dry heaves for 4 consecutive days two weeks ago, followed by one day of symptoms in the past week.    He reports difficulty staying asleep, waking around 4:30 AM despite going to bed at midnight. Melatonin up to 10mg has been ineffective. He denies feeling tired or having little energy despite sleep disturbances. He denies anxiety, feeling down, depressed, or hopeless in relation to sleep problems. Magnesium has helped restless legs.    He reports significant progressive weight loss, which he attributes to decreased appetite and reduced food intake compared to previous eating habits.  Notices decreased appetite as it takes him longer to eat and no longer wants to eat when others are done first with their meal.    Kidney and liver enzymes were normal, with one enzyme low but not of concern. Bilirubin has improved and is almost normal. Fasting glucose was slightly elevated with A1c in the pre-diabetic range. Cholesterol has improved with LDL at 71. PSA was normal.      ROS:  General: -fever, -chills, -fatigue, -weight gain, +weight loss  Eyes: -vision changes, -redness, -discharge  ENT: -ear pain, -nasal congestion, -sore throat  Cardiovascular: -chest pain, -palpitations, -lower extremity edema  Respiratory: -cough, -shortness of breath  Gastrointestinal: -abdominal pain, -nausea, -vomiting, -diarrhea, -constipation, -blood in stool, +loss of appetite  Genitourinary: -dysuria, -hematuria, -frequency  Musculoskeletal: -joint pain, -muscle pain  Skin: -rash, -lesion  Neurological: -headache, -dizziness, -numbness, -tingling, +sleep disturbances, +difficulty staying asleep, +difficulty falling asleep, +speech difficulty, +falling, +abnormal gait, +difficulty walking  Psychiatric: -anxiety, -depression, +sleep difficulty, +racing thoughts, +irritability            Past Medical History:   Diagnosis Date    HTN (hypertension)     Parkinson's disease        Past Surgical History:   Procedure Laterality Date    JOINT REPLACEMENT         Family History   Problem Relation Name Age of Onset    Heart disease Mother      Hypertension Mother      Diabetes Mellitus Mother      Arthritis Mother      Prostate cancer Father  48    No Known Problems Sister      Bone cancer Brother  43    Heart attack Brother  46        smoked    Heart disease Maternal Grandmother      Heart disease Maternal Grandfather      Heart disease Paternal Grandmother      Heart disease Paternal Grandfather         Social History     Socioeconomic History    Marital status:    Tobacco Use    Smoking status: Never    Smokeless tobacco:  "Never   Substance and Sexual Activity    Alcohol use: Never    Drug use: Never    Sexual activity: Yes     Partners: Female     Social Drivers of Health     Financial Resource Strain: Low Risk  (1/13/2025)    Overall Financial Resource Strain (CARDIA)     Difficulty of Paying Living Expenses: Not very hard   Food Insecurity: No Food Insecurity (1/13/2025)    Hunger Vital Sign     Worried About Running Out of Food in the Last Year: Never true     Ran Out of Food in the Last Year: Never true   Transportation Needs: No Transportation Needs (1/13/2025)    PRAPARE - Transportation     Lack of Transportation (Medical): No     Lack of Transportation (Non-Medical): No   Physical Activity: Sufficiently Active (1/13/2025)    Exercise Vital Sign     Days of Exercise per Week: 3 days     Minutes of Exercise per Session: 60 min   Stress: No Stress Concern Present (1/13/2025)    [employer] [place] - Occupational Stress Questionnaire     Feeling of Stress : Only a little   Housing Stability: Unknown (1/13/2025)    Housing Stability Vital Sign     Unable to Pay for Housing in the Last Year: No     Homeless in the Last Year: No       Current Medications[1]    Review of patient's allergies indicates:  No Known Allergies      Objective:       Last 3 sets of Vitals        12/23/2024    12:56 PM 1/13/2025     1:27 PM 3/19/2025     1:01 PM   Vitals - 1 value per visit   SYSTOLIC 134 142 148   DIASTOLIC 82 84 86   Pulse 83 87 97   Temp  98.2 °F (36.8 °C)    SPO2 97 % 98 % 98 %   Weight (lb) 169.86 170.42 164.35   Weight (kg) 77.05 77.3 74.55   Height 5' 10" (1.778 m) 5' 10" (1.778 m) 5' 10" (1.778 m)   BMI (Calculated) 24.4 24.5 23.6   Pain Score Zero Zero Zero   Physical Exam  Constitutional:       General: He is not in acute distress.     Appearance: Normal appearance.   HENT:      Right Ear: External ear normal.      Left Ear: External ear normal.   Eyes:      General: No scleral icterus.     Extraocular " Movements: Extraocular movements intact.      Conjunctiva/sclera: Conjunctivae normal.   Neck:      Vascular: No carotid bruit.      Comments: No goiter.  Cardiovascular:      Rate and Rhythm: Normal rate and regular rhythm.      Pulses: Normal pulses.      Heart sounds: Normal heart sounds.   Pulmonary:      Effort: Pulmonary effort is normal.      Breath sounds: Normal breath sounds.   Abdominal:      General: Bowel sounds are normal. There is no distension.      Palpations: Abdomen is soft. There is no mass.      Tenderness: There is no abdominal tenderness.   Musculoskeletal:         General: No swelling. Normal range of motion.   Lymphadenopathy:      Cervical: No cervical adenopathy.   Skin:     General: Skin is warm and dry.   Neurological:      General: No focal deficit present.      Mental Status: He is alert and oriented to person, place, and time.      Motor: No weakness.      Comments: No loss of balance or obvious tremor. Frequent fast speech with.    Psychiatric:         Mood and Affect: Mood normal.         Behavior: Behavior normal.                 CBC:  Recent Labs   Lab 02/17/23  0825 03/15/24  0838 12/19/24  0717   WBC 4.84 4.80 9.69   RBC 4.79 4.96 5.18   Hemoglobin 15.0 15.6 16.1   Hematocrit 44.0 46.0 47.9   Platelets 189 194 214   MCV 92 93 93   MCH 31.3 H 31.5 H 31.1 H   MCHC 34.1 33.9 33.6     CMP:  Recent Labs   Lab 03/15/24  0838 12/19/24  0717 03/14/25  0839   Glucose 109 135 H 121 H   Calcium 9.5 9.8 9.8   Albumin 4.3 4.4 4.4   Total Protein 6.6 7.1 7.4   Sodium 140 138 141   Potassium 4.7 4.1 4.5   CO2 27 27 29   Chloride 105 101 106   BUN 13 19 15   Creatinine 0.9 1.0 1.0   Alkaline Phosphatase 71 63 65   ALT <5 L 11 <5 L   AST 13 14 18   Total Bilirubin 1.2 H 1.5 H 1.2 H     URINALYSIS:       LIPIDS:  Recent Labs   Lab 08/24/22  0834 02/17/23  0825 02/17/23  0826 03/15/24  0838 12/19/24  0717 03/14/25  0839   TSH 2.40 1.215  --  1.254  --   --    HDL 51  --    < > 54 49 59    Cholesterol 159  --    < > 146 142 144   Triglycerides 69  --    < > 66 63 66   LDL Cholesterol 92  --    < > 78.8 80.4 71.8   HDL/Cholesterol Ratio 3.1  --    < > 37.0 34.5 41.0   Non-HDL Cholesterol  --   --    < > 92 93 85   Non HDL Chol. (LDL+VLDL) 108  --   --   --   --   --    Total Cholesterol/HDL Ratio  --   --    < > 2.7 2.9 2.4    < > = values in this interval not displayed.     TSH:  Recent Labs   Lab 22  0834 23  0825 03/15/24  0838   TSH 2.40 1.215 1.254       A1C:  Recent Labs   Lab 22  0834 23  0825 03/15/24  0838 24  0717 25  0839   Hemoglobin A1C 5.4 5.4 5.5 5.7 H 5.8 H       Imaging:  Fl Modified Barium Swallow Speech  Narrative: EXAMINATION:  FL MODIFIED BARIUM SWALLOW SPEECH STUDY    CLINICAL HISTORY:  Parkinson's disease with dyskinesia, without mention of fluctuations    TECHNIQUE:  Fluoroscopy modified barium swallow speech study was performed in conjunction with the speech pathology department.    FINDINGS:  There is no laryngeal penetration or tracheal aspiration.  Please refer to speech pathology note further detail.  Impression: There is no laryngeal penetration or tracheal aspiration.    Electronically signed by: Kvng Silva MD  Date:    2025  Time:    11:27    Orthostatic vital signs:  Supine: 153/87, 69  Sittin/ 91, 73  Standing, 149/90, 83    Assessment:       1. Essential hypertension    2. Mild mood disorder    3. Parkinsonism, unspecified Parkinsonism type    4. Falls frequently    5. Oropharyngeal dysphagia    6. Prediabetes    7. Healthcare maintenance            Plan:       1. Essential hypertension  Overview:  On irbesartan 150 mg twice a day and hydrochlorothiazide. Adding Amlodipine 2.5 mg daily.    Assessment & Plan:  - Started Amlodipine 2.5 mg daily for blood pressure management, with potential for dose increase if needed.  - Continued current medications, including HCTZ and irbesartan, while monitoring response to new  regimen.  - Check blood pressure at home using digital medication program (link to be sent via patient portal).  - Have blood pressure checked during physical therapy or speech therapy appointments if possible.    Orders:  -     irbesartan (AVAPRO) 150 MG tablet; Take 1 tablet (150 mg total) by mouth 2 (two) times a day.  Dispense: 180 tablet; Refill: 3  -     Cancel: Comprehensive Metabolic Panel; Future; Expected date: 03/19/2025  -     amLODIPine (NORVASC) 2.5 MG tablet; Take 1 tablet (2.5 mg total) by mouth once daily.  Dispense: 30 tablet; Refill: 6  -     Hypertension Digital Medicine (HDMP) Enrollment Order    2. Mild mood disorder  Assessment & Plan:  - Evaluated reported sleep issues and anxiety symptoms, administering EDUIN-7 and PHQ-9 screening tools.   - Determined mild anxiety (EDUIN-7 score: 7.5) and mild depression (PHQ-9 score: 8).   - Observe, consider LCSW eval for therapy. Associates mood changes to other family stressors that are taking place and hoping to be better managed with time.   - Claude can try OTC melatonin up to 10 mg for sleep if desired. Consider Trazodone.  - Continued magnesium supplementation for restless leg symptoms.      3. Parkinsonism, unspecified Parkinsonism type  Overview:  Carbidopa levodopa  mg 2 tablets 3 times a day.  Selegiline 5 mg twice a day.    Assessment & Plan:  - Continued carbidopa levodopa 25/250 mg 6 times daily.  - Assessed frequent falls and rapid speech, potentially related to Parkinson's disease.   - Speech therapy evaluation with no aspiration or penetration.  - Weight loss and swallowing difficulties require dietary modifications and supplementation (Ensure or Protein shake) if needed.  - Follow up with neurology.            4. Falls frequently  Overview:  Related to loss of balance and likely Parkinson's condition.  Physical therapy ordered.    Assessment & Plan:  - No orthostatism noted.  - Claude to continue using cane consistently to prevent  falls.  - Continue physical therapy      5. Oropharyngeal dysphagia  Assessment & Plan:  - Explained proper eating techniques to prevent choking: taking small bites, chewing thoroughly, and drinking water between bites.  - Claude to implement these techniques.  - Claude can consider using protein shakes or supplements like Ensure or Boost to maintain nutrition if not finishing meals.  - Discussed the importance of staying hydrated, especially with blood pressure medications.  - Explained the potential for dry mouth as a side effect of medications.  - Recommend increasing fluid intake, particularly water.        6. Prediabetes  Overview:  - 12/23/24 Assessed pre-diabetes status based on recent glucose level of 135 and A1C of 5.7    Assessment & Plan:  - Evaluated recent lab results showing improved bilirubin and cholesterol levels, but slightly elevated fasting glucose and A1c in pre-diabetic range.  - Has decreased frequency of sugary drinks and cereal. Encourage healthier options and consider Protein supplements.    Orders:  -     Cancel: Comprehensive Metabolic Panel; Future; Expected date: 03/19/2025  -     Cancel: Hemoglobin A1C; Future; Expected date: 03/19/2025    7. Healthcare maintenance  Assessment & Plan:  - Yearly labs- done on 03/14/25  - Colon cancer screening- fit kit 3/26/24, negative.  - PSA- 12/19/24, 0.85.  - ACP- Has HPOA in chart.  - Vaccination- due for shingles, RSV, flu, COVID booster, and pneumonia.         FOLLOW-UP CARE:  - Follow up in 1 month.  - Contact office if any issues arise before next appointment.        Health Maintenance Due   Topic Date Due    Hepatitis C Screening  Never done    Shingles Vaccine (1 of 2) Never done    Pneumococcal Vaccines (Age 50+) (1 of 1 - PCV) Never done    RSV Vaccine (Age 60+ and Pregnant patients) (1 - Risk 60-74 years 1-dose series) Never done        I spent a total of 50 minutes on the day of the visit.This includes face to face time and non-face to  face time preparing to see the patient (eg, review of tests), obtaining and/or reviewing separately obtained history, documenting clinical information in the electronic or other health record, independently interpreting results and communicating results to the patient/family/caregiver, or care coordinator.     RETURN TO CLINIC IN: 1 MONTH    FOR NEXT VISIT: BLOOD PRESSURE MONITORING, MEDICATION MONITORING, and CARE GAPS       Evangelina Stone MD  [facility]  Disclaimer:  This note has been generated using voice-recognition software. There may be grammatical or spelling errors that have been missed during proof-reading           [1]   Current Outpatient Medications   Medication Sig Dispense Refill    carbidopa-levodopa  mg (SINEMET)  mg per tablet TAKE 2 TABLETS BY MOUTH 3 TIMES A  tablet 3    hydroCHLOROthiazide (HYDRODIURIL) 12.5 MG Tab Take 1 tablet (12.5 mg total) by mouth once daily. 30 tablet 11    selegiline HCl (ELDEPRYL) 5 mg tablet TAKE 1 TABLET BY MOUTH 2 TIMES DAILY WITH MEALS. 180 tablet 3    sildenafiL (VIAGRA) 100 MG tablet Take 1 tablet (100 mg total) by mouth daily as needed for Erectile Dysfunction. 30 tablet 11    amLODIPine (NORVASC) 2.5 MG tablet Take 1 tablet (2.5 mg total) by mouth once daily. 30 tablet 6    irbesartan (AVAPRO) 150 MG tablet Take 1 tablet (150 mg total) by mouth 2 (two) times a day. 180 tablet 3    ondansetron (ZOFRAN-ODT) 4 MG TbDL Take 1 tablet (4 mg total) by mouth every 8 (eight) hours as needed (nausea). (Patient not taking: Reported on 3/19/2025) 15 tablet 1    vitamin D (VITAMIN D3) 1000 units Tab Take 1 tablet (1,000 Units total) by mouth once daily. (Patient not taking: Reported on 3/19/2025) 30 tablet 11     No current facility-administered medications for this visit.

## 2025-03-20 NOTE — ASSESSMENT & PLAN NOTE
- Evaluated recent lab results showing improved bilirubin and cholesterol levels, but slightly elevated fasting glucose and A1c in pre-diabetic range.  - Has decreased frequency of sugary drinks and cereal. Encourage healthier options and consider Protein supplements.

## 2025-03-20 NOTE — ASSESSMENT & PLAN NOTE
- No orthostatism noted.  - Claude to continue using cane consistently to prevent falls.  - Continue physical therapy

## 2025-03-20 NOTE — ASSESSMENT & PLAN NOTE
- Continued carbidopa levodopa 25/250 mg 6 times daily.  - Assessed frequent falls and rapid speech, potentially related to Parkinson's disease.   - Speech therapy evaluation with no aspiration or penetration.  - Weight loss and swallowing difficulties require dietary modifications and supplementation (Ensure or Protein shake) if needed.  - Follow up with neurology.

## 2025-03-20 NOTE — ASSESSMENT & PLAN NOTE
- Explained proper eating techniques to prevent choking: taking small bites, chewing thoroughly, and drinking water between bites.  - Claude to implement these techniques.  - Claude can consider using protein shakes or supplements like Ensure or Boost to maintain nutrition if not finishing meals.  - Discussed the importance of staying hydrated, especially with blood pressure medications.  - Explained the potential for dry mouth as a side effect of medications.  - Recommend increasing fluid intake, particularly water.

## 2025-03-20 NOTE — ASSESSMENT & PLAN NOTE
- Started Amlodipine 2.5 mg daily for blood pressure management, with potential for dose increase if needed.  - Continued current medications, including HCTZ and irbesartan, while monitoring response to new regimen.  - Check blood pressure at home using digital medication program (link to be sent via patient portal).  - Have blood pressure checked during physical therapy or speech therapy appointments if possible.

## 2025-03-20 NOTE — ASSESSMENT & PLAN NOTE
- Evaluated reported sleep issues and anxiety symptoms, administering EDUIN-7 and PHQ-9 screening tools.   - Determined mild anxiety (EDUIN-7 score: 7.5) and mild depression (PHQ-9 score: 8).   - Observe, consider LCSW eval for therapy. Associates mood changes to other family stressors that are taking place and hoping to be better managed with time.   - Claude can try OTC melatonin up to 10 mg for sleep if desired. Consider Trazodone.  - Continued magnesium supplementation for restless leg symptoms.

## 2025-03-20 NOTE — ASSESSMENT & PLAN NOTE
- Yearly labs- done on 03/14/25  - Colon cancer screening- fit kit 3/26/24, negative.  - PSA- 12/19/24, 0.85.  - ACP- Has HPOA in chart.  - Vaccination- due for shingles, RSV, flu, COVID booster, and pneumonia.

## 2025-04-22 RX ORDER — HYDROCHLOROTHIAZIDE 12.5 MG/1
12.5 TABLET ORAL
Qty: 30 TABLET | Refills: 0 | Status: SHIPPED | OUTPATIENT
Start: 2025-04-22

## 2025-04-24 ENCOUNTER — OFFICE VISIT (OUTPATIENT)
Dept: PRIMARY CARE CLINIC | Facility: CLINIC | Age: 65
End: 2025-04-24
Payer: MEDICARE

## 2025-04-24 VITALS
WEIGHT: 164.44 LBS | SYSTOLIC BLOOD PRESSURE: 144 MMHG | HEART RATE: 70 BPM | DIASTOLIC BLOOD PRESSURE: 90 MMHG | HEIGHT: 70 IN | BODY MASS INDEX: 23.54 KG/M2 | OXYGEN SATURATION: 100 %

## 2025-04-24 DIAGNOSIS — I10 ESSENTIAL HYPERTENSION: Primary | ICD-10-CM

## 2025-04-24 DIAGNOSIS — M79.641 PAIN IN BOTH HANDS: ICD-10-CM

## 2025-04-24 DIAGNOSIS — Z00.00 HEALTHCARE MAINTENANCE: ICD-10-CM

## 2025-04-24 DIAGNOSIS — R73.03 PREDIABETES: ICD-10-CM

## 2025-04-24 DIAGNOSIS — Z78.9 IMPAIRED MOBILITY AND ADLS: ICD-10-CM

## 2025-04-24 DIAGNOSIS — M79.642 PAIN IN BOTH HANDS: ICD-10-CM

## 2025-04-24 DIAGNOSIS — Z74.09 IMPAIRED MOBILITY AND ADLS: ICD-10-CM

## 2025-04-24 DIAGNOSIS — G20.C PARKINSONISM, UNSPECIFIED PARKINSONISM TYPE: ICD-10-CM

## 2025-04-24 PROCEDURE — 99999 PR PBB SHADOW E&M-EST. PATIENT-LVL IV: CPT | Mod: PBBFAC,,, | Performed by: INTERNAL MEDICINE

## 2025-04-24 PROCEDURE — 99214 OFFICE O/P EST MOD 30 MIN: CPT | Mod: S$PBB,,, | Performed by: INTERNAL MEDICINE

## 2025-04-24 PROCEDURE — 99214 OFFICE O/P EST MOD 30 MIN: CPT | Mod: PBBFAC,PN | Performed by: INTERNAL MEDICINE

## 2025-04-24 RX ORDER — AMLODIPINE BESYLATE 5 MG/1
5 TABLET ORAL DAILY
Qty: 30 TABLET | Refills: 6 | Status: SHIPPED | OUTPATIENT
Start: 2025-04-24 | End: 2026-04-24

## 2025-04-24 NOTE — PROGRESS NOTES
Subjective:       Patient ID: Claude E. Escude is a 65 y.o. male.    Chief Complaint: Hypertension      HPI  Claude E. Escude is a 65 y.o. male with  hypertension, Parkinson's disease who presents today for Hypertension    Claude presents today for follow-up of blood pressure management.    He is currently taking amlodipine, Irbesartan, and hydrochlorothiazide for blood pressure management. He denies feet swelling with current amlodipine dose, and denies any headaches or dizziness from the medications. Tolerating medication but BP mildly elevated.    Reports compliance with Parkinson's medication. Continues to receive physical therapy. He reports difficulty with fine motor tasks, including inability to tie shoes and problems with buttons. He also notes ineffective tool use, such as hammering nails. He experiences pain in both hands. He has had multiple falls, predominantly in the kitchen while turning during tasks, due to inconsistent use of his cane. Occupational therapy was recommended. He denies falls due to dizziness, knee buckling, or tripping.    He reports irregular sleep patterns, ranging from no sleep for up to 36 hours to sleeping for 8 hours. He previously used melatonin supplements but discontinued use after effectiveness diminished over time. Usually feels rested when he wakes up.    He reports weight loss of 11 lbs over the past year with current weight of 164 lbs (stable from previous visit).    ROS:  General: -fever, -chills, -fatigue, -weight gain, +weight loss  Eyes: -vision changes, -redness, -discharge  ENT: -ear pain, -nasal congestion, -sore throat  Cardiovascular: -chest pain, -palpitations, -lower extremity edema  Respiratory: -cough, -shortness of breath  Gastrointestinal: -abdominal pain, -nausea, -vomiting, -diarrhea, -constipation, -blood in stool  Genitourinary: -dysuria, -hematuria, -frequency  Musculoskeletal: +joint pain, -muscle pain  Skin: -rash, -lesion  Neurological: -headache,  +dizziness, -numbness, -tingling, +falling, +decreased need for sleep  Psychiatric: -anxiety, -depression, -sleep difficulty       Advance Care Planning     Date: 04/26/2025    Living Will  During this visit, I engaged the patient  in the voluntary advance care planning process.  The patient and I reviewed the role for advance directives and their purpose in directing future healthcare if the patient's unable to speak for him/herself.  At this point in time, the patient does have full decision-making capacity.  We discussed different extreme health states that he could experience, and reviewed what kind of medical care he would want in those situations.  The patient communicated that if he were comatose and had little chance of a meaningful recovery, he would not want machines/life-sustaining treatments used. The patient has completed a living will to reflect these preferences and The patient has already designated a healthcare power of  to make decisions on his behalf.  I spent a total of 5 minutes engaging the patient in this advance care planning discussion.    Advance Care Planning     Date: 04/26/2025    ACP Reviewed/No Changes  Voluntary advance care planning discussion had today with patient. Previously completed HCPOA in electronic medical record is current, no changes made.      A total of 5 min was spent on advance care planning, goals of care discussion, emotional support, formulating and communicating prognosis and exploring burden/benefit of various approaches of treatment. This discussion occurred on a fully voluntary basis with the verbal consent of the patient and/or family.                 Health Maintenance:  Health Maintenance   Topic Date Due    Hepatitis C Screening  Never done    Shingles Vaccine (1 of 2) Never done    Pneumococcal Vaccines (Age 50+) (1 of 1 - PCV) Never done    RSV Vaccine (Age 60+ and Pregnant patients) (1 - Risk 60-74 years 1-dose series) Never done    Influenza  Vaccine (1) 06/30/2025 (Originally 9/1/2024)    COVID-19 Vaccine (3 - 2024-25 season) 01/13/2026 (Originally 9/1/2024)    PROSTATE-SPECIFIC ANTIGEN  12/19/2025    Hemoglobin A1c (Prediabetes)  03/14/2026    Colorectal Cancer Screening  12/06/2027    Lipid Panel  03/14/2030    TETANUS VACCINE  08/17/2032         Past Medical History:   Diagnosis Date    HTN (hypertension)     Parkinson's disease        Past Surgical History:   Procedure Laterality Date    JOINT REPLACEMENT         Family History   Problem Relation Name Age of Onset    Heart disease Mother      Hypertension Mother      Diabetes Mellitus Mother      Arthritis Mother      Prostate cancer Father  48    No Known Problems Sister      Bone cancer Brother  43    Heart attack Brother  46        smoked    Heart disease Maternal Grandmother      Heart disease Maternal Grandfather      Heart disease Paternal Grandmother      Heart disease Paternal Grandfather         Social History     Socioeconomic History    Marital status:    Tobacco Use    Smoking status: Never    Smokeless tobacco: Never   Substance and Sexual Activity    Alcohol use: Never    Drug use: Never    Sexual activity: Yes     Partners: Female     Social Drivers of Health     Financial Resource Strain: Low Risk  (1/13/2025)    Overall Financial Resource Strain (CARDIA)     Difficulty of Paying Living Expenses: Not very hard   Food Insecurity: No Food Insecurity (1/13/2025)    Hunger Vital Sign     Worried About Running Out of Food in the Last Year: Never true     Ran Out of Food in the Last Year: Never true   Transportation Needs: No Transportation Needs (1/13/2025)    PRAPARE - Transportation     Lack of Transportation (Medical): No     Lack of Transportation (Non-Medical): No   Physical Activity: Sufficiently Active (1/13/2025)    Exercise Vital Sign     Days of Exercise per Week: 3 days     Minutes of Exercise per Session: 60 min   Stress: No Stress Concern Present (1/13/2025)    [employer]  "[employer] - Occupational Stress Questionnaire     Feeling of Stress : Only a little   Housing Stability: Unknown (1/13/2025)    Housing Stability Vital Sign     Unable to Pay for Housing in the Last Year: No     Homeless in the Last Year: No       Current Medications[1]    Review of patient's allergies indicates:  No Known Allergies      Objective:       Last 3 sets of Vitals        1/13/2025     1:27 PM 3/19/2025     1:01 PM 4/24/2025     2:30 PM   Vitals - 1 value per visit   SYSTOLIC 142 148 144   DIASTOLIC 84 86 90   Pulse 87 97 70   Temp 98.2 °F (36.8 °C)     SPO2 98 % 98 % 100 %   Weight (lb) 170.42 164.35 164.46   Weight (kg) 77.3 74.55 74.6   Height 5' 10" (1.778 m) 5' 10" (1.778 m) 5' 10" (1.778 m)   BMI (Calculated) 24.5 23.6 23.6   Pain Score Zero Zero Zero   Physical Exam  Constitutional:       General: He is not in acute distress.     Appearance: Normal appearance.   HENT:      Right Ear: External ear normal.      Left Ear: External ear normal.   Eyes:      General: No scleral icterus.     Extraocular Movements: Extraocular movements intact.      Conjunctiva/sclera: Conjunctivae normal.   Neck:      Vascular: No carotid bruit.      Comments: No goiter.  Cardiovascular:      Rate and Rhythm: Normal rate and regular rhythm.      Pulses: Normal pulses.      Heart sounds: Normal heart sounds.   Pulmonary:      Effort: Pulmonary effort is normal.      Breath sounds: Normal breath sounds.   Abdominal:      General: Bowel sounds are normal. There is no distension.      Palpations: Abdomen is soft. There is no mass.      Tenderness: There is no abdominal tenderness.   Musculoskeletal:         General: No swelling. Normal range of motion.   Lymphadenopathy:      Cervical: No cervical adenopathy.   Skin:     General: Skin is warm and dry.   Neurological:      General: No focal deficit present.      Mental Status: He is alert and oriented to person, place, and time.      Motor: No weakness. "      Comments: Demonstrates how he tries to tie his shoe lace several times until able to complete it. No loss of balance when standing or obvious tremor. Frequent fast speech with with occasional low volume.    Psychiatric:         Mood and Affect: Mood normal.         Behavior: Behavior normal.           CBC:  Recent Labs   Lab 02/17/23 0825 03/15/24  0838 12/19/24 0717   WBC 4.84 4.80 9.69   RBC 4.79 4.96 5.18   Hemoglobin 15.0 15.6 16.1   Hematocrit 44.0 46.0 47.9   Platelets 189 194 214   MCV 92 93 93   MCH 31.3 H 31.5 H 31.1 H   MCHC 34.1 33.9 33.6     CMP:  Recent Labs   Lab 03/15/24  0838 12/19/24  0717 03/14/25  0839   Glucose 109 135 H 121 H   Calcium 9.5 9.8 9.8   Albumin 4.3 4.4 4.4   Total Protein 6.6 7.1 7.4   Sodium 140 138 141   Potassium 4.7 4.1 4.5   CO2 27 27 29   Chloride 105 101 106   BUN 13 19 15   Creatinine 0.9 1.0 1.0   Alkaline Phosphatase 71 63 65   ALT <5 L 11 <5 L   AST 13 14 18   Total Bilirubin 1.2 H 1.5 H 1.2 H     URINALYSIS:       LIPIDS:  Recent Labs   Lab 08/24/22  0834 02/17/23  0825 02/17/23  0826 03/15/24  0838 12/19/24  0717 03/14/25  0839   TSH 2.40 1.215  --  1.254  --   --    HDL 51  --    < > 54 49 59   Cholesterol 159  --    < > 146 142 144   Triglycerides 69  --    < > 66 63 66   LDL Cholesterol 92  --    < > 78.8 80.4 71.8   HDL/Cholesterol Ratio 3.1  --    < > 37.0 34.5 41.0   Non-HDL Cholesterol  --   --    < > 92 93 85   Non HDL Chol. (LDL+VLDL) 108  --   --   --   --   --    Total Cholesterol/HDL Ratio  --   --    < > 2.7 2.9 2.4    < > = values in this interval not displayed.     TSH:  Recent Labs   Lab 08/24/22  0834 02/17/23  0825 03/15/24  0838   TSH 2.40 1.215 1.254       A1C:  Recent Labs   Lab 08/24/22  0834 02/17/23  0825 03/15/24  0838 12/19/24  0717 03/14/25  0839   Hemoglobin A1C 5.4 5.4 5.5 5.7 H 5.8 H       Imaging:  Fl Modified Barium Swallow Speech  Narrative: EXAMINATION:  FL MODIFIED BARIUM SWALLOW SPEECH STUDY    CLINICAL HISTORY:  Parkinson's  disease with dyskinesia, without mention of fluctuations    TECHNIQUE:  Fluoroscopy modified barium swallow speech study was performed in conjunction with the speech pathology department.    FINDINGS:  There is no laryngeal penetration or tracheal aspiration.  Please refer to speech pathology note further detail.  Impression: There is no laryngeal penetration or tracheal aspiration.    Electronically signed by: Kvng Silva MD  Date:    03/17/2025  Time:    11:27      Assessment:       1. Essential hypertension    2. Parkinsonism, unspecified Parkinsonism type    3. Prediabetes    4. Pain in both hands    5. Impaired mobility and ADLs    6. Healthcare maintenance            Plan:       1. Essential hypertension  Overview:  On irbesartan 150 mg twice a day and hydrochlorothiazide. Adding Amlodipine 2.5 mg daily.    Assessment & Plan:  - Increased amlodipine dose from 2.5 mg to 5 mg daily to better control blood pressure.  - Continued hydrochlorothiazide at current dose with potential to discontinue if blood pressure is well-controlled with increased amlodipine dose.  - May increase amlodipine to 10 mg if 5 mg does not provide adequate control.  - Added diagnosis to ensure prior authorization for antihypertensive medication.  - Instructed the patient to monitor for feet swelling.    Orders:  -     amLODIPine (NORVASC) 5 MG tablet; Take 1 tablet (5 mg total) by mouth once daily.  Dispense: 30 tablet; Refill: 6  -     Ambulatory referral/consult to Occupational Medicine; Future; Expected date: 04/24/2025    2. Parkinsonism, unspecified Parkinsonism type  Overview:  Carbidopa levodopa  mg 2 tablets 3 times a day.  Selegiline 5 mg twice a day.    Assessment & Plan:  - Continued carbidopa levodopa 25/250 mg 6 times daily.  - Weight loss and swallowing difficulties require dietary modifications and supplementation (Ensure or Protein shake) if needed. Plus possibly associated to increased physical efforts with balance  and coordination.  - Observed that the patient can walk short distances without a cane but has difficulty with turns and uneven surfaces.  - Noted patient's difficulty with tying shoes, buttoning clothes, and using tools.  - Falls occur due to not consistently using cane and during turns/transitions.  - Sleep patterns show significant variability in sleep duration without apparent negative effects on energy levels.  - Follow up with neurology.          Orders:  -     Ambulatory referral/consult to Occupational Medicine; Future; Expected date: 04/24/2025    3. Prediabetes  Overview:  - 12/23/24 Assessed pre-diabetes status based on recent glucose level of 135 and A1C of 5.7    Assessment & Plan:  - Has slightly A1c in pre-diabetic range. Fasting glucose is better.  - Has decreased frequency of sugary drinks and cereal. Encourage healthier options and consider Protein supplements.      4. Pain in both hands  Comments:  - Noted pain in both hands, possibly related to arthritis.  - Referred the patient for occupational therapy to address hand pain and function.  Overview:  - Noted pain in both hands, possibly related to arthritis.  - Referred the patient for occupational therapy to address hand pain and function.    Orders:  -     Ambulatory referral/consult to Occupational Medicine; Future; Expected date: 04/24/2025    5. Impaired mobility and ADLs  Assessment & Plan:  - Referred the patient to occupational therapy to address issues with handwriting, buttoning clothes, and other fine motor skills.  - Noted patient's difficulty with tying shoes, buttoning clothes, and using tools.  - Observed clumsiness and improper operation of hands.  - Noted patient's reports of falls due to inconsistent cane use and difficulty with turns and steps.  - Observed that the patient can walk short distances without a cane but has difficulty with turns and uneven surfaces.  - Continued physical therapy twice weekly to address walking  difficulties and falls.    Orders:  -     Ambulatory referral/consult to Occupational Medicine; Future; Expected date: 04/24/2025    6. Healthcare maintenance  Assessment & Plan:  - Yearly labs- done on 03/14/25  - Colon cancer screening- fit kit 3/26/24, negative.  - PSA- 12/19/24, 0.85.  - ACP-Completed LW and reviewed HCPOA on 4/24/25.  - Vaccination- due for shingles, RSV, flu, COVID booster, and pneumonia.        FOLLOW-UP:  - Scheduled follow up in 30 days to assess effectiveness of increased amlodipine dose.        Health Maintenance Due   Topic Date Due    Hepatitis C Screening  Never done    Shingles Vaccine (1 of 2) Never done    Pneumococcal Vaccines (Age 50+) (1 of 1 - PCV) Never done    RSV Vaccine (Age 60+ and Pregnant patients) (1 - Risk 60-74 years 1-dose series) Never done        I spent a total of 35 minutes on the day of the visit.This includes face to face time and non-face to face time preparing to see the patient (eg, review of tests), obtaining and/or reviewing separately obtained history, documenting clinical information in the electronic or other health record, independently interpreting results and communicating results to the patient/family/caregiver, or care coordinator.     RETURN TO CLINIC IN: 2-3 WEEKS for bp check    FOR NEXT VISIT: MEDICATION MONITORING     Evangelina Stone MD  [facility]  Disclaimer:  This note has been generated using voice-recognition software. There may be grammatical or spelling errors that have been missed during proof-reading           [1]   Current Outpatient Medications   Medication Sig Dispense Refill    carbidopa-levodopa  mg (SINEMET)  mg per tablet TAKE 2 TABLETS BY MOUTH 3 TIMES A  tablet 3    hydroCHLOROthiazide 12.5 MG Tab Take 1 tablet by mouth once daily 30 tablet 0    irbesartan (AVAPRO) 150 MG tablet Take 1 tablet (150 mg total) by mouth 2 (two) times a day. 180 tablet 3    selegiline HCl (ELDEPRYL) 5 mg tablet TAKE 1 TABLET  BY MOUTH 2 TIMES DAILY WITH MEALS. 180 tablet 3    sildenafiL (VIAGRA) 100 MG tablet Take 1 tablet (100 mg total) by mouth daily as needed for Erectile Dysfunction. 30 tablet 11    amLODIPine (NORVASC) 5 MG tablet Take 1 tablet (5 mg total) by mouth once daily. 30 tablet 6     No current facility-administered medications for this visit.

## 2025-04-26 PROBLEM — M79.641 PAIN IN BOTH HANDS: Status: ACTIVE | Noted: 2025-04-26

## 2025-04-26 PROBLEM — M79.642 PAIN IN BOTH HANDS: Status: ACTIVE | Noted: 2025-04-26

## 2025-04-26 PROBLEM — E78.49 OTHER HYPERLIPIDEMIA: Status: RESOLVED | Noted: 2024-12-24 | Resolved: 2025-04-26

## 2025-04-26 NOTE — ASSESSMENT & PLAN NOTE
- Has slightly A1c in pre-diabetic range. Fasting glucose is better.  - Has decreased frequency of sugary drinks and cereal. Encourage healthier options and consider Protein supplements.

## 2025-04-26 NOTE — ASSESSMENT & PLAN NOTE
- Referred the patient to occupational therapy to address issues with handwriting, buttoning clothes, and other fine motor skills.  - Noted patient's difficulty with tying shoes, buttoning clothes, and using tools.  - Observed clumsiness and improper operation of hands.  - Noted patient's reports of falls due to inconsistent cane use and difficulty with turns and steps.  - Observed that the patient can walk short distances without a cane but has difficulty with turns and uneven surfaces.  - Continued physical therapy twice weekly to address walking difficulties and falls.

## 2025-04-26 NOTE — ASSESSMENT & PLAN NOTE
- Continued carbidopa levodopa 25/250 mg 6 times daily.  - Weight loss and swallowing difficulties require dietary modifications and supplementation (Ensure or Protein shake) if needed. Plus possibly associated to increased physical efforts with balance and coordination.  - Observed that the patient can walk short distances without a cane but has difficulty with turns and uneven surfaces.  - Noted patient's difficulty with tying shoes, buttoning clothes, and using tools.  - Falls occur due to not consistently using cane and during turns/transitions.  - Sleep patterns show significant variability in sleep duration without apparent negative effects on energy levels.  - Follow up with neurology.

## 2025-04-26 NOTE — ASSESSMENT & PLAN NOTE
- Yearly labs- done on 03/14/25  - Colon cancer screening- fit kit 3/26/24, negative.  - PSA- 12/19/24, 0.85.  - ACP-Completed LW and reviewed HCPOA on 4/24/25.  - Vaccination- due for shingles, RSV, flu, COVID booster, and pneumonia.

## 2025-04-26 NOTE — ASSESSMENT & PLAN NOTE
- Increased amlodipine dose from 2.5 mg to 5 mg daily to better control blood pressure.  - Continued hydrochlorothiazide at current dose with potential to discontinue if blood pressure is well-controlled with increased amlodipine dose.  - May increase amlodipine to 10 mg if 5 mg does not provide adequate control.  - Added diagnosis to ensure prior authorization for antihypertensive medication.  - Instructed the patient to monitor for feet swelling.

## 2025-04-28 ENCOUNTER — TELEPHONE (OUTPATIENT)
Dept: FAMILY MEDICINE | Facility: CLINIC | Age: 65
End: 2025-04-28

## 2025-04-29 ENCOUNTER — TELEPHONE (OUTPATIENT)
Dept: PRIMARY CARE CLINIC | Facility: CLINIC | Age: 65
End: 2025-04-29
Payer: MEDICARE

## 2025-04-29 DIAGNOSIS — M79.641 PAIN IN BOTH HANDS: ICD-10-CM

## 2025-04-29 DIAGNOSIS — Z78.9 IMPAIRED MOBILITY AND ADLS: ICD-10-CM

## 2025-04-29 DIAGNOSIS — G20.C PARKINSONISM, UNSPECIFIED PARKINSONISM TYPE: Primary | ICD-10-CM

## 2025-04-29 DIAGNOSIS — M79.642 PAIN IN BOTH HANDS: ICD-10-CM

## 2025-04-29 DIAGNOSIS — Z74.09 IMPAIRED MOBILITY AND ADLS: ICD-10-CM

## 2025-04-30 ENCOUNTER — TELEPHONE (OUTPATIENT)
Dept: FAMILY MEDICINE | Facility: CLINIC | Age: 65
End: 2025-04-30
Payer: MEDICARE

## 2025-05-08 PROBLEM — Z78.9 IMPAIRED MOBILITY AND ACTIVITIES OF DAILY LIVING: Status: ACTIVE | Noted: 2025-05-08

## 2025-05-08 PROBLEM — Z74.09 IMPAIRED MOBILITY AND ACTIVITIES OF DAILY LIVING: Status: ACTIVE | Noted: 2025-05-08

## 2025-05-12 PROBLEM — Z74.09 IMPAIRED MOBILITY AND ACTIVITIES OF DAILY LIVING: Chronic | Status: ACTIVE | Noted: 2025-05-08

## 2025-05-12 PROBLEM — Z78.9 IMPAIRED MOBILITY AND ACTIVITIES OF DAILY LIVING: Chronic | Status: ACTIVE | Noted: 2025-05-08

## 2025-05-16 ENCOUNTER — PATIENT MESSAGE (OUTPATIENT)
Facility: CLINIC | Age: 65
End: 2025-05-16
Payer: MEDICARE

## 2025-05-24 RX ORDER — HYDROCHLOROTHIAZIDE 12.5 MG/1
12.5 TABLET ORAL
Qty: 30 TABLET | Refills: 0 | Status: SHIPPED | OUTPATIENT
Start: 2025-05-24

## 2025-05-28 ENCOUNTER — PATIENT MESSAGE (OUTPATIENT)
Facility: CLINIC | Age: 65
End: 2025-05-28
Payer: MEDICARE

## 2025-05-28 ENCOUNTER — PATIENT MESSAGE (OUTPATIENT)
Dept: PRIMARY CARE CLINIC | Facility: CLINIC | Age: 65
End: 2025-05-28
Payer: MEDICARE

## 2025-06-19 RX ORDER — HYDROCHLOROTHIAZIDE 12.5 MG/1
12.5 TABLET ORAL
Qty: 30 TABLET | Refills: 0 | Status: SHIPPED | OUTPATIENT
Start: 2025-06-19
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
## 2025-07-08 ENCOUNTER — OFFICE VISIT (OUTPATIENT)
Facility: CLINIC | Age: 65
End: 2025-07-08
Payer: MEDICARE

## 2025-07-08 VITALS
DIASTOLIC BLOOD PRESSURE: 80 MMHG | WEIGHT: 162 LBS | HEIGHT: 70 IN | SYSTOLIC BLOOD PRESSURE: 139 MMHG | BODY MASS INDEX: 23.19 KG/M2 | HEART RATE: 91 BPM

## 2025-07-08 DIAGNOSIS — R26.89 PRIMARY FREEZING OF GAIT: ICD-10-CM

## 2025-07-08 DIAGNOSIS — R47.89 RAPID RATE OF SPEECH: ICD-10-CM

## 2025-07-08 DIAGNOSIS — R47.1 DYSARTHRIA: ICD-10-CM

## 2025-07-08 DIAGNOSIS — Z71.89 COUNSELING REGARDING GOALS OF CARE: ICD-10-CM

## 2025-07-08 DIAGNOSIS — R29.6 FREQUENT FALLS: ICD-10-CM

## 2025-07-08 DIAGNOSIS — G20.B1 PARKINSON'S DISEASE WITH DYSKINESIA WITHOUT FLUCTUATING MANIFESTATIONS: Primary | ICD-10-CM

## 2025-07-08 DIAGNOSIS — R26.81 UNSTEADY GAIT: ICD-10-CM

## 2025-07-08 DIAGNOSIS — I10 ESSENTIAL HYPERTENSION: ICD-10-CM

## 2025-07-08 PROCEDURE — 99213 OFFICE O/P EST LOW 20 MIN: CPT | Mod: PBBFAC | Performed by: PHYSICIAN ASSISTANT

## 2025-07-08 PROCEDURE — 99999 PR PBB SHADOW E&M-EST. PATIENT-LVL III: CPT | Mod: PBBFAC,,, | Performed by: PHYSICIAN ASSISTANT

## 2025-07-08 PROCEDURE — 99214 OFFICE O/P EST MOD 30 MIN: CPT | Mod: S$PBB,,, | Performed by: PHYSICIAN ASSISTANT

## 2025-07-08 PROCEDURE — G2211 COMPLEX E/M VISIT ADD ON: HCPCS | Mod: ,,, | Performed by: PHYSICIAN ASSISTANT

## 2025-07-08 RX ORDER — ISTRADEFYLLINE 20 MG/1
20 TABLET, FILM COATED ORAL DAILY
Qty: 30 TABLET | Refills: 11 | Status: ACTIVE | OUTPATIENT
Start: 2025-07-08

## 2025-07-11 ENCOUNTER — PATIENT MESSAGE (OUTPATIENT)
Facility: CLINIC | Age: 65
End: 2025-07-11
Payer: MEDICARE

## 2025-07-14 NOTE — TELEPHONE ENCOUNTER
Placed outgoing call to patient regarding Nourianz vs Ritalin.  Attempted to leave VM . Not set up.   Placed outgoing call to pt other #.  Spoke to  Patient. He will try the Nourianz- [facility] sent in chaparro for financial assistance. He will see what happens with that and let us know.Forwarding to RR.

## 2025-07-15 ENCOUNTER — PATIENT MESSAGE (OUTPATIENT)
Dept: PRIMARY CARE CLINIC | Facility: CLINIC | Age: 65
End: 2025-07-15
Payer: MEDICARE

## 2025-07-23 RX ORDER — HYDROCHLOROTHIAZIDE 12.5 MG/1
12.5 TABLET ORAL
Qty: 30 TABLET | Refills: 0 | Status: SHIPPED | OUTPATIENT
Start: 2025-07-23

## 2025-07-29 ENCOUNTER — PATIENT MESSAGE (OUTPATIENT)
Facility: CLINIC | Age: 65
End: 2025-07-29
Payer: MEDICARE

## 2025-08-13 ENCOUNTER — PATIENT MESSAGE (OUTPATIENT)
Facility: CLINIC | Age: 65
End: 2025-08-13
Payer: MEDICARE

## 2025-08-20 DIAGNOSIS — I10 ESSENTIAL HYPERTENSION: Primary | ICD-10-CM

## 2025-08-20 RX ORDER — HYDROCHLOROTHIAZIDE 12.5 MG/1
12.5 TABLET ORAL
Qty: 30 TABLET | Refills: 0 | Status: SHIPPED | OUTPATIENT
Start: 2025-08-20

## 2025-08-22 ENCOUNTER — TELEPHONE (OUTPATIENT)
Dept: PRIMARY CARE CLINIC | Facility: CLINIC | Age: 65
End: 2025-08-22
Payer: MEDICARE

## 2025-08-22 ENCOUNTER — PATIENT MESSAGE (OUTPATIENT)
Dept: PRIMARY CARE CLINIC | Facility: CLINIC | Age: 65
End: 2025-08-22
Payer: MEDICARE